# Patient Record
Sex: MALE | Race: WHITE | NOT HISPANIC OR LATINO | Employment: FULL TIME | URBAN - METROPOLITAN AREA
[De-identification: names, ages, dates, MRNs, and addresses within clinical notes are randomized per-mention and may not be internally consistent; named-entity substitution may affect disease eponyms.]

---

## 2017-01-03 ENCOUNTER — TELEPHONE (OUTPATIENT)
Dept: MEDICAL GROUP | Facility: MEDICAL CENTER | Age: 62
End: 2017-01-03

## 2017-01-03 NOTE — TELEPHONE ENCOUNTER
----- Message from Marvin Barreto M.D. sent at 1/3/2017  7:15 AM PST -----  INR is in therapeutic range. Continue current dose of Coumadin. Recheck INR in 1 month.  Marvin Barreto MD

## 2017-01-05 ENCOUNTER — PATIENT MESSAGE (OUTPATIENT)
Dept: MEDICAL GROUP | Facility: MEDICAL CENTER | Age: 62
End: 2017-01-05

## 2017-01-05 DIAGNOSIS — I10 ESSENTIAL HYPERTENSION: ICD-10-CM

## 2017-03-06 RX ORDER — SPIRONOLACTONE 50 MG/1
TABLET, FILM COATED ORAL
Qty: 180 TAB | Refills: 3 | Status: SHIPPED | OUTPATIENT
Start: 2017-03-06 | End: 2018-06-15 | Stop reason: SDUPTHER

## 2017-03-06 RX ORDER — PRAVASTATIN SODIUM 80 MG/1
TABLET ORAL
Qty: 90 TAB | Refills: 1 | Status: SHIPPED | OUTPATIENT
Start: 2017-03-06 | End: 2017-08-27 | Stop reason: SDUPTHER

## 2017-03-15 ENCOUNTER — TELEPHONE (OUTPATIENT)
Dept: MEDICAL GROUP | Facility: MEDICAL CENTER | Age: 62
End: 2017-03-15

## 2017-03-15 NOTE — TELEPHONE ENCOUNTER
----- Message from Marvin Barreto M.D. sent at 3/15/2017  1:46 PM PDT -----  Please notify patient that his INR is slightly elevated at 3.3.  Please have him continue his current dose and recheck INR in 2 weeks.  Marvin Barreto M.D.

## 2017-04-19 ENCOUNTER — HOSPITAL ENCOUNTER (OUTPATIENT)
Dept: LAB | Facility: MEDICAL CENTER | Age: 62
End: 2017-04-19
Attending: FAMILY MEDICINE
Payer: COMMERCIAL

## 2017-04-19 LAB — GFR SERPL CREATININE-BSD FRML MDRD: >60 ML/MIN/1.73 M 2

## 2017-04-19 PROCEDURE — 80061 LIPID PANEL: CPT

## 2017-04-19 PROCEDURE — 36415 COLL VENOUS BLD VENIPUNCTURE: CPT

## 2017-04-19 PROCEDURE — 83036 HEMOGLOBIN GLYCOSYLATED A1C: CPT

## 2017-04-19 PROCEDURE — 80053 COMPREHEN METABOLIC PANEL: CPT

## 2017-04-20 ENCOUNTER — TELEPHONE (OUTPATIENT)
Dept: MEDICAL GROUP | Facility: MEDICAL CENTER | Age: 62
End: 2017-04-20

## 2017-04-20 LAB
ALBUMIN SERPL BCP-MCNC: 4 G/DL (ref 3.2–4.9)
ALBUMIN/GLOB SERPL: 1 G/DL
ALP SERPL-CCNC: 60 U/L (ref 30–99)
ALT SERPL-CCNC: 72 U/L (ref 2–50)
ANION GAP SERPL CALC-SCNC: 8 MMOL/L (ref 0–11.9)
AST SERPL-CCNC: 86 U/L (ref 12–45)
BILIRUB SERPL-MCNC: 0.6 MG/DL (ref 0.1–1.5)
BUN SERPL-MCNC: 15 MG/DL (ref 8–22)
CALCIUM SERPL-MCNC: 9.8 MG/DL (ref 8.5–10.5)
CHLORIDE SERPL-SCNC: 102 MMOL/L (ref 96–112)
CHOLEST SERPL-MCNC: 220 MG/DL (ref 100–199)
CO2 SERPL-SCNC: 24 MMOL/L (ref 20–33)
CREAT SERPL-MCNC: 1.16 MG/DL (ref 0.5–1.4)
EST. AVERAGE GLUCOSE BLD GHB EST-MCNC: 157 MG/DL
GLOBULIN SER CALC-MCNC: 4.2 G/DL (ref 1.9–3.5)
GLUCOSE SERPL-MCNC: 129 MG/DL (ref 65–99)
HBA1C MFR BLD: 7.1 % (ref 0–5.6)
HDLC SERPL-MCNC: 52 MG/DL
LDLC SERPL CALC-MCNC: 128 MG/DL
POTASSIUM SERPL-SCNC: 4.6 MMOL/L (ref 3.6–5.5)
PROT SERPL-MCNC: 8.2 G/DL (ref 6–8.2)
SODIUM SERPL-SCNC: 134 MMOL/L (ref 135–145)
TRIGL SERPL-MCNC: 198 MG/DL (ref 0–149)

## 2017-04-20 NOTE — TELEPHONE ENCOUNTER
----- Message from Marvin Barreto M.D. sent at 4/20/2017  8:07 AM PDT -----  Please have patient schedule appointment to discuss recent lab results. Not urgent or critical.  Marvin Barreto MD

## 2017-04-21 RX ORDER — WARFARIN SODIUM 5 MG/1
TABLET ORAL
Qty: 180 TAB | Refills: 1 | Status: SHIPPED | OUTPATIENT
Start: 2017-04-21 | End: 2017-09-26 | Stop reason: SDUPTHER

## 2017-04-28 ENCOUNTER — TELEPHONE (OUTPATIENT)
Dept: MEDICAL GROUP | Facility: MEDICAL CENTER | Age: 62
End: 2017-04-28

## 2017-04-28 NOTE — TELEPHONE ENCOUNTER
----- Message from Marvin Barreto M.D. sent at 4/28/2017  1:45 PM PDT -----  INR is in therapeutic range. Continue current dose of Coumadin. Recheck INR in 1 month.  Marvin Barreto MD

## 2017-05-04 ENCOUNTER — OFFICE VISIT (OUTPATIENT)
Dept: URGENT CARE | Facility: CLINIC | Age: 62
End: 2017-05-04
Payer: COMMERCIAL

## 2017-05-04 VITALS
DIASTOLIC BLOOD PRESSURE: 85 MMHG | RESPIRATION RATE: 20 BRPM | WEIGHT: 288 LBS | BODY MASS INDEX: 46.51 KG/M2 | HEART RATE: 80 BPM | TEMPERATURE: 97.7 F | OXYGEN SATURATION: 95 % | SYSTOLIC BLOOD PRESSURE: 130 MMHG

## 2017-05-04 DIAGNOSIS — J01.00 ACUTE MAXILLARY SINUSITIS, RECURRENCE NOT SPECIFIED: ICD-10-CM

## 2017-05-04 DIAGNOSIS — H66.002 ACUTE SUPPURATIVE OTITIS MEDIA OF LEFT EAR WITHOUT SPONTANEOUS RUPTURE OF TYMPANIC MEMBRANE, RECURRENCE NOT SPECIFIED: ICD-10-CM

## 2017-05-04 DIAGNOSIS — Z96.22 PRESENCE OF TYMPANOSTOMY TUBE IN TYMPANIC MEMBRANE: ICD-10-CM

## 2017-05-04 PROCEDURE — 99214 OFFICE O/P EST MOD 30 MIN: CPT | Performed by: NURSE PRACTITIONER

## 2017-05-04 RX ORDER — AMOXICILLIN AND CLAVULANATE POTASSIUM 875; 125 MG/1; MG/1
1 TABLET, FILM COATED ORAL 2 TIMES DAILY
Qty: 14 TAB | Refills: 0 | Status: SHIPPED | OUTPATIENT
Start: 2017-05-04 | End: 2017-05-11

## 2017-05-04 ASSESSMENT — ENCOUNTER SYMPTOMS
COUGH: 1
HEADACHES: 0
DIAPHORESIS: 0
SHORTNESS OF BREATH: 0
PALPITATIONS: 0
SORE THROAT: 0
ORTHOPNEA: 0
WEAKNESS: 0
MYALGIAS: 0
FEVER: 0
HEMOPTYSIS: 0
SPUTUM PRODUCTION: 0
WHEEZING: 0
CHILLS: 0
DIZZINESS: 0

## 2017-05-04 NOTE — MR AVS SNAPSHOT
Jethro Wilmer   2017 5:30 PM   Office Visit   MRN: [id number]    Department:  [facility]   Dept Phone:  [phone]    Description:  Male : 1955   Provider:  MELLO Maurer           Reason for Visit     Cough Couple days dry cough , earache.      Allergies as of 2017     Allergen Noted Reactions    Nkda [No Known Drug Allergy] 2013         You were diagnosed with     Acute suppurative otitis media of left ear without spontaneous rupture of tympanic membrane, recurrence not specified   [9376389]       Acute maxillary sinusitis, recurrence not specified   [1590882]       Presence of tympanostomy tube in tympanic membrane   [6560197]         Vital Signs     Blood Pressure Pulse Temperature Respirations Weight Oxygen Saturation    130/85 mmHg 80 36.5 °C (97.7 °F) 20 130.636 kg (288 lb) 95%    Smoking Status                   Never Smoker            Basic Information     Date Of Birth Sex Race Ethnicity Preferred Language    1955 Male White Non- English      Your appointments     Aug 08, 2017 11:40 AM   Follow UP with JOSE MANUEL MongePMARIANA   [facility] (--)    [address] [id number] [phone]              Problem List              ICD-10-CM Priority Class Noted - Resolved    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, 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.0   Unknown - Present    OM (otitis media) H66.90   Unknown - Present    Atrial fibrillation (CMS-HCC) I48.91 High  2012 - Present    Anticoagulation goal of INR 2 to 3 Z51.81, Z79.01 High  2012 - Present    Morbid obesity with BMI of 45.0-49.9, adult (CMS-HCC) E66.01, Z68.42 High  2012 - Present    Diabetes mellitus type 2, uncontrolled (CMS-HCC) E11.65   2013 - Present    Hyperlipidemia E78.5   2013 - Present    ANAMIKA treated with BiPAP G47.33   2013 - Present    History of hepatitis Z86.19   2013 - Present    Vitamin D insufficiency E55.9    4/30/2013 - Present    Anticoagulated on Coumadin Z51.81, Z79.01   8/22/2013 - Present    Fatty liver K76.0   1/28/2014 - Present    HTN (hypertension) I10   6/20/2014 - Present    Cyst of skin L72.9   11/18/2015 - Present    Post-nasal drip R09.82   2/9/2016 - Present    Obesity E66.9   7/27/2016 - Present      Health Maintenance        Date Due Completion Dates    IMM DTaP/Tdap/Td Vaccine (1 - Tdap) 10/4/1972 ---    COLONOSCOPY 10/4/2003 ---    IMM ZOSTER VACCINE 10/4/2013 ---    RETINAL SCREENING 7/25/2014 7/25/2013 (Prv Comp)    Override on 7/25/2013: Previously completed (No diabetic retinopathy. [facility] )    DIABETES MONOFILAMENT / LE EXAM 5/6/2016 5/6/2015, 1/28/2014    A1C SCREENING 10/19/2017 4/19/2017, 10/19/2016, 6/13/2016, 2/2/2016, 11/13/2015, 4/16/2015, 12/23/2014, 9/10/2014, 6/18/2014, 1/28/2014, 7/30/2013, 5/31/2013, 4/30/2013, 6/19/2012, 7/22/2011    URINE ACR / MICROALBUMIN 10/19/2017 10/19/2016, 6/13/2016, 2/2/2016, 11/13/2015, 12/23/2014, 1/28/2014, 7/30/2013, 4/30/2013, 7/22/2011    FASTING LIPID PROFILE 4/19/2018 4/19/2017, 10/19/2016, 6/13/2016, 2/2/2016, 11/13/2015, 12/23/2014, 9/10/2014, 1/28/2014, 7/30/2013 (Done), 4/13/2013 (Done), 6/19/2012, 7/22/2011    Override on 7/30/2013: Done (lipoprotein - didn't auto satisfy)    Override on 4/13/2013: Done (NMR LIPID PANEL)    SERUM CREATININE 4/19/2018 4/19/2017, 10/19/2016, 6/13/2016, 2/2/2016, 11/13/2015, 4/16/2015, 12/23/2014, 9/10/2014, 6/18/2014, 1/28/2014, 7/30/2013, 5/31/2013, 4/30/2013, 6/19/2012, 7/22/2011            Current Immunizations     Influenza TIV (IM) 10/19/2013    Influenza Vaccine Quad Inj (Pf) 11/18/2015    Influenza Vaccine Quad Inj (Preserved) 10/21/2016    Pneumococcal polysaccharide vaccine (PPSV-23) 9/24/2014 11:28 AM      Below and/or attached are the medications your provider expects you to take. Review all of your home medications and newly ordered medications with your provider and/or pharmacist.  Follow medication instructions as directed by your provider and/or pharmacist. Please keep your medication list with you and share with your provider. Update the information when medications are discontinued, doses are changed, or new medications (including over-the-counter products) are added; and carry medication information at all times in the event of emergency situations     Allergies:  NKDA - (reactions not documented)               Medications  Valid as of: May 04, 2017 -  6:09 PM    Generic Name Brand Name Tablet Size Instructions for use    Amoxicillin-Pot Clavulanate (Tab) AUGMENTIN 875-125 MG Take 1 Tab by mouth 2 times a day for 7 days.        Azithromycin (Tab) ZITHROMAX 250 MG 2 PO day #1 then 1 PO day #2-5        Estradiol (Tab) ESTRACE 1 MG TAKE 2 & 1/2 TABLETS BY MOUTH EVERY DAY        Estradiol (Tab) ESTRACE 2 MG TAKE 2 & 1/2 TABLETS BY MOUTH EVERY DAY        Fluticasone Propionate (Suspension) FLONASE 50 MCG/ACT Spray 2 Sprays in nose every day.        GlipiZIDE (Tab) GLUCOTROL 5 MG Take 1 Tab by mouth 2 times a day.        Insulin Glargine (Solution Pen-injector) LANTUS SOLOSTAR 100 UNIT/ML INJECT 44 UNITS SUBCUTANEOUSLY EVERY EVENING AS DIRECTED BY PHYSICIAN        Lisinopril (Tab) PRINIVIL 5 MG TAKE ONE TABLET BY MOUTH ONE TIME DAILY        MetFORMIN HCl (Tab) GLUCOPHAGE 1000 MG TAKE 1 TAB BY MOUTH 2 TIMES A DAY, WITH MEALS.        Metoprolol Tartrate (Tab) LOPRESSOR 25 MG Take 1 Tab by mouth 2 times a day.        Omega-3 Fatty Acids (Cap) OMEGA 3 FA 1000 MG Take 1 Cap by mouth 2 times a day.        Pravastatin Sodium (Tab) PRAVACHOL 80 MG TAKE ONE TABLET BY MOUTH NIGHTLY AT BEDTIME        Promethazine-Codeine (Syrup) PHENERGAN-CODEINE 6.25-10 MG/5ML Take 5 mL by mouth 4 times a day as needed for Cough.        Spironolactone (Tab) ALDACTONE 50 MG TAKE ONE TABLET BY MOUTH TWICE DAILY        Warfarin Sodium (Tab) COUMADIN 1 MG TAKE THREE TABLETS BY MOUTH DAILY        Warfarin Sodium (Tab)  COUMADIN 5 MG TAKE TWO TABLETS BY MOUTH DAILY        .                 Medicines prescribed today were sent to:     [id number] [place] [facility], [place] - [address]    [address] [place] [address]    Phone: [phone] Fax: [phone]    Open 24 Hours?: No      Medication refill instructions:       If your prescription bottle indicates you have medication refills left, it is not necessary to call your provider’s office. Please contact your pharmacy and they will refill your medication.    If your prescription bottle indicates you do not have any refills left, you may request refills at any time through one of the following ways: The online [facility] system (except Urgent Care), by calling your provider’s office, or by asking your pharmacy to contact your provider’s office with a refill request. Medication refills are processed only during regular business hours and may not be available until the next business day. Your provider may request additional information or to have a follow-up visit with you prior to refilling your medication.   *Please Note: Medication refills are assigned a new Rx number when refilled electronically. Your pharmacy may indicate that no refills were authorized even though a new prescription for the same medication is available at the pharmacy. Please request the medicine by name with the pharmacy before contacting your provider for a refill.        Other Notes About Your Plan     DME:  [facility] ph [phone] / fax [phone]             [facility] Access Code: Activation code not generated  Current [facility] Status: Active

## 2017-05-05 NOTE — PROGRESS NOTES
Subjective:      Jethro Guillen is a 61 y.o. male who presents with Cough            Cough  Associated symptoms include ear pain. Pertinent negatives include no chest pain, chills, fever, headaches, hemoptysis, myalgias, rash, sore throat, shortness of breath or wheezing.   Patient comes in today with a 1 month history of sinus congestion and pressure with a new onset of bilateral otalgia and a dry cough for the past 3 days.  Nichelle reports a history of recurrent otitis media and has t-tubes in place.  Not taking any OTC medications to treat the symptoms.  INR therapeutic when checked last week.  AM fasting sugar at 130 this morning.      Review of Systems   Constitutional: Positive for malaise/fatigue. Negative for fever, chills and diaphoresis.   HENT: Positive for congestion and ear pain. Negative for ear discharge, hearing loss and sore throat.    Respiratory: Positive for cough. Negative for hemoptysis, sputum production, shortness of breath and wheezing.    Cardiovascular: Negative for chest pain, palpitations, orthopnea and leg swelling.   Musculoskeletal: Negative for myalgias.   Skin: Negative for rash.   Neurological: Negative for dizziness, weakness and headaches.     Medications, Allergies, and current problem list reviewed today in Epic     Objective:     /85 mmHg  Pulse 80  Temp(Src) 36.5 °C (97.7 °F)  Resp 20  Wt 130.636 kg (288 lb)  SpO2 95%     Physical Exam   Constitutional: He is oriented to person, place, and time. He appears well-developed and well-nourished. No distress.   HENT:   Head: Normocephalic.   Mouth/Throat: Oropharynx is clear and moist. No oropharyngeal exudate.   Nares patent.  Maxillary sinus pain on palpation.  Right TM is erythematous.  Tympanostomy tube in place.  No purulence or drainage.  Left TM is erythematous.  Tympanostomy tube in place with small amount of purulent drainage coming from the tube into the ear canal.    No pre- or post-auricular adenopathy.  NO mastoid  swelling or tenderness.   Eyes: Conjunctivae are normal. Pupils are equal, round, and reactive to light. Right eye exhibits no discharge. Left eye exhibits no discharge. No scleral icterus.   Neck: Neck supple. No JVD present. No tracheal deviation present. No thyromegaly present.   Cardiovascular: Normal rate, regular rhythm and normal heart sounds.  Exam reveals no gallop and no friction rub.    No murmur heard.  Pulmonary/Chest: Effort normal and breath sounds normal. No stridor. No respiratory distress. He has no wheezes. He has no rales. He exhibits no tenderness.   Minimal dry cough in clinic.   Musculoskeletal: He exhibits no edema.   Lymphadenopathy:     He has no cervical adenopathy.   Neurological: He is alert and oriented to person, place, and time.   Skin: Skin is warm and dry. He is not diaphoretic. No erythema. No pallor.   Vitals reviewed.              Assessment/Plan:     1. Acute suppurative otitis media of left ear without spontaneous rupture of tympanic membrane, recurrence not specified  amoxicillin-clavulanate (AUGMENTIN) 875-125 MG Tab   2. Acute maxillary sinusitis, recurrence not specified  amoxicillin-clavulanate (AUGMENTIN) 875-125 MG Tab   3. Presence of tympanostomy tube in tympanic membrane         - amoxicillin-clavulanate (AUGMENTIN) 875-125 MG Tab; Take 1 Tab by mouth 2 times a day for 7 days.  Dispense: 14 Tab; Refill: 0    Take full course of antibiotics.  OTC cold medications prn symptom management.  Maintain adequate po hydration.  RTC in 5-7 days if symptoms persist, sooner if worse.  Patient verbalized understanding of and agreed with plan of care.

## 2017-05-18 ENCOUNTER — OFFICE VISIT (OUTPATIENT)
Dept: MEDICAL GROUP | Facility: MEDICAL CENTER | Age: 62
End: 2017-05-18
Payer: COMMERCIAL

## 2017-05-18 VITALS
HEIGHT: 66 IN | HEART RATE: 89 BPM | DIASTOLIC BLOOD PRESSURE: 88 MMHG | WEIGHT: 260 LBS | SYSTOLIC BLOOD PRESSURE: 120 MMHG | OXYGEN SATURATION: 94 % | RESPIRATION RATE: 16 BRPM | BODY MASS INDEX: 41.78 KG/M2 | TEMPERATURE: 98.1 F

## 2017-05-18 DIAGNOSIS — K76.0 FATTY LIVER: ICD-10-CM

## 2017-05-18 DIAGNOSIS — Z12.11 COLON CANCER SCREENING: ICD-10-CM

## 2017-05-18 DIAGNOSIS — E78.00 PURE HYPERCHOLESTEROLEMIA: ICD-10-CM

## 2017-05-18 DIAGNOSIS — E55.9 VITAMIN D INSUFFICIENCY: ICD-10-CM

## 2017-05-18 DIAGNOSIS — I10 ESSENTIAL HYPERTENSION: ICD-10-CM

## 2017-05-18 PROCEDURE — 92250 FUNDUS PHOTOGRAPHY W/I&R: CPT | Mod: TC | Performed by: FAMILY MEDICINE

## 2017-05-18 PROCEDURE — 99214 OFFICE O/P EST MOD 30 MIN: CPT | Performed by: FAMILY MEDICINE

## 2017-05-18 RX ORDER — FLUTICASONE PROPIONATE 50 MCG
2 SPRAY, SUSPENSION (ML) NASAL DAILY
Qty: 16 G | Refills: 3 | Status: SHIPPED | OUTPATIENT
Start: 2017-05-18 | End: 2019-06-20 | Stop reason: SDUPTHER

## 2017-05-18 NOTE — ASSESSMENT & PLAN NOTE
Patient has been eating fatty food recently because he has had to eat out on his trip to [facility]. Denies any alcohol intake. Patient has known fatty liver disease.

## 2017-05-18 NOTE — MR AVS SNAPSHOT
"        Jethro Guillen   2017 11:00 AM   Office Visit   MRN: [id number]    Department:  [facility]   Dept Phone:  [phone]    Description:  Male : 1955   Provider:  Marvin Barreto M.D.           Reason for Visit     Follow-Up     Results           Allergies as of 2017     Allergen Noted Reactions    Nkda [No Known Drug Allergy] 2013         You were diagnosed with     Uncontrolled type 2 diabetes mellitus with complication, with long-term current use of insulin (CMS-HCC)   [7599306]       Pure hypercholesterolemia   [272.0.ICD-9-CM]       Essential hypertension   [4927152]       Vitamin D insufficiency   [630113]       Colon cancer screening   [889497]       Fatty liver   [740265]         Vital Signs     Blood Pressure Pulse Temperature Respirations Height Weight    120/88 mmHg 89 36.7 °C (98.1 °F) 16 1.676 m (5' 5.98\") 117.935 kg (260 lb)    Body Mass Index Oxygen Saturation Smoking Status             41.99 kg/m2 94% Never Smoker          Basic Information     Date Of Birth Sex Race Ethnicity Preferred Language    1955 Male White Non- English      Your appointments     Aug 08, 2017 11:40 AM   Follow UP with CITLALI Monge   [facility] (--)    [address] [place] [address]  [place] [id number] [phone]              Problem List              ICD-10-CM Priority Class Noted - Resolved    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, 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.0   Unknown - Present    OM (otitis media) H66.90   Unknown - Present    Atrial fibrillation (CMS-HCC) I48.91 High  2012 - Present    Anticoagulation goal of INR 2 to 3 Z51.81, Z79.01 High  2012 - Present    Morbid obesity with BMI of 45.0-49.9, adult (CMS-HCC) E66.01, Z68.42 High  2012 - Present    Diabetes mellitus type 2, uncontrolled (CMS-HCC) E11.65   2013 - Present    Hyperlipidemia E78.5   2013 - Present    ANAMIKA treated with BiPAP G47.33   2013 - Present   " History of hepatitis Z86.19   4/30/2013 - Present    Vitamin D insufficiency E55.9   4/30/2013 - Present    Anticoagulated on Coumadin Z51.81, Z79.01   8/22/2013 - Present    Fatty liver K76.0   1/28/2014 - Present    HTN (hypertension) I10   6/20/2014 - Present    Cyst of skin L72.9   11/18/2015 - Present    Post-nasal drip R09.82   2/9/2016 - Present    Obesity E66.9   7/27/2016 - Present      Health Maintenance        Date Due Completion Dates    IMM DTaP/Tdap/Td Vaccine (1 - Tdap) 10/4/1972 ---    COLONOSCOPY 10/4/2003 ---    IMM ZOSTER VACCINE 10/4/2013 ---    RETINAL SCREENING 7/25/2014 7/25/2013 (Prv Comp)    Override on 7/25/2013: Previously completed (No diabetic retinopathy. [facility] )    DIABETES MONOFILAMENT / LE EXAM 5/6/2016 5/6/2015, 1/28/2014    A1C SCREENING 10/19/2017 4/19/2017, 10/19/2016, 6/13/2016, 2/2/2016, 11/13/2015, 4/16/2015, 12/23/2014, 9/10/2014, 6/18/2014, 1/28/2014, 7/30/2013, 5/31/2013, 4/30/2013, 6/19/2012, 7/22/2011    URINE ACR / MICROALBUMIN 10/19/2017 10/19/2016, 6/13/2016, 2/2/2016, 11/13/2015, 12/23/2014, 1/28/2014, 7/30/2013, 4/30/2013, 7/22/2011    FASTING LIPID PROFILE 4/19/2018 4/19/2017, 10/19/2016, 6/13/2016, 2/2/2016, 11/13/2015, 12/23/2014, 9/10/2014, 1/28/2014, 7/30/2013 (Done), 4/13/2013 (Done), 6/19/2012, 7/22/2011    Override on 7/30/2013: Done (lipoprotein - didn't auto satisfy)    Override on 4/13/2013: Done (NMR LIPID PANEL)    SERUM CREATININE 4/19/2018 4/19/2017, 10/19/2016, 6/13/2016, 2/2/2016, 11/13/2015, 4/16/2015, 12/23/2014, 9/10/2014, 6/18/2014, 1/28/2014, 7/30/2013, 5/31/2013, 4/30/2013, 6/19/2012, 7/22/2011            Current Immunizations     Influenza TIV (IM) 10/19/2013    Influenza Vaccine Quad Inj (Pf) 11/18/2015    Influenza Vaccine Quad Inj (Preserved) 10/21/2016    Pneumococcal polysaccharide vaccine (PPSV-23) 9/24/2014 11:28 AM      Below and/or attached are the medications your provider expects you to take. Review all of your home  medications and newly ordered medications with your provider and/or pharmacist. Follow medication instructions as directed by your provider and/or pharmacist. Please keep your medication list with you and share with your provider. Update the information when medications are discontinued, doses are changed, or new medications (including over-the-counter products) are added; and carry medication information at all times in the event of emergency situations     Allergies:  NKDA - (reactions not documented)               Medications  Valid as of: May 18, 2017 - 11:30 AM    Generic Name Brand Name Tablet Size Instructions for use    Estradiol (Tab) ESTRACE 1 MG TAKE 2 & 1/2 TABLETS BY MOUTH EVERY DAY        Estradiol (Tab) ESTRACE 2 MG TAKE 2 & 1/2 TABLETS BY MOUTH EVERY DAY        Fluticasone Propionate (Suspension) FLONASE 50 MCG/ACT Spray 2 Sprays in nose every day.        GlipiZIDE (Tab) GLUCOTROL 5 MG Take 1 Tab by mouth 2 times a day.        Insulin Glargine (Solution Pen-injector) LANTUS SOLOSTAR 100 UNIT/ML INJECT 44 UNITS SUBCUTANEOUSLY EVERY EVENING AS DIRECTED BY PHYSICIAN        Lisinopril (Tab) PRINIVIL 5 MG TAKE ONE TABLET BY MOUTH ONE TIME DAILY        MetFORMIN HCl (Tab) GLUCOPHAGE 1000 MG TAKE 1 TAB BY MOUTH 2 TIMES A DAY, WITH MEALS.        Metoprolol Tartrate (Tab) LOPRESSOR 25 MG Take 1 Tab by mouth 2 times a day.        Omega-3 Fatty Acids (Cap) OMEGA 3 FA 1000 MG Take 1 Cap by mouth 2 times a day.        Pravastatin Sodium (Tab) PRAVACHOL 80 MG TAKE ONE TABLET BY MOUTH NIGHTLY AT BEDTIME        Spironolactone (Tab) ALDACTONE 50 MG TAKE ONE TABLET BY MOUTH TWICE DAILY        Warfarin Sodium (Tab) COUMADIN 1 MG TAKE THREE TABLETS BY MOUTH DAILY        Warfarin Sodium (Tab) COUMADIN 5 MG TAKE TWO TABLETS BY MOUTH DAILY        .                 Medicines prescribed today were sent to:     [id number] [place] [facility] [place] - [address]    [address] [place] [address]    Phone:  [phone] Fax: [phone]    Open 24 Hours?: No      Medication refill instructions:       If your prescription bottle indicates you have medication refills left, it is not necessary to call your provider’s office. Please contact your pharmacy and they will refill your medication.    If your prescription bottle indicates you do not have any refills left, you may request refills at any time through one of the following ways: The online [facility] system (except Urgent Care), by calling your provider’s office, or by asking your pharmacy to contact your provider’s office with a refill request. Medication refills are processed only during regular business hours and may not be available until the next business day. Your provider may request additional information or to have a follow-up visit with you prior to refilling your medication.   *Please Note: Medication refills are assigned a new Rx number when refilled electronically. Your pharmacy may indicate that no refills were authorized even though a new prescription for the same medication is available at the pharmacy. Please request the medicine by name with the pharmacy before contacting your provider for a refill.        Your To Do List     Future Labs/Procedures Complete By Expires    COMP METABOLIC PANEL  As directed 5/19/2018    HEMOGLOBIN A1C  As directed 5/19/2018    LIPID PROFILE  As directed 5/19/2018    MICROALBUMIN CREAT RATIO URINE  As directed 5/19/2018    TSH WITH REFLEX TO FT4  As directed 5/18/2018    VITAMIN D,25 HYDROXY  As directed 5/19/2018      Referral     A referral request has been sent to our patient care coordination department. Please allow 3-5 business days for us to process this request and contact you either by phone or mail. If you do not hear from us by the 5th business day, please call us at [phone].        Other Notes About Your Plan     DME:  Russ  / darshana [phone] / fax [phone]             [facility] Access Code:  Activation code not generated  Current MyChart Status: Active

## 2017-05-18 NOTE — ASSESSMENT & PLAN NOTE
Patient is being more compliant with diabetic medication. He is taking Lantus 44 units daily, glipizide 5 mg twice a day, metformin 1000 g twice a day. As a result his A1c has improved from 9.5 down to 7.1.

## 2017-05-18 NOTE — PROGRESS NOTES
Subjective:   Caren Guillen is a 61 y.o. male here today for diabetes, hyperlipidemia  Transgender male to female    Diabetes mellitus type 2, uncontrolled  Patient is being more compliant with diabetic medication. He is taking Lantus 44 units daily, glipizide 5 mg twice a day, metformin 1000 g twice a day. As a result his A1c has improved from 9.5 down to 7.1.    Hyperlipidemia  Patient is on pravastatin 80 mg daily.  She does admit to eating out frequently because of a trip to [place] recently.    Results for CAREN GUILLEN (MRN [id number]) as of 5/18/2017 11:20   Ref. Range 10/19/2016 12:00 4/19/2017 13:51   Cholesterol,Tot Latest Ref Range: 100-199 mg/dL 174 220 (H)   Triglycerides Latest Ref Range: 0-149 mg/dL 348 (H) 198 (H)   HDL Latest Ref Range: >=40 mg/dL 43 52   LDL Latest Ref Range: <100 mg/dL 61 128 (H)       Fatty liver  Patient has been eating fatty food recently because he has had to eat out on his trip to [facility]. Denies any alcohol intake. Patient has known fatty liver disease.    HTN (hypertension)  Patient is tolerating lisinopril, metoprolol, Aldactone.           Current medicines (including changes today)  Current Outpatient Prescriptions   Medication Sig Dispense Refill   • fluticasone (FLONASE) 50 MCG/ACT nasal spray Spray 2 Sprays in nose every day. 16 g 3   • warfarin (COUMADIN) 5 MG Tab TAKE TWO TABLETS BY MOUTH DAILY 180 Tab 1   • spironolactone (ALDACTONE) 50 MG Tab TAKE ONE TABLET BY MOUTH TWICE DAILY 180 Tab 3   • metformin (GLUCOPHAGE) 1000 MG tablet TAKE 1 TAB BY MOUTH 2 TIMES A DAY, WITH MEALS. 180 Tab 1   • pravastatin (PRAVACHOL) 80 MG tablet TAKE ONE TABLET BY MOUTH NIGHTLY AT BEDTIME 90 Tab 1   • metoprolol (LOPRESSOR) 25 MG Tab Take 1 Tab by mouth 2 times a day. 180 Tab 3   • warfarin (COUMADIN) 1 MG Tab TAKE THREE TABLETS BY MOUTH DAILY 270 Tab 3   • glipiZIDE (GLUCOTROL) 5 MG Tab Take 1 Tab by mouth 2 times a day. 180 Tab 3   • lisinopril (PRINIVIL) 5 MG Tab TAKE ONE TABLET BY  "MOUTH ONE TIME DAILY 90 Tab 3   • LANTUS SOLOSTAR 100 UNIT/ML Solution Pen-injector injection INJECT 44 UNITS SUBCUTANEOUSLY EVERY EVENING AS DIRECTED BY PHYSICIAN 15 PEN 2   • estradiol (ESTRACE) 2 MG Tab TAKE 2 & 1/2 TABLETS BY MOUTH EVERY  Tab 3   • estradiol (ESTRACE) 1 MG Tab TAKE 2 & 1/2 TABLETS BY MOUTH EVERY DAY 75 Tab 11   • docosahexanoic acid (FISH OIL) 1000 MG CAPS Take 1 Cap by mouth 2 times a day. 60 Cap 6     No current facility-administered medications for this visit.     He  has a past medical history of 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; OM (otitis media); Reflux; Atrial fibrillation (CMS-HCC) (1/23/2012); Chronic anticoagulation (1/23/2012); Obesity (1/23/2012); Obstructive sleep apnea (1/23/2012); and Diabetes (CMS-HCC). He also has no past medical history of COPD (chronic obstructive pulmonary disease) (CMS-HCC) or Asthma.    ROS   No chest pain, no shortness of breath       Objective:     Blood pressure 120/88, pulse 89, temperature 36.7 °C (98.1 °F), resp. rate 16, height 1.676 m (5' 5.98\"), weight 117.935 kg (260 lb), SpO2 94 %. Body mass index is 41.99 kg/(m^2).   Physical Exam:  Constitutional: Alert, no distress.  Skin: Warm, dry, good turgor, no rashes in visible areas.  Eye: Equal, round and reactive, conjunctiva clear, lids normal.  Psych: Alert and oriented x3, normal affect and mood.        Assessment and Plan:   The following treatment plan was discussed    1. Uncontrolled type 2 diabetes mellitus with complication, with long-term current use of insulin (CMS-HCC)  Controlled. Continue current medication. Encouraged patient to continue being compliant with medication. Follow up with labs in 5 months.  - POCT Retinal Eye Exam  - COMP METABOLIC PANEL; Future  - HEMOGLOBIN A1C; Future  - MICROALBUMIN CREAT RATIO URINE; Future  - LIPID PROFILE; Future    2. Pure hypercholesterolemia  Improved triglycerides but increased LDL. Encouraged patient to improve diet. Continue " current medication. Follow-up with labs in 5 months.  - TSH WITH REFLEX TO FT4; Future    3. Essential hypertension  Controlled. Continue current medication.    4. Vitamin D insufficiency  Check vitamin D in 5 months.  - VITAMIN D,25 HYDROXY; Future    5. Colon cancer screening  Patient has not done FIT's. Referral to GI.  - REFERRAL TO GASTROENTEROLOGY    6. Fatty liver  Advised improved diet and weight loss.      Followup: Return in about 5 months (around 10/18/2017) for Diabetes, Short.

## 2017-05-18 NOTE — ASSESSMENT & PLAN NOTE
Patient is on pravastatin 80 mg daily.  She does admit to eating out frequently because of a trip to [place] recently.    Results for CAREN SERVIN (MRN [id number]) as of 5/18/2017 11:20   Ref. Range 10/19/2016 12:00 4/19/2017 13:51   Cholesterol,Tot Latest Ref Range: 100-199 mg/dL 174 220 (H)   Triglycerides Latest Ref Range: 0-149 mg/dL 348 (H) 198 (H)   HDL Latest Ref Range: >=40 mg/dL 43 52   LDL Latest Ref Range: <100 mg/dL 61 128 (H)

## 2017-05-19 ENCOUNTER — TELEPHONE (OUTPATIENT)
Dept: MEDICAL GROUP | Facility: MEDICAL CENTER | Age: 62
End: 2017-05-19

## 2017-05-19 NOTE — TELEPHONE ENCOUNTER
----- Message from Marvin Barreto M.D. sent at 5/19/2017 11:12 AM PDT -----  Please notify patient that retinal eye exam was normal. We can repeat eye exam next year.  Marvin Barreto M.D.

## 2017-06-23 ENCOUNTER — TELEPHONE (OUTPATIENT)
Dept: MEDICAL GROUP | Facility: MEDICAL CENTER | Age: 62
End: 2017-06-23

## 2017-06-23 NOTE — TELEPHONE ENCOUNTER
----- Message from Marvin Barreto M.D. sent at 6/23/2017  2:25 PM PDT -----  Please notify patient that she is in the low normal range for INR at 1.9. Continue current dose and please have her recheck INR next week.  Marvin Barreto M.D.

## 2017-07-05 ENCOUNTER — TELEPHONE (OUTPATIENT)
Dept: MEDICAL GROUP | Facility: MEDICAL CENTER | Age: 62
End: 2017-07-05

## 2017-07-05 NOTE — TELEPHONE ENCOUNTER
----- Message from Marvin Barreto M.D. sent at 7/5/2017  7:22 AM PDT -----  INR is in therapeutic range. Continue current dose of Coumadin. Recheck INR in 1 month.  Marvin Barreto MD

## 2017-07-25 RX ORDER — ESTRADIOL 1 MG/1
TABLET ORAL
Qty: 75 TAB | Refills: 11 | Status: SHIPPED | OUTPATIENT
Start: 2017-07-25 | End: 2018-01-17 | Stop reason: SDUPTHER

## 2017-07-25 NOTE — TELEPHONE ENCOUNTER
Was the patient seen in the last year in this department? Yes     Does patient have an active prescription for medications requested? No     Received Request Via: Pharmacy     Last seen: 05/18/2017 Slots

## 2017-08-15 RX ORDER — INSULIN GLARGINE 100 [IU]/ML
INJECTION, SOLUTION SUBCUTANEOUS
Qty: 45 PEN | Refills: 2 | Status: SHIPPED | OUTPATIENT
Start: 2017-08-15 | End: 2017-12-27

## 2017-08-24 ENCOUNTER — SLEEP CENTER VISIT (OUTPATIENT)
Dept: SLEEP MEDICINE | Facility: MEDICAL CENTER | Age: 62
End: 2017-08-24
Payer: COMMERCIAL

## 2017-08-24 VITALS
TEMPERATURE: 97.9 F | RESPIRATION RATE: 18 BRPM | WEIGHT: 287 LBS | SYSTOLIC BLOOD PRESSURE: 122 MMHG | DIASTOLIC BLOOD PRESSURE: 90 MMHG | BODY MASS INDEX: 46.12 KG/M2 | HEIGHT: 66 IN | OXYGEN SATURATION: 94 % | HEART RATE: 81 BPM

## 2017-08-24 DIAGNOSIS — G47.33 OSA (OBSTRUCTIVE SLEEP APNEA): ICD-10-CM

## 2017-08-24 DIAGNOSIS — E66.01 MORBID OBESITY WITH BMI OF 45.0-49.9, ADULT (HCC): ICD-10-CM

## 2017-08-24 PROCEDURE — 99213 OFFICE O/P EST LOW 20 MIN: CPT | Performed by: NURSE PRACTITIONER

## 2017-08-24 RX ORDER — NEOMYCIN SULFATE, POLYMYXIN B SULFATE AND HYDROCORTISONE 10; 3.5; 1 MG/ML; MG/ML; [USP'U]/ML
8 SUSPENSION/ DROPS AURICULAR (OTIC)
COMMUNITY
Start: 2011-02-14

## 2017-08-24 RX ORDER — OMEPRAZOLE 20 MG/1
CAPSULE, DELAYED RELEASE ORAL
COMMUNITY
End: 2021-04-06

## 2017-08-24 RX ORDER — ASPIRIN 325 MG
TABLET ORAL
COMMUNITY
End: 2021-04-27

## 2017-08-24 RX ORDER — OFLOXACIN 3 MG/ML
5 SOLUTION AURICULAR (OTIC)
COMMUNITY
Start: 2010-12-28

## 2017-08-24 RX ORDER — FENOFIBRATE 54 MG/1
TABLET ORAL
COMMUNITY
End: 2020-11-20 | Stop reason: SDUPTHER

## 2017-08-24 NOTE — PROGRESS NOTES
CC:  Here for f/u sleep issues as listed below    HPI:   Jethro, who likes to be called Nichelle presents today for follow up obstructive sleep apnea. She is a transgender male, now female.  PSG from 2008 at an outside source. indicated an AHI of 99.7 and low oxygen of 77%.  Currently he is being treated with BIPAP @ 20/38taC70.  Compliance download is not available today. He admits he uses it nightly.  He does tolerate pressure and mask well.  He wakes up refreshed and is less tired throughout the day. They deny morning H/A. He sleeps better overall. He will continue to clean supplies weekly and change them as insurance allows.        Patient Active Problem List    Diagnosis Date Noted   • Atrial fibrillation (CMS-HCC) 01/23/2012     Priority: High   • Anticoagulation goal of INR 2 to 3 01/23/2012     Priority: High   • Morbid obesity with BMI of 45.0-49.9, adult (CMS-HCC) 01/23/2012     Priority: High   • ANAMIKA (obstructive sleep apnea) 08/24/2017   • Obesity 07/27/2016   • Post-nasal drip 02/09/2016   • Cyst of skin 11/18/2015   • HTN (hypertension) 06/20/2014   • Fatty liver 01/28/2014   • Anticoagulated on Coumadin 08/22/2013   • Diabetes mellitus type 2, uncontrolled (CMS-HCC) 04/30/2013   • Hyperlipidemia 04/30/2013   • ANAMIKA treated with BiPAP 04/30/2013   • History of hepatitis 04/30/2013   • Vitamin D insufficiency 04/30/2013   • 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    • OM (otitis media)        Past Medical History   Diagnosis Date   • 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    • OM (otitis media)      recurrent otitis as well as mild hearing loss   • Reflux      s/p dilitaion   • Atrial fibrillation (CMS-HCC) 1/23/2012   • Chronic anticoagulation 1/23/2012   • Obesity 1/23/2012   • Obstructive sleep apnea 1/23/2012   • Diabetes (CMS-HCC)        Past Surgical History   Procedure Laterality Date   • Ear middle exploration     • Pr esophagoscopy,dilation,<30mm         Family History   Problem Relation Age of  Onset   • Hyperlipidemia Mother    • Lung Disease Brother    • Heart Disease Brother        Social History     Social History   • Marital Status:      Spouse Name: N/A   • Number of Children: N/A   • Years of Education: N/A     Occupational History   • Not on file.     Social History Main Topics   • Smoking status: Never Smoker    • Smokeless tobacco: Never Used   • Alcohol Use: No   • Drug Use: No   • Sexual Activity:     Partners: Male     Other Topics Concern   • Not on file     Social History Narrative       Current Outpatient Prescriptions   Medication Sig Dispense Refill   • neomycin-polymyxin-HC (PEDIOTIC HC) 3.5-85298-4 Suspension Place 8 Drops in right ear.     • ofloxacin otic sol (FLOXIN OTIC) 0.3 % Solution Place 5 Drops in right ear.     • LANTUS SOLOSTAR 100 UNIT/ML Solution Pen-injector injection INJECT 44 UNITS SUBCUTANEOUSLY EVERY EVENING AS DIRECTED BY PHYSICIAN 45 PEN 2   • estradiol (ESTRACE) 1 MG Tab TAKE 2 & 1/2 TABLETS BY MOUTH EVERY DAY 75 Tab 11   • fluticasone (FLONASE) 50 MCG/ACT nasal spray Spray 2 Sprays in nose every day. 16 g 3   • warfarin (COUMADIN) 5 MG Tab TAKE TWO TABLETS BY MOUTH DAILY 180 Tab 1   • spironolactone (ALDACTONE) 50 MG Tab TAKE ONE TABLET BY MOUTH TWICE DAILY 180 Tab 3   • metformin (GLUCOPHAGE) 1000 MG tablet TAKE 1 TAB BY MOUTH 2 TIMES A DAY, WITH MEALS. 180 Tab 1   • pravastatin (PRAVACHOL) 80 MG tablet TAKE ONE TABLET BY MOUTH NIGHTLY AT BEDTIME 90 Tab 1   • metoprolol (LOPRESSOR) 25 MG Tab Take 1 Tab by mouth 2 times a day. 180 Tab 3   • warfarin (COUMADIN) 1 MG Tab TAKE THREE TABLETS BY MOUTH DAILY 270 Tab 3   • glipiZIDE (GLUCOTROL) 5 MG Tab Take 1 Tab by mouth 2 times a day. 180 Tab 3   • lisinopril (PRINIVIL) 5 MG Tab TAKE ONE TABLET BY MOUTH ONE TIME DAILY 90 Tab 3   • estradiol (ESTRACE) 2 MG Tab TAKE 2 & 1/2 TABLETS BY MOUTH EVERY  Tab 3   • docosahexanoic acid (FISH OIL) 1000 MG CAPS Take 1 Cap by mouth 2 times a day. 60 Cap 6   • aspirin  "(ASA) 325 MG Tab Take  by mouth.     • fenofibrate (TRICOR) 54 MG tablet Take  by mouth.     • omeprazole (PRILOSEC) 20 MG delayed-release capsule Take  by mouth.       No current facility-administered medications for this visit.          Allergies: Nkda      ROS   Gen: Denies fever, chills, unintentional weight loss, fatigue  Resp:Denies Dyspnea  CV: Denies chest pain, chest tightness  Sleep:Denies morning headache, insomnia, daytime somnolence, snoring, gasping for air, apnea  Neuro: Denies frequent headaches, weakness, dizziness  See HPI.  All other systems reviewed and negative        Vital signs for this encounter:  Filed Vitals:    08/24/17 1311   Height: 1.676 m (5' 5.98\")   Weight: 130.182 kg (287 lb)   Weight % change since last entry.: 0 %   BP: 122/90   Pulse: 81   BMI (Calculated): 46.35   Resp: 18   Temp: 36.6 °C (97.9 °F)   O2 sat % room air: 94 %                   Physical Exam:   Gen:         Alert and oriented, No apparent distress.   Neck:        No Lymphadenopathy.  Lungs:     Clear to auscultation bilaterally.    CV:          Regular rate and rhythm. No murmurs, rubs or gallops.   Abd:         Soft non tender, non distended.            Ext:          No clubbing, cyanosis, edema.    Assessment   1. ANAMIKA (obstructive sleep apnea)  DME MASK AND SUPPLIES   2. Morbid obesity with BMI of 45.0-49.9, adult (CMS-[facility])         PLAN:   Patient Instructions   1) Continue BIPAP at 20/71dnE07  2) Clean mask and supplies weekly and change them as insurance allows - add heated tubing  3) Vaccines: Up to date with Pneumovax 23  4) Return in about 1 year (around 8/24/2018) for Compliance, review of symptoms, if not sooner, follow up with RAMÓN Noyola.          "

## 2017-08-24 NOTE — MR AVS SNAPSHOT
"        Jethro Guillen   2017 1:20 PM   Sleep Center Visit   MRN: [id number]    Department:  [facility]   Dept Phone:  [phone]    Description:  Male : 1955   Provider:  MELLO Segovia           Reason for Visit     Apnea Last seen 17       Allergies as of 2017     Allergen Noted Reactions    Nkda [No Known Drug Allergy] 2013         You were diagnosed with     ANAMIKA (obstructive sleep apnea)   [660552]       Morbid obesity with BMI of 45.0-49.9, adult (CMS-HCC)   [079919]         Vital Signs     Blood Pressure Pulse Temperature Respirations Height Weight    122/90 mmHg 81 36.6 °C (97.9 °F) 18 1.676 m (5' 5.98\") 130.182 kg (287 lb)    Body Mass Index Oxygen Saturation Smoking Status             46.35 kg/m2 94% Never Smoker          Basic Information     Date Of Birth Sex Race Ethnicity Preferred Language    1955 Male White Non- English      Problem List              ICD-10-CM Priority Class Noted - Resolved    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, F64.0   Unknown - Present    OM (otitis media) H66.90   Unknown - Present    Atrial fibrillation (CMS-HCC) I48.91 High  2012 - Present    Anticoagulation goal of INR 2 to 3 Z51.81, Z79.01 High  2012 - Present    Morbid obesity with BMI of 45.0-49.9, adult (CMS-HCC) E66.01, Z68.42 High  2012 - Present    Diabetes mellitus type 2, uncontrolled (CMS-HCC) E11.65   2013 - Present    Hyperlipidemia E78.5   2013 - Present    ANAMIKA treated with BiPAP G47.33   2013 - Present    History of hepatitis Z86.19   2013 - Present    Vitamin D insufficiency E55.9   2013 - Present    Anticoagulated on Coumadin Z51.81, Z79.01   2013 - Present    Fatty liver K76.0   2014 - Present    HTN (hypertension) I10   2014 - Present    Cyst of skin L72.9   2015 - Present    Post-nasal drip R09.82   2016 - Present    Obesity E66.9   2016 - Present    ANAMIKA (obstructive " sleep apnea) G47.33   8/24/2017 - Present      Health Maintenance        Date Due Completion Dates    IMM DTaP/Tdap/Td Vaccine (1 - Tdap) 10/4/1972 ---    COLONOSCOPY 10/4/2003 ---    IMM ZOSTER VACCINE 10/4/2013 ---    DIABETES MONOFILAMENT / LE EXAM 5/6/2016 5/6/2015, 1/28/2014    IMM INFLUENZA (1) 9/1/2017 10/21/2016, 11/18/2015, 10/19/2013    A1C SCREENING 10/19/2017 4/19/2017, 10/19/2016, 6/13/2016, 2/2/2016, 11/13/2015, 4/16/2015, 12/23/2014, 9/10/2014, 6/18/2014, 1/28/2014, 7/30/2013, 5/31/2013, 4/30/2013, 6/19/2012, 7/22/2011    URINE ACR / MICROALBUMIN 10/19/2017 10/19/2016, 6/13/2016, 2/2/2016, 11/13/2015, 12/23/2014, 1/28/2014, 7/30/2013, 4/30/2013, 7/22/2011    FASTING LIPID PROFILE 4/19/2018 4/19/2017, 10/19/2016, 6/13/2016, 2/2/2016, 11/13/2015, 12/23/2014, 9/10/2014, 1/28/2014, 7/30/2013 (Done), 7/30/2013, 4/30/2013, 4/13/2013 (Done), 6/19/2012, 7/22/2011    Override on 7/30/2013: Done (lipoprotein - didn't auto satisfy)    Override on 4/13/2013: Done (NMR LIPID PANEL)    SERUM CREATININE 4/19/2018 4/19/2017, 10/19/2016, 6/13/2016, 2/2/2016, 11/13/2015, 4/16/2015, 12/23/2014, 9/10/2014, 6/18/2014, 1/28/2014, 7/30/2013, 5/31/2013, 4/30/2013, 6/19/2012, 7/22/2011    RETINAL SCREENING 5/18/2018 5/18/2017, 7/25/2013 (Prv Comp)    Override on 7/25/2013: Previously completed (No diabetic retinopathy. [facility] )            Current Immunizations     Influenza TIV (IM) 10/19/2013    Influenza Vaccine Quad Inj (Pf) 11/18/2015    Influenza Vaccine Quad Inj (Preserved) 10/21/2016    Pneumococcal polysaccharide vaccine (PPSV-23) 9/24/2014 11:28 AM      Below and/or attached are the medications your provider expects you to take. Review all of your home medications and newly ordered medications with your provider and/or pharmacist. Follow medication instructions as directed by your provider and/or pharmacist. Please keep your medication list with you and share with your provider. Update the information  when medications are discontinued, doses are changed, or new medications (including over-the-counter products) are added; and carry medication information at all times in the event of emergency situations     Allergies:  NKDA - (reactions not documented)               Medications  Valid as of: August 24, 2017 -  1:40 PM    Generic Name Brand Name Tablet Size Instructions for use    Aspirin (Tab)  MG Take  by mouth.        Estradiol (Tab) ESTRACE 2 MG TAKE 2 & 1/2 TABLETS BY MOUTH EVERY DAY        Estradiol (Tab) ESTRACE 1 MG TAKE 2 & 1/2 TABLETS BY MOUTH EVERY DAY        Fenofibrate (Tab) TRICOR 54 MG Take  by mouth.        Fluticasone Propionate (Suspension) FLONASE 50 MCG/ACT Spray 2 Sprays in nose every day.        GlipiZIDE (Tab) GLUCOTROL 5 MG Take 1 Tab by mouth 2 times a day.        Insulin Glargine (Solution Pen-injector) LANTUS SOLOSTAR 100 UNIT/ML INJECT 44 UNITS SUBCUTANEOUSLY EVERY EVENING AS DIRECTED BY PHYSICIAN        Lisinopril (Tab) PRINIVIL 5 MG TAKE ONE TABLET BY MOUTH ONE TIME DAILY        MetFORMIN HCl (Tab) GLUCOPHAGE 1000 MG TAKE 1 TAB BY MOUTH 2 TIMES A DAY, WITH MEALS.        Metoprolol Tartrate (Tab) LOPRESSOR 25 MG Take 1 Tab by mouth 2 times a day.        Neomycin-Polymyxin-HC (Suspension) PEDIOTIC HC 3.5-68629-3 Place 8 Drops in right ear.        Ofloxacin (Solution) FLOXIN OTIC 0.3 % Place 5 Drops in right ear.        Omega-3 Fatty Acids (Cap) OMEGA 3 FA 1000 MG Take 1 Cap by mouth 2 times a day.        Omeprazole (CAPSULE DELAYED RELEASE) PRILOSEC 20 MG Take  by mouth.        Pravastatin Sodium (Tab) PRAVACHOL 80 MG TAKE ONE TABLET BY MOUTH NIGHTLY AT BEDTIME        Spironolactone (Tab) ALDACTONE 50 MG TAKE ONE TABLET BY MOUTH TWICE DAILY        Warfarin Sodium (Tab) COUMADIN 1 MG TAKE THREE TABLETS BY MOUTH DAILY        Warfarin Sodium (Tab) COUMADIN 5 MG TAKE TWO TABLETS BY MOUTH DAILY        .                 Medicines prescribed today were sent to:     [facility] [id number] [facility] [employer] -  [place], [place] - [address]    [address] [place] [address]    Phone: [phone] Fax: [phone]    Open 24 Hours?: No    [facility]    [address] [place] [address]    Phone: [phone] Fax: [phone]      Medication refill instructions:       If your prescription bottle indicates you have medication refills left, it is not necessary to call your provider’s office. Please contact your pharmacy and they will refill your medication.    If your prescription bottle indicates you do not have any refills left, you may request refills at any time through one of the following ways: The online "[employer] system (except Urgent Care), by calling your provider’s office, or by asking your pharmacy to contact your provider’s office with a refill request. Medication refills are processed only during regular business hours and may not be available until the next business day. Your provider may request additional information or to have a follow-up visit with you prior to refilling your medication.   *Please Note: Medication refills are assigned a new Rx number when refilled electronically. Your pharmacy may indicate that no refills were authorized even though a new prescription for the same medication is available at the pharmacy. Please request the medicine by name with the pharmacy before contacting your provider for a refill.        Instructions    1) Continue BIPAP at 20/07joG78  2) Clean mask and supplies weekly and change them as insurance allows - add heated tubing  3) Vaccines: Up to date with Pneumovax 23  4) Return in about 1 year (around 8/24/2018) for Compliance, review of symptoms, if not sooner, follow up with RAMÓN Noyola.           Other Notes About Your Plan     DME:  [facility]  / darshana [phone] / fax [phone]             "[employer] Access Code: Activation code not generated  Current "[employer] Status: Active

## 2017-08-24 NOTE — PATIENT INSTRUCTIONS
1) Continue BIPAP at 20/87olL52  2) Clean mask and supplies weekly and change them as insurance allows - add heated tubing  3) Vaccines: Up to date with Pneumovax 23  4) Return in about 1 year (around 8/24/2018) for Compliance, review of symptoms, if not sooner, follow up with RAMÓN Noyola.

## 2017-09-01 RX ORDER — INSULIN GLARGINE 100 [IU]/ML
INJECTION, SOLUTION SUBCUTANEOUS
Qty: 45 PEN | Refills: 2 | Status: SHIPPED | OUTPATIENT
Start: 2017-09-01 | End: 2018-10-04 | Stop reason: SDUPTHER

## 2017-09-14 ENCOUNTER — TELEPHONE (OUTPATIENT)
Dept: MEDICAL GROUP | Facility: MEDICAL CENTER | Age: 62
End: 2017-09-14

## 2017-09-14 NOTE — TELEPHONE ENCOUNTER
----- Message from Marvin Barreto M.D. sent at 9/14/2017  7:21 AM PDT -----  Please notify patient that his INR is slightly elevated at 3.4. Please ask how much coumadin he is taking and let me know, because we will need to go down just a little on his dosing.  Marvin Barreto M.D.

## 2017-09-25 ENCOUNTER — PATIENT MESSAGE (OUTPATIENT)
Dept: FAMILY PLANNING/WOMEN'S HEALTH CLINIC | Facility: OTHER | Age: 62
End: 2017-09-25

## 2017-09-26 RX ORDER — WARFARIN SODIUM 5 MG/1
TABLET ORAL
Qty: 180 TAB | Refills: 1 | Status: SHIPPED | DISCHARGE
Start: 2017-09-26 | End: 2017-11-22 | Stop reason: SDUPTHER

## 2017-09-29 RX ORDER — LISINOPRIL 5 MG/1
TABLET ORAL
Qty: 90 TAB | Refills: 3 | Status: SHIPPED | OUTPATIENT
Start: 2017-09-29 | End: 2018-11-28 | Stop reason: SDUPTHER

## 2017-11-07 ENCOUNTER — TELEPHONE (OUTPATIENT)
Dept: MEDICAL GROUP | Facility: MEDICAL CENTER | Age: 62
End: 2017-11-07

## 2017-11-08 NOTE — TELEPHONE ENCOUNTER
----- Message from Marvin Barreto M.D. sent at 11/7/2017  3:33 PM PST -----  Please notify patient and INR is slightly low at 1.6. Please confirm that she has been taking his Coumadin and please ask her what dose she is taking.  Marvin Barreto M.D.

## 2017-11-15 ENCOUNTER — HOSPITAL ENCOUNTER (OUTPATIENT)
Dept: LAB | Facility: MEDICAL CENTER | Age: 62
End: 2017-11-15
Attending: FAMILY MEDICINE
Payer: COMMERCIAL

## 2017-11-15 DIAGNOSIS — E78.00 PURE HYPERCHOLESTEROLEMIA: ICD-10-CM

## 2017-11-15 DIAGNOSIS — E55.9 VITAMIN D INSUFFICIENCY: ICD-10-CM

## 2017-11-15 LAB
25(OH)D3 SERPL-MCNC: 16 NG/ML (ref 30–100)
ALBUMIN SERPL BCP-MCNC: 3.8 G/DL (ref 3.2–4.9)
ALBUMIN/GLOB SERPL: 0.9 G/DL
ALP SERPL-CCNC: 60 U/L (ref 30–99)
ALT SERPL-CCNC: 52 U/L (ref 2–50)
ANION GAP SERPL CALC-SCNC: 7 MMOL/L (ref 0–11.9)
AST SERPL-CCNC: 50 U/L (ref 12–45)
BILIRUB SERPL-MCNC: 0.6 MG/DL (ref 0.1–1.5)
BUN SERPL-MCNC: 15 MG/DL (ref 8–22)
CALCIUM SERPL-MCNC: 9.3 MG/DL (ref 8.5–10.5)
CHLORIDE SERPL-SCNC: 104 MMOL/L (ref 96–112)
CHOLEST SERPL-MCNC: 180 MG/DL (ref 100–199)
CO2 SERPL-SCNC: 23 MMOL/L (ref 20–33)
CREAT SERPL-MCNC: 1.07 MG/DL (ref 0.5–1.4)
EST. AVERAGE GLUCOSE BLD GHB EST-MCNC: 223 MG/DL
GFR SERPL CREATININE-BSD FRML MDRD: >60 ML/MIN/1.73 M 2
GLOBULIN SER CALC-MCNC: 4.1 G/DL (ref 1.9–3.5)
GLUCOSE SERPL-MCNC: 170 MG/DL (ref 65–99)
HBA1C MFR BLD: 9.4 % (ref 0–5.6)
HDLC SERPL-MCNC: 50 MG/DL
LDLC SERPL CALC-MCNC: 89 MG/DL
POTASSIUM SERPL-SCNC: 4.5 MMOL/L (ref 3.6–5.5)
PROT SERPL-MCNC: 7.9 G/DL (ref 6–8.2)
SODIUM SERPL-SCNC: 134 MMOL/L (ref 135–145)
TRIGL SERPL-MCNC: 203 MG/DL (ref 0–149)
TSH SERPL DL<=0.005 MIU/L-ACNC: 2.38 UIU/ML (ref 0.3–3.7)

## 2017-11-15 PROCEDURE — 83036 HEMOGLOBIN GLYCOSYLATED A1C: CPT

## 2017-11-15 PROCEDURE — 80061 LIPID PANEL: CPT

## 2017-11-15 PROCEDURE — 82570 ASSAY OF URINE CREATININE: CPT

## 2017-11-15 PROCEDURE — 80053 COMPREHEN METABOLIC PANEL: CPT

## 2017-11-15 PROCEDURE — 36415 COLL VENOUS BLD VENIPUNCTURE: CPT

## 2017-11-15 PROCEDURE — 82306 VITAMIN D 25 HYDROXY: CPT

## 2017-11-15 PROCEDURE — 84443 ASSAY THYROID STIM HORMONE: CPT

## 2017-11-15 PROCEDURE — 82043 UR ALBUMIN QUANTITATIVE: CPT

## 2017-11-16 ENCOUNTER — TELEPHONE (OUTPATIENT)
Dept: MEDICAL GROUP | Facility: MEDICAL CENTER | Age: 62
End: 2017-11-16

## 2017-11-16 NOTE — TELEPHONE ENCOUNTER
----- Message from Marvin Barreto M.D. sent at 11/16/2017  7:23 AM PST -----  Please have patient schedule appointment to discuss recent lab results. Not urgent or critical. Diabetes is elevated again.  Marvin Barreto MD

## 2017-11-17 LAB
CREAT UR-MCNC: 195.4 MG/DL
MICROALBUMIN UR-MCNC: 1.6 MG/DL
MICROALBUMIN/CREAT UR: 8 MG/G (ref 0–30)

## 2017-11-20 ENCOUNTER — TELEPHONE (OUTPATIENT)
Dept: MEDICAL GROUP | Facility: MEDICAL CENTER | Age: 62
End: 2017-11-20

## 2017-11-20 NOTE — LETTER
November 27, 2017        Jethro Guillen  [address] [place] [address]        Dear Jethro:    Please have patient schedule appointment to discuss recent lab results. Not urgent or critical.      If you have any questions or concerns, please don't hesitate to call.        Sincerely,        Marvin Barreto M.D.    Electronically Signed

## 2017-11-20 NOTE — TELEPHONE ENCOUNTER
----- Message from Marvin Barreto M.D. sent at 11/20/2017  7:35 AM PST -----  Please have patient schedule appointment to discuss recent lab results. Not urgent or critical.  Marvin Barreto MD

## 2017-11-22 ENCOUNTER — PATIENT MESSAGE (OUTPATIENT)
Dept: MEDICAL GROUP | Facility: MEDICAL CENTER | Age: 62
End: 2017-11-22

## 2017-11-22 RX ORDER — WARFARIN SODIUM 1 MG/1
2 TABLET ORAL DAILY
Qty: 180 TAB | Refills: 3 | Status: SHIPPED | OUTPATIENT
Start: 2017-11-22 | End: 2018-08-30

## 2017-11-22 RX ORDER — WARFARIN SODIUM 5 MG/1
10 TABLET ORAL DAILY
Qty: 180 TAB | Refills: 1 | Status: SHIPPED | OUTPATIENT
Start: 2017-11-22 | End: 2018-08-16 | Stop reason: SDUPTHER

## 2017-11-23 NOTE — TELEPHONE ENCOUNTER
From: Jethro Guillen  To: Marvin Barreto M.D.  Sent: 11/22/2017 3:55 PM PST  Subject: Test Result Question    My last INR came in at 1.8 I am taking 11mm warfarin a day. Should I have a different dosage? Also I took a blood and urine panel last week should I schedule an appointment to come in?

## 2017-11-28 NOTE — TELEPHONE ENCOUNTER
Tried to contact pt, Left message to call back [phone]  [employer] message and letter sent to pt

## 2017-12-07 RX ORDER — GLIPIZIDE 5 MG/1
5 TABLET ORAL 2 TIMES DAILY
Qty: 180 TAB | Refills: 3 | Status: SHIPPED | OUTPATIENT
Start: 2017-12-07 | End: 2018-04-11 | Stop reason: SDUPTHER

## 2017-12-27 ENCOUNTER — OFFICE VISIT (OUTPATIENT)
Dept: MEDICAL GROUP | Facility: MEDICAL CENTER | Age: 62
End: 2017-12-27
Payer: COMMERCIAL

## 2017-12-27 VITALS
TEMPERATURE: 95.9 F | BODY MASS INDEX: 46.38 KG/M2 | DIASTOLIC BLOOD PRESSURE: 76 MMHG | HEART RATE: 104 BPM | OXYGEN SATURATION: 94 % | SYSTOLIC BLOOD PRESSURE: 118 MMHG | HEIGHT: 66 IN | WEIGHT: 288.6 LBS

## 2017-12-27 DIAGNOSIS — E78.00 PURE HYPERCHOLESTEROLEMIA: ICD-10-CM

## 2017-12-27 DIAGNOSIS — E66.01 MORBID OBESITY WITH BMI OF 45.0-49.9, ADULT (HCC): ICD-10-CM

## 2017-12-27 DIAGNOSIS — K76.0 FATTY LIVER: ICD-10-CM

## 2017-12-27 DIAGNOSIS — E55.9 VITAMIN D DEFICIENCY: ICD-10-CM

## 2017-12-27 DIAGNOSIS — I10 ESSENTIAL HYPERTENSION: ICD-10-CM

## 2017-12-27 PROCEDURE — 99214 OFFICE O/P EST MOD 30 MIN: CPT | Performed by: FAMILY MEDICINE

## 2017-12-27 ASSESSMENT — PATIENT HEALTH QUESTIONNAIRE - PHQ9: CLINICAL INTERPRETATION OF PHQ2 SCORE: 0

## 2017-12-27 NOTE — ASSESSMENT & PLAN NOTE
Patient is tolerating lisinopril 5 mg daily, Toprol 25 mg twice daily and spironolactone 50 mg daily. Potassium is within the normal range.

## 2017-12-27 NOTE — PROGRESS NOTES
Subjective:   Jethro Guillen is a 62 y.o. male here today for Diabetes    HTN (hypertension)  Patient is tolerating lisinopril 5 mg daily, Toprol 25 mg twice daily and spironolactone 50 mg daily. Potassium is within the normal range.    Fatty liver  Patient has known fatty liver disease on ultrasound in 2011. She had elevated liver enzymes again, although slightly better than last time.    Morbid obesity with BMI of 45.0-49.9, adult  Patient admits to eating poorly because she works late at the [employer].    Hyperlipidemia  Patient is tolerating fenofibrate 54 mg daily.    Vitamin D deficiency  Patient is not taking vitamin D supplementation.    Diabetes mellitus type 2, uncontrolled  A1c has increased from 7.1 up to 9.4.  She admits that over the last 6 months she has been doing a lot of traveling and has missed her diabetes medication, particularly the morning medication frequently. She is not planning to travel and will get back on taking her medication as prescribed. She is currently prescribed glipizide 5 mg twice a day, metformin 1000 g twice a day and Lantus 44 units daily.         Current medicines (including changes today)  Current Outpatient Prescriptions   Medication Sig Dispense Refill   • glipiZIDE (GLUCOTROL) 5 MG Tab TAKE 1 TAB BY MOUTH 2 TIMES A DAY. 180 Tab 3   • warfarin (COUMADIN) 1 MG Tab Take 2 Tabs by mouth every day. 180 Tab 3   • warfarin (COUMADIN) 5 MG Tab Take 2 Tabs by mouth every day. 180 Tab 1   • lisinopril (PRINIVIL) 5 MG Tab TAKE ONE TABLET BY MOUTH ONE TIME DAILY 90 Tab 3   • LANTUS SOLOSTAR 100 UNIT/ML Solution Pen-injector injection INJECT 44 UNITS SUBCUTANEOUSLY EVERY EVENING AS DIRECTED BY PHYSICIAN 45 PEN 2   • metformin (GLUCOPHAGE) 1000 MG tablet TAKE 1 TABLET BY MOUTH TWICE A DAY WITH MEALS 180 Tab 0   • pravastatin (PRAVACHOL) 80 MG tablet TAKE ONE TABLET BY MOUTH NIGHTLY AT BEDTIME 90 Tab 3   • aspirin (ASA) 325 MG Tab Take  by mouth.     • fenofibrate (TRICOR) 54 MG  "tablet Take  by mouth.     • neomycin-polymyxin-HC (PEDIOTIC HC) 3.5-12739-3 Suspension Place 8 Drops in right ear.     • ofloxacin otic sol (FLOXIN OTIC) 0.3 % Solution Place 5 Drops in right ear.     • omeprazole (PRILOSEC) 20 MG delayed-release capsule Take  by mouth.     • estradiol (ESTRACE) 1 MG Tab TAKE 2 & 1/2 TABLETS BY MOUTH EVERY DAY 75 Tab 11   • fluticasone (FLONASE) 50 MCG/ACT nasal spray Spray 2 Sprays in nose every day. 16 g 3   • spironolactone (ALDACTONE) 50 MG Tab TAKE ONE TABLET BY MOUTH TWICE DAILY 180 Tab 3   • metoprolol (LOPRESSOR) 25 MG Tab Take 1 Tab by mouth 2 times a day. 180 Tab 3   • estradiol (ESTRACE) 2 MG Tab TAKE 2 & 1/2 TABLETS BY MOUTH EVERY  Tab 3   • docosahexanoic acid (FISH OIL) 1000 MG CAPS Take 1 Cap by mouth 2 times a day. 60 Cap 6     No current facility-administered medications for this visit.      He  has a past medical history of Atrial fibrillation (CMS-HCC) (1/23/2012); Chronic anticoagulation (1/23/2012); Diabetes (CMS-HCC); 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; Obesity (1/23/2012); Obstructive sleep apnea (1/23/2012); OM (otitis media); and Reflux. He also has no past medical history of Asthma or COPD (chronic obstructive pulmonary disease) (CMS-HCC).    ROS   No chest pain, no shortness of breath       Objective:     Blood pressure 118/76, pulse (!) 104, temperature (!) 35.5 °C (95.9 °F), height 1.676 m (5' 5.98\"), weight (!) 130.9 kg (288 lb 9.6 oz), SpO2 94 %. Body mass index is 46.61 kg/m².   Physical Exam:  Constitutional: Alert, no distress.  Skin: Warm, dry, good turgor, no rashes in visible areas.  Eye: Equal, round and reactive, conjunctiva clear, lids normal.  Psych: Alert and oriented x3, normal affect and mood.        Assessment and Plan:   The following treatment plan was discussed    1. Uncontrolled type 2 diabetes mellitus with complication, with long-term current use of insulin (CMS-HCC)  Encourage patient be compliant with medication and " diet. Follow-up with labs in 3 months.  - HEMOGLOBIN A1C; Future  - COMP METABOLIC PANEL; Future  - MICROALBUMIN CREAT RATIO URINE; Future  - LIPID PROFILE; Future    2. Pure hypercholesterolemia  Continue fenofibrate. Follow-up in labs with 3 months.    3. Essential hypertension  Controlled. Continue current medications.    4. Vitamin D deficiency  Uncontrolled. Advised patient to start vitamin D 5000 international units daily with food.    5. Morbid obesity with BMI of 45.0-49.9, adult (CMS-[facility])  - Patient identified as having weight management issue.  Appropriate orders and counseling given.    6. Fatty liver  Advised diet and exercise and weight loss.      Followup: Return in about 3 months (around 3/27/2018) for Diabetes.

## 2017-12-27 NOTE — ASSESSMENT & PLAN NOTE
A1c has increased from 7.1 up to 9.4.  She admits that over the last 6 months she has been doing a lot of traveling and has missed her diabetes medication, particularly the morning medication frequently. She is not planning to travel and will get back on taking her medication as prescribed. She is currently prescribed glipizide 5 mg twice a day, metformin 1000 g twice a day and Lantus 44 units daily.

## 2017-12-27 NOTE — ASSESSMENT & PLAN NOTE
Patient has known fatty liver disease on ultrasound in 2011. She had elevated liver enzymes again, although slightly better than last time.

## 2018-01-16 ENCOUNTER — TELEPHONE (OUTPATIENT)
Dept: SLEEP MEDICINE | Facility: MEDICAL CENTER | Age: 63
End: 2018-01-16

## 2018-01-17 ENCOUNTER — PATIENT MESSAGE (OUTPATIENT)
Dept: MEDICAL GROUP | Facility: MEDICAL CENTER | Age: 63
End: 2018-01-17

## 2018-01-17 RX ORDER — ESTRADIOL 1 MG/1
TABLET ORAL
Qty: 75 TAB | Refills: 11 | Status: SHIPPED | OUTPATIENT
Start: 2018-01-17 | End: 2018-01-17

## 2018-01-17 RX ORDER — ESTRADIOL 2 MG/1
TABLET ORAL
Qty: 225 TAB | Refills: 3 | Status: SHIPPED | OUTPATIENT
Start: 2018-01-17 | End: 2018-11-12 | Stop reason: SDUPTHER

## 2018-01-17 NOTE — TELEPHONE ENCOUNTER
From: Jethro Guillen  To: Marvin Barreto M.D.  Sent: 2018 11:56 AM PST  Subject: Prescription Question    Thanks for your quick response. My current estradiol dosage for the last few years has been for 2mg tablets, take 2.5 tablets once per day. The 1mg version was the initial dosage that my previous physician, Dr. Patti Carrillo started me on several years ago. She later increased the dosage to the 5mg per day level described above which I have been on ever since. When I called [facility] to get a refill authorization they requested authorization for the 1mg version instead of the 2mg version I have been on. if possible please cancel the 1mg estradiol authorization and approve the 2mg refill.     Thanks,    Carla Guillen  ----- Message -----  From: Marvin Barreto M.D.  Sent: 2018 7:35 AM PST  To: CARLA Guillen  Subject: RE: Prescription Question  Carla,    I have sent in a refill for estradiol 1 mg tablets, take 2.5 tablets per day, to [facility] pharmacy in [facility].    Dr. Barreto    ----- Message -----   From: CARLA Guillen   Sent: 2018 12:29 AM PST   To: Marvin Barreto M.D.  Subject: Prescription Question    my rx for Estrodial 5mg has . would it be possible for a refill order to be sent to [facility]?    ThanksTR

## 2018-01-17 NOTE — TELEPHONE ENCOUNTER
From: Jethro Guillen  To: Marvin Barreto M.D.  Sent: 2018 12:29 AM PST  Subject: Prescription Question    my rx for Estrodial 5mg has . would it be possible for a refill order to be sent to [facility]?    Thanks,    K

## 2018-01-18 NOTE — TELEPHONE ENCOUNTER
Called [facility] they did conformed order was received and in pending.     Left message informed order was sent and conformed by rendon it was received

## 2018-02-06 DIAGNOSIS — I10 ESSENTIAL HYPERTENSION: ICD-10-CM

## 2018-02-15 ENCOUNTER — TELEPHONE (OUTPATIENT)
Dept: MEDICAL GROUP | Facility: MEDICAL CENTER | Age: 63
End: 2018-02-15

## 2018-02-15 NOTE — TELEPHONE ENCOUNTER
----- Message from Marvin Barreto M.D. sent at 2/15/2018 12:54 PM PST -----  INR is in therapeutic range. Continue current dose of Coumadin. Recheck INR in 1 month.  Marvin Barreto MD

## 2018-03-27 ENCOUNTER — TELEPHONE (OUTPATIENT)
Dept: MEDICAL GROUP | Facility: MEDICAL CENTER | Age: 63
End: 2018-03-27

## 2018-03-27 NOTE — TELEPHONE ENCOUNTER
Please notify patient that her INR is 1.4. Please ask if she is taking Coumadin or is she missing doses. If she is taking Coumadin, please let me know so we can increase her dose. If she has not been taking or missing doses, please have her take the medication as prescribed and recheck her INR in 2 weeks.  Marvin Barreto M.D.

## 2018-03-27 NOTE — LETTER
March 29, 2018        Jethro Guillen  [address] [place] [address]        Dear Jethro:    Marvin Barreto office has tried contacting you, at your earliest convenience please contact the office [phone]      If you have any questions or concerns, please don't hesitate to call.        Sincerely,        Marvin Barreto M.D.    Electronically Signed

## 2018-04-02 ENCOUNTER — PATIENT MESSAGE (OUTPATIENT)
Dept: MEDICAL GROUP | Facility: MEDICAL CENTER | Age: 63
End: 2018-04-02

## 2018-04-02 DIAGNOSIS — H65.90 FLUID LEVEL BEHIND TYMPANIC MEMBRANE, UNSPECIFIED LATERALITY: ICD-10-CM

## 2018-04-02 DIAGNOSIS — Z96.22 MYRINGOTOMY TUBE STATUS: ICD-10-CM

## 2018-04-02 NOTE — TELEPHONE ENCOUNTER
From: Jethro Guillen  To: Marvin Barreto M.D.  Sent: 4/2/2018 12:49 AM PDT  Subject: Non-Urgent Medical Question    Dr Barreto, I need a referal to see my ENT doc, Girish Carrasquillo for a follow-up on my tubes. Under the new terms of my insurance I have to get a referral once a year. I have been seeing Dr Carrasquillo for over seven years for my chronic hearing problems and loss.    Thank you for your time,    Nichelle

## 2018-04-06 ENCOUNTER — PATIENT OUTREACH (OUTPATIENT)
Dept: HEALTH INFORMATION MANAGEMENT | Facility: OTHER | Age: 63
End: 2018-04-06

## 2018-04-06 ENCOUNTER — HOSPITAL ENCOUNTER (OUTPATIENT)
Dept: LAB | Facility: MEDICAL CENTER | Age: 63
End: 2018-04-06
Attending: FAMILY MEDICINE
Payer: COMMERCIAL

## 2018-04-06 LAB
ALBUMIN SERPL BCP-MCNC: 3.6 G/DL (ref 3.2–4.9)
ALBUMIN/GLOB SERPL: 0.9 G/DL
ALP SERPL-CCNC: 57 U/L (ref 30–99)
ALT SERPL-CCNC: 42 U/L (ref 2–50)
ANION GAP SERPL CALC-SCNC: 6 MMOL/L (ref 0–11.9)
AST SERPL-CCNC: 45 U/L (ref 12–45)
BILIRUB SERPL-MCNC: 0.5 MG/DL (ref 0.1–1.5)
BUN SERPL-MCNC: 11 MG/DL (ref 8–22)
CALCIUM SERPL-MCNC: 9.1 MG/DL (ref 8.5–10.5)
CHLORIDE SERPL-SCNC: 104 MMOL/L (ref 96–112)
CHOLEST SERPL-MCNC: 155 MG/DL (ref 100–199)
CO2 SERPL-SCNC: 24 MMOL/L (ref 20–33)
CREAT SERPL-MCNC: 0.93 MG/DL (ref 0.5–1.4)
CREAT UR-MCNC: 136.3 MG/DL
EST. AVERAGE GLUCOSE BLD GHB EST-MCNC: 258 MG/DL
GLOBULIN SER CALC-MCNC: 4.1 G/DL (ref 1.9–3.5)
GLUCOSE SERPL-MCNC: 207 MG/DL (ref 65–99)
HBA1C MFR BLD: 10.6 % (ref 0–5.6)
HDLC SERPL-MCNC: 44 MG/DL
LDLC SERPL CALC-MCNC: 65 MG/DL
MICROALBUMIN UR-MCNC: 2.3 MG/DL
MICROALBUMIN/CREAT UR: 17 MG/G (ref 0–30)
POTASSIUM SERPL-SCNC: 4.3 MMOL/L (ref 3.6–5.5)
PROT SERPL-MCNC: 7.7 G/DL (ref 6–8.2)
SODIUM SERPL-SCNC: 134 MMOL/L (ref 135–145)
TRIGL SERPL-MCNC: 230 MG/DL (ref 0–149)

## 2018-04-06 PROCEDURE — 82570 ASSAY OF URINE CREATININE: CPT

## 2018-04-06 PROCEDURE — 83036 HEMOGLOBIN GLYCOSYLATED A1C: CPT

## 2018-04-06 PROCEDURE — 80061 LIPID PANEL: CPT

## 2018-04-06 PROCEDURE — 36415 COLL VENOUS BLD VENIPUNCTURE: CPT

## 2018-04-06 PROCEDURE — 82043 UR ALBUMIN QUANTITATIVE: CPT

## 2018-04-06 PROCEDURE — 80053 COMPREHEN METABOLIC PANEL: CPT

## 2018-04-06 NOTE — TELEPHONE ENCOUNTER
Pt in office    Pt states that he takes his medication as prescribed, he may miss a dose once a week but that is it.      Per Dr Barreto  Have the pt recheck INR next week, if it is still low, then we will discuss changing dose.    Pt informed and will wait to hear from us next week.

## 2018-04-10 ENCOUNTER — TELEPHONE (OUTPATIENT)
Dept: MEDICAL GROUP | Facility: MEDICAL CENTER | Age: 63
End: 2018-04-10

## 2018-04-10 NOTE — PROGRESS NOTES
Outcome: Left Message to schedule immunizations    Please transfer to Patient Outreach Team at [phone] when patient returns call.    WebIZ Checked & Epic Updated:  yes    HealthConnect Verified: no    Attempt # 1

## 2018-04-10 NOTE — TELEPHONE ENCOUNTER
----- Message from Marvin Barreto M.D. sent at 4/10/2018  2:58 PM PDT -----  Please notify patient that her INR is 3.4, this is above the 2-3 range that we would like.  Please ask how much Coumadin patient is taking.  Marvin Barreto M.D.

## 2018-04-11 ENCOUNTER — OFFICE VISIT (OUTPATIENT)
Dept: MEDICAL GROUP | Facility: MEDICAL CENTER | Age: 63
End: 2018-04-11
Payer: COMMERCIAL

## 2018-04-11 VITALS
HEIGHT: 66 IN | SYSTOLIC BLOOD PRESSURE: 114 MMHG | TEMPERATURE: 96.4 F | OXYGEN SATURATION: 90 % | DIASTOLIC BLOOD PRESSURE: 88 MMHG | BODY MASS INDEX: 46.28 KG/M2 | RESPIRATION RATE: 16 BRPM | WEIGHT: 288 LBS | HEART RATE: 83 BPM

## 2018-04-11 DIAGNOSIS — Z23 NEED FOR VACCINATION: ICD-10-CM

## 2018-04-11 DIAGNOSIS — E78.00 PURE HYPERCHOLESTEROLEMIA: ICD-10-CM

## 2018-04-11 DIAGNOSIS — I48.20 CHRONIC ATRIAL FIBRILLATION (HCC): ICD-10-CM

## 2018-04-11 DIAGNOSIS — Z12.11 SCREENING FOR COLORECTAL CANCER: ICD-10-CM

## 2018-04-11 DIAGNOSIS — Z12.12 SCREENING FOR COLORECTAL CANCER: ICD-10-CM

## 2018-04-11 PROCEDURE — 90471 IMMUNIZATION ADMIN: CPT | Performed by: FAMILY MEDICINE

## 2018-04-11 PROCEDURE — 90715 TDAP VACCINE 7 YRS/> IM: CPT | Performed by: FAMILY MEDICINE

## 2018-04-11 PROCEDURE — 99214 OFFICE O/P EST MOD 30 MIN: CPT | Mod: 25 | Performed by: FAMILY MEDICINE

## 2018-04-11 RX ORDER — GLIPIZIDE 5 MG/1
5 TABLET ORAL 2 TIMES DAILY
Qty: 180 TAB | Refills: 3 | Status: SHIPPED | OUTPATIENT
Start: 2018-04-11 | End: 2019-05-22 | Stop reason: SDUPTHER

## 2018-04-11 RX ORDER — PIOGLITAZONEHYDROCHLORIDE 30 MG/1
30 TABLET ORAL DAILY
Qty: 90 TAB | Refills: 3 | Status: SHIPPED | OUTPATIENT
Start: 2018-04-11 | End: 2019-04-27 | Stop reason: SDUPTHER

## 2018-04-11 NOTE — ASSESSMENT & PLAN NOTE
Taking Lantus 44 units once daily, glipizide 5 mg twice daily, metformin 1000 mg daily.  A1c of 10.6.  Diet has gotten off track, eating too many carbohydrates. Has changed diet the last few days.  Not checking blood sugar regularly. The other day his blood sugar was 195.

## 2018-04-11 NOTE — PROGRESS NOTES
Subjective:   Caren Guillen is a 62 y.o. male here today for diabetes, A. fib    Atrial fibrillation  Coumadin 13 mg M, W, F and 11 mg T, Th, Sa, Su.  INR was 3.5.    Diabetes mellitus type 2, uncontrolled  Taking Lantus 44 units once daily, glipizide 5 mg twice daily, metformin 1000 mg daily.  A1c of 10.6.  Diet has gotten off track, eating too many carbohydrates. Has changed diet the last few days.  Not checking blood sugar regularly. The other day his blood sugar was 195.    Hyperlipidemia  Taking fenofibrate 54 mg daily and pravastatin 80 mg daily.       Results for CAREN GUILLEN (MRN [id number]) as of 4/11/2018 11:58   Ref. Range 4/6/2018 12:50 4/6/2018 12:51   Sodium Latest Ref Range: 135 - 145 mmol/L 134 (L)    Potassium Latest Ref Range: 3.6 - 5.5 mmol/L 4.3    Chloride Latest Ref Range: 96 - 112 mmol/L 104    Co2 Latest Ref Range: 20 - 33 mmol/L 24    Anion Gap Latest Ref Range: 0.0 - 11.9  6.0    Glucose Latest Ref Range: 65 - 99 mg/dL 207 (H)    Bun Latest Ref Range: 8 - 22 mg/dL 11    Creatinine Latest Ref Range: 0.50 - 1.40 mg/dL 0.93    GFR If  Latest Ref Range: >60 mL/min/1.73 m 2 >60    GFR If Non  Latest Ref Range: >60 mL/min/1.73 m 2 >60    Calcium Latest Ref Range: 8.5 - 10.5 mg/dL 9.1    AST(SGOT) Latest Ref Range: 12 - 45 U/L 45    ALT(SGPT) Latest Ref Range: 2 - 50 U/L 42    Alkaline Phosphatase Latest Ref Range: 30 - 99 U/L 57    Total Bilirubin Latest Ref Range: 0.1 - 1.5 mg/dL 0.5    Albumin Latest Ref Range: 3.2 - 4.9 g/dL 3.6    Total Protein Latest Ref Range: 6.0 - 8.2 g/dL 7.7    Globulin Latest Ref Range: 1.9 - 3.5 g/dL 4.1 (H)    A-G Ratio Latest Units: g/dL 0.9    Glycohemoglobin Latest Ref Range: 0.0 - 5.6 % 10.6 (H)    Estim. Avg Glu Latest Units: mg/dL 258    Cholesterol,Tot Latest Ref Range: 100 - 199 mg/dL 155    Triglycerides Latest Ref Range: 0 - 149 mg/dL 230 (H)    HDL Latest Ref Range: >=40 mg/dL 44    LDL Latest Ref Range: <100 mg/dL 65    Micro Alb  Creat Ratio Latest Ref Range: 0 - 30 mg/g  17   Creatinine, Urine Latest Units: mg/dL  136.30   Microalbumin, Urine Random Latest Units: mg/dL  2.3       Current medicines (including changes today)  Current Outpatient Prescriptions   Medication Sig Dispense Refill   • glipiZIDE (GLUCOTROL) 5 MG Tab Take 1 Tab by mouth 2 times a day. 180 Tab 3   • pioglitazone (ACTOS) 30 MG Tab Take 1 Tab by mouth every day. 90 Tab 3   • metoprolol (LOPRESSOR) 25 MG Tab TAKE 1 TABLET BY MOUTH 2 TIMES A DAY. 180 Tab 3   • estradiol (ESTRACE) 2 MG Tab TAKE 2 & 1/2 TABLETS BY MOUTH EVERY  Tab 3   • warfarin (COUMADIN) 1 MG Tab Take 2 Tabs by mouth every day. 180 Tab 3   • warfarin (COUMADIN) 5 MG Tab Take 2 Tabs by mouth every day. 180 Tab 1   • lisinopril (PRINIVIL) 5 MG Tab TAKE ONE TABLET BY MOUTH ONE TIME DAILY 90 Tab 3   • LANTUS SOLOSTAR 100 UNIT/ML Solution Pen-injector injection INJECT 44 UNITS SUBCUTANEOUSLY EVERY EVENING AS DIRECTED BY PHYSICIAN 45 PEN 2   • metformin (GLUCOPHAGE) 1000 MG tablet TAKE 1 TABLET BY MOUTH TWICE A DAY WITH MEALS 180 Tab 0   • pravastatin (PRAVACHOL) 80 MG tablet TAKE ONE TABLET BY MOUTH NIGHTLY AT BEDTIME 90 Tab 3   • aspirin (ASA) 325 MG Tab Take  by mouth.     • fenofibrate (TRICOR) 54 MG tablet Take  by mouth.     • neomycin-polymyxin-HC (PEDIOTIC HC) 3.5-90349-2 Suspension Place 8 Drops in right ear.     • ofloxacin otic sol (FLOXIN OTIC) 0.3 % Solution Place 5 Drops in right ear.     • omeprazole (PRILOSEC) 20 MG delayed-release capsule Take  by mouth.     • fluticasone (FLONASE) 50 MCG/ACT nasal spray Spray 2 Sprays in nose every day. 16 g 3   • spironolactone (ALDACTONE) 50 MG Tab TAKE ONE TABLET BY MOUTH TWICE DAILY 180 Tab 3   • docosahexanoic acid (FISH OIL) 1000 MG CAPS Take 1 Cap by mouth 2 times a day. 60 Cap 6     No current facility-administered medications for this visit.      He  has a past medical history of Atrial fibrillation (CMS-HCC) (1/23/2012); Chronic anticoagulation  "(1/23/2012); Diabetes (CMS-HCC); 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; Obesity (1/23/2012); Obstructive sleep apnea (1/23/2012); OM (otitis media); and Reflux. He also has no past medical history of Asthma or COPD (chronic obstructive pulmonary disease) (CMS-HCC).    ROS   No chest pain, no shortness of breath, no abdominal pain       Objective:     Blood pressure 114/88, pulse 83, temperature (!) 35.8 °C (96.4 °F), resp. rate 16, height 1.676 m (5' 5.98\"), weight (!) 130.6 kg (288 lb), SpO2 90 %. Body mass index is 46.51 kg/m².   Physical Exam:  Constitutional: Alert, no distress.  Skin: Warm, dry, good turgor, no rashes in visible areas.  Eye: Equal, round and reactive, conjunctiva clear, lids normal.  Respiratory: Unlabored respiratory effort, lungs clear to auscultation, no wheezes, no ronchi.  Psych: Alert and oriented x3, normal affect and mood.        Assessment and Plan:   The following treatment plan was discussed    1. Chronic atrial fibrillation (CMS-HCC)  Uncontrolled with elevated INR. Advised patient to decrease Coumadin from 13 mg Monday, Wednesday, Friday and 11 mg the rest of the week to 12 mg Monday, Wednesday, Friday 11 mg the rest of the week.  Recheck INR in 1-2 weeks.    2. Uncontrolled type 2 diabetes mellitus with complication, with long-term current use of insulin (CMS-HCC)  Uncontrolled. Start Actos 30 mg daily. Follow-up with labs in 3 months. Encourage patient to improve diet.  - glipiZIDE (GLUCOTROL) 5 MG Tab; Take 1 Tab by mouth 2 times a day.  Dispense: 180 Tab; Refill: 3  - pioglitazone (ACTOS) 30 MG Tab; Take 1 Tab by mouth every day.  Dispense: 90 Tab; Refill: 3  - COMP METABOLIC PANEL; Future  - HEMOGLOBIN A1C; Future  - MICROALBUMIN CREAT RATIO URINE; Future  - LIPID PROFILE; Future    3. Pure hypercholesterolemia  Controlled LDL, uncontrolled triglycerides. Advised patient to improve diet.    4. Screening for colorectal cancer  Patient declines colonoscopy. Fit ordered  - " OCCULT BLOOD FECES IMMUNOASSAY (FIT); Future    5. Need for vaccination  - TDAP VACCINE =>8YO IM      Followup: Return in about 3 months (around 7/11/2018) for Diabetes.

## 2018-04-30 ENCOUNTER — TELEPHONE (OUTPATIENT)
Dept: MEDICAL GROUP | Facility: MEDICAL CENTER | Age: 63
End: 2018-04-30

## 2018-04-30 NOTE — TELEPHONE ENCOUNTER
----- Message from Marvin Barreto M.D. sent at 4/30/2018  8:33 AM PDT -----  Please notify the patient that his INR is slightly low at 1.8. I see that he had surgery recently, please ask how long he has been back on his Coumadin and what does he is taking.  Marvin Barreto M.D.

## 2018-05-01 NOTE — TELEPHONE ENCOUNTER
Have her take Coumadin 12 mg Monday through Friday and 11 mg Saturday and Sunday.  Recheck INR in 2 weeks.  Marvin Barreto M.D.

## 2018-05-18 ENCOUNTER — TELEPHONE (OUTPATIENT)
Dept: MEDICAL GROUP | Facility: MEDICAL CENTER | Age: 63
End: 2018-05-18

## 2018-05-18 NOTE — TELEPHONE ENCOUNTER
----- Message from Marvin Barreto M.D. sent at 5/18/2018  1:48 PM PDT -----  INR is in therapeutic range at 2.5. Continue current dose of Coumadin. Recheck INR in 1 month.  Marvin Barreto MD

## 2018-06-15 RX ORDER — SPIRONOLACTONE 50 MG/1
TABLET, FILM COATED ORAL
Qty: 180 TAB | Refills: 3 | Status: SHIPPED | OUTPATIENT
Start: 2018-06-15 | End: 2019-07-07 | Stop reason: SDUPTHER

## 2018-07-12 ENCOUNTER — TELEPHONE (OUTPATIENT)
Dept: MEDICAL GROUP | Facility: MEDICAL CENTER | Age: 63
End: 2018-07-12

## 2018-07-12 DIAGNOSIS — G47.33 OSA TREATED WITH BIPAP: ICD-10-CM

## 2018-07-12 NOTE — TELEPHONE ENCOUNTER
Left message for patient to call back office at [phone] to schedule follow up appointment regarding diabetes.

## 2018-07-12 NOTE — TELEPHONE ENCOUNTER
----- Message from Brandon Faustin Ass't sent at 7/12/2018  3:49 PM PDT -----  Regarding: Referral needed   Contact: [phone]  Good afternoon,     The attached pt has a follow up appt with [facility] on 7/26/18. Due to pt's insurance being [employer]  a referral needs to be placed by PCP. Can you please place a referral?     Thank you  Dominga PULIDO  [facility] [phone]

## 2018-07-27 ENCOUNTER — HOSPITAL ENCOUNTER (OUTPATIENT)
Dept: LAB | Facility: MEDICAL CENTER | Age: 63
End: 2018-07-27
Attending: FAMILY MEDICINE
Payer: COMMERCIAL

## 2018-07-27 LAB
ALBUMIN SERPL BCP-MCNC: 3.9 G/DL (ref 3.2–4.9)
ALBUMIN/GLOB SERPL: 1.1 G/DL
ALP SERPL-CCNC: 53 U/L (ref 30–99)
ALT SERPL-CCNC: 44 U/L (ref 2–50)
ANION GAP SERPL CALC-SCNC: 8 MMOL/L (ref 0–11.9)
AST SERPL-CCNC: 45 U/L (ref 12–45)
BILIRUB SERPL-MCNC: 0.6 MG/DL (ref 0.1–1.5)
BUN SERPL-MCNC: 18 MG/DL (ref 8–22)
CALCIUM SERPL-MCNC: 9.1 MG/DL (ref 8.5–10.5)
CHLORIDE SERPL-SCNC: 107 MMOL/L (ref 96–112)
CHOLEST SERPL-MCNC: 185 MG/DL (ref 100–199)
CO2 SERPL-SCNC: 21 MMOL/L (ref 20–33)
CREAT SERPL-MCNC: 1.05 MG/DL (ref 0.5–1.4)
CREAT UR-MCNC: 169.5 MG/DL
GLOBULIN SER CALC-MCNC: 3.5 G/DL (ref 1.9–3.5)
GLUCOSE SERPL-MCNC: 109 MG/DL (ref 65–99)
HDLC SERPL-MCNC: 54 MG/DL
LDLC SERPL CALC-MCNC: 95 MG/DL
MICROALBUMIN UR-MCNC: 1.4 MG/DL
MICROALBUMIN/CREAT UR: 8 MG/G (ref 0–30)
POTASSIUM SERPL-SCNC: 4.3 MMOL/L (ref 3.6–5.5)
PROT SERPL-MCNC: 7.4 G/DL (ref 6–8.2)
SODIUM SERPL-SCNC: 136 MMOL/L (ref 135–145)
TRIGL SERPL-MCNC: 178 MG/DL (ref 0–149)

## 2018-07-27 PROCEDURE — 82570 ASSAY OF URINE CREATININE: CPT

## 2018-07-27 PROCEDURE — 36415 COLL VENOUS BLD VENIPUNCTURE: CPT

## 2018-07-27 PROCEDURE — 83036 HEMOGLOBIN GLYCOSYLATED A1C: CPT

## 2018-07-27 PROCEDURE — 80053 COMPREHEN METABOLIC PANEL: CPT

## 2018-07-27 PROCEDURE — 82043 UR ALBUMIN QUANTITATIVE: CPT

## 2018-07-27 PROCEDURE — 80061 LIPID PANEL: CPT

## 2018-07-31 LAB
EST. AVERAGE GLUCOSE BLD GHB EST-MCNC: 151 MG/DL
HBA1C MFR BLD: 6.9 % (ref 0–5.6)

## 2018-08-15 ENCOUNTER — TELEPHONE (OUTPATIENT)
Dept: MEDICAL GROUP | Facility: MEDICAL CENTER | Age: 63
End: 2018-08-15

## 2018-08-15 NOTE — TELEPHONE ENCOUNTER
----- Message from Marvin Barreto M.D. sent at 8/15/2018  7:21 AM PDT -----  INR is low at 1.7. Please make sure patient is taking her coumadin and please ask what does she is taking.  Marvin Barreto M.D.

## 2018-08-15 NOTE — TELEPHONE ENCOUNTER
Pt notified   Pt states she is taking 12 mg  Monday, Wednesday and  Friday   11 mg Tuesday, Thursday, Saturday and Sunday

## 2018-08-16 RX ORDER — WARFARIN SODIUM 5 MG/1
10 TABLET ORAL DAILY
Qty: 180 TAB | Refills: 3 | Status: SHIPPED | OUTPATIENT
Start: 2018-08-16 | End: 2019-01-29

## 2018-08-20 ENCOUNTER — PATIENT OUTREACH (OUTPATIENT)
Dept: HEALTH INFORMATION MANAGEMENT | Facility: OTHER | Age: 63
End: 2018-08-20

## 2018-08-20 NOTE — PROGRESS NOTES
Outcome: Left Message    Please transfer to Patient Outreach Team at [phone] when patient returns call.    WebIZ Checked & Epic Updated:  yes    HealthConnect Verified: yes    Attempt # 1

## 2018-08-21 ENCOUNTER — OFFICE VISIT (OUTPATIENT)
Dept: MEDICAL GROUP | Facility: MEDICAL CENTER | Age: 63
End: 2018-08-21
Payer: COMMERCIAL

## 2018-08-21 VITALS
DIASTOLIC BLOOD PRESSURE: 74 MMHG | OXYGEN SATURATION: 93 % | WEIGHT: 297 LBS | BODY MASS INDEX: 47.73 KG/M2 | SYSTOLIC BLOOD PRESSURE: 108 MMHG | HEIGHT: 66 IN | HEART RATE: 82 BPM | TEMPERATURE: 96.1 F

## 2018-08-21 DIAGNOSIS — E11.9 CONTROLLED TYPE 2 DIABETES MELLITUS WITHOUT COMPLICATION, WITH LONG-TERM CURRENT USE OF INSULIN (HCC): ICD-10-CM

## 2018-08-21 DIAGNOSIS — E78.2 MIXED HYPERLIPIDEMIA: ICD-10-CM

## 2018-08-21 DIAGNOSIS — E66.01 MORBID OBESITY WITH BMI OF 45.0-49.9, ADULT (HCC): ICD-10-CM

## 2018-08-21 DIAGNOSIS — Z79.4 CONTROLLED TYPE 2 DIABETES MELLITUS WITHOUT COMPLICATION, WITH LONG-TERM CURRENT USE OF INSULIN (HCC): ICD-10-CM

## 2018-08-21 PROCEDURE — 99214 OFFICE O/P EST MOD 30 MIN: CPT | Performed by: FAMILY MEDICINE

## 2018-08-21 PROCEDURE — 92250 FUNDUS PHOTOGRAPHY W/I&R: CPT | Mod: TC | Performed by: FAMILY MEDICINE

## 2018-08-21 NOTE — ASSESSMENT & PLAN NOTE
Patient's weight has gone up 9 pounds in the last 1 month because of decreased activity from left ankle fracture which is still causing him pain.  He is being followed by a podiatrist in [facility].

## 2018-08-21 NOTE — PROGRESS NOTES
Subjective:   Jethro Guillen is a 62 y.o. male here today for diabetes    Controlled type 2 diabetes mellitus without complication, with long-term current use of insulin (HCC)  At her last visit we started Actos 30 mg daily in addition to his glipizide 5 mg twice daily, Lantus 44 units daily, metformin 1000 mg twice daily.  A1c improved from 10.6 down to 6.9.  He denies any changes in his diet.  His lifestyle has includes less activity because of a left foot fracture 4 months ago.    Hyperlipidemia  Patient is tolerating pravastatin 80 mg daily.    Morbid obesity with BMI of 45.0-49.9, adult  Patient's weight has gone up 9 pounds in the last 1 month because of decreased activity from left ankle fracture which is still causing him pain.  He is being followed by a podiatrist in [facility] [place].         Current medicines (including changes today)  Current Outpatient Prescriptions   Medication Sig Dispense Refill   • warfarin (COUMADIN) 5 MG Tab TAKE 2 TABS BY MOUTH EVERY DAY. 180 Tab 3   • metformin (GLUCOPHAGE) 1000 MG tablet TAKE 1 TABLET BY MOUTH TWICE A DAY WITH MEALS 180 Tab 3   • spironolactone (ALDACTONE) 50 MG Tab TAKE 1 TABLET BY MOUTH TWICE A  Tab 3   • glipiZIDE (GLUCOTROL) 5 MG Tab Take 1 Tab by mouth 2 times a day. 180 Tab 3   • pioglitazone (ACTOS) 30 MG Tab Take 1 Tab by mouth every day. 90 Tab 3   • metoprolol (LOPRESSOR) 25 MG Tab TAKE 1 TABLET BY MOUTH 2 TIMES A DAY. 180 Tab 3   • estradiol (ESTRACE) 2 MG Tab TAKE 2 & 1/2 TABLETS BY MOUTH EVERY  Tab 3   • warfarin (COUMADIN) 1 MG Tab Take 2 Tabs by mouth every day. 180 Tab 3   • lisinopril (PRINIVIL) 5 MG Tab TAKE ONE TABLET BY MOUTH ONE TIME DAILY 90 Tab 3   • LANTUS SOLOSTAR 100 UNIT/ML Solution Pen-injector injection INJECT 44 UNITS SUBCUTANEOUSLY EVERY EVENING AS DIRECTED BY PHYSICIAN 45 PEN 2   • pravastatin (PRAVACHOL) 80 MG tablet TAKE ONE TABLET BY MOUTH NIGHTLY AT BEDTIME 90 Tab 3   • aspirin (ASA) 325 MG Tab Take  by mouth.    "  • fenofibrate (TRICOR) 54 MG tablet Take  by mouth.     • neomycin-polymyxin-HC (PEDIOTIC HC) 3.5-10425-0 Suspension Place 8 Drops in right ear.     • ofloxacin otic sol (FLOXIN OTIC) 0.3 % Solution Place 5 Drops in right ear.     • omeprazole (PRILOSEC) 20 MG delayed-release capsule Take  by mouth.     • fluticasone (FLONASE) 50 MCG/ACT nasal spray Spray 2 Sprays in nose every day. 16 g 3   • docosahexanoic acid (FISH OIL) 1000 MG CAPS Take 1 Cap by mouth 2 times a day. 60 Cap 6     No current facility-administered medications for this visit.      He  has a past medical history of Atrial fibrillation ([facility]) (1/23/2012); Chronic anticoagulation (1/23/2012); Diabetes ([facility]); 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; Obesity (1/23/2012); Obstructive sleep apnea (1/23/2012); OM (otitis media); and Reflux. He also has no past medical history of Asthma or COPD (chronic obstructive pulmonary disease) ([facility]).    ROS   No chest pain, no shortness of breath       Objective:     Blood pressure 108/74, pulse 82, temperature (!) 35.6 °C (96.1 °F), height 1.676 m (5' 5.98\"), weight (!) 134.7 kg (297 lb), SpO2 93 %. Body mass index is 47.97 kg/m².   Physical Exam:  Constitutional: Alert, no distress.  Skin: Warm, dry, good turgor, no rashes in visible areas.  Eye: Equal, round and reactive, conjunctiva clear, lids normal.  Psych: Alert and oriented x3, normal affect and mood.        Assessment and Plan:   The following treatment plan was discussed    1. Controlled type 2 diabetes mellitus without complication, with long-term current use of insulin ([facility])  Controlled, continue current medications.  Follow-up with labs in 6 months.  Retinal eye exam done today in clinic.  - COMP METABOLIC PANEL; Future  - HEMOGLOBIN A1C; Future  - MICROALBUMIN CREAT RATIO URINE; Future  - LIPID PROFILE; Future  - POCT Retinal Eye Exam    2. Mixed hyperlipidemia  Stable.  Continue pravastatin.  Advised diet and exercise.    3. Morbid obesity with BMI " of 45.0-49.9, adult (HCC)  - Patient identified as having weight management issue.  Appropriate orders and counseling given.      Followup: Return in about 6 months (around 2/21/2019) for Diabetes.

## 2018-08-21 NOTE — ASSESSMENT & PLAN NOTE
At her last visit we started Actos 30 mg daily in addition to his glipizide 5 mg twice daily, Lantus 44 units daily, metformin 1000 mg twice daily.  A1c improved from 10.6 down to 6.9.  He denies any changes in his diet.  His lifestyle has includes less activity because of a left foot fracture 4 months ago.

## 2018-08-23 LAB — RETINAL SCREEN: NEGATIVE

## 2018-08-24 ENCOUNTER — TELEPHONE (OUTPATIENT)
Dept: MEDICAL GROUP | Facility: MEDICAL CENTER | Age: 63
End: 2018-08-24

## 2018-08-24 NOTE — TELEPHONE ENCOUNTER
----- Message from Marvin Barreto M.D. sent at 8/23/2018  6:40 PM PDT -----  Please notify patient that retinal eye exam shows no diabetic changes.  Patient should have an eye exam done every year to check for diabetic changes.  Marvin Barreto M.D.

## 2018-08-27 RX ORDER — PRAVASTATIN SODIUM 80 MG/1
TABLET ORAL
Qty: 90 TAB | Refills: 3 | Status: SHIPPED | OUTPATIENT
Start: 2018-08-27 | End: 2019-10-27 | Stop reason: SDUPTHER

## 2018-08-30 ENCOUNTER — OFFICE VISIT (OUTPATIENT)
Dept: URGENT CARE | Facility: PHYSICIAN GROUP | Age: 63
End: 2018-08-30
Payer: COMMERCIAL

## 2018-08-30 VITALS
WEIGHT: 295.2 LBS | HEART RATE: 78 BPM | RESPIRATION RATE: 16 BRPM | DIASTOLIC BLOOD PRESSURE: 72 MMHG | OXYGEN SATURATION: 92 % | BODY MASS INDEX: 47.44 KG/M2 | SYSTOLIC BLOOD PRESSURE: 102 MMHG | HEIGHT: 66 IN | TEMPERATURE: 96.7 F

## 2018-08-30 DIAGNOSIS — J06.9 UPPER RESPIRATORY INFECTION, ACUTE: Primary | ICD-10-CM

## 2018-08-30 DIAGNOSIS — J02.9 SORE THROAT: ICD-10-CM

## 2018-08-30 LAB
INT CON NEG: NORMAL
INT CON POS: NORMAL
S PYO AG THROAT QL: NEGATIVE

## 2018-08-30 PROCEDURE — 99214 OFFICE O/P EST MOD 30 MIN: CPT | Performed by: PHYSICIAN ASSISTANT

## 2018-08-30 PROCEDURE — 87880 STREP A ASSAY W/OPTIC: CPT | Performed by: PHYSICIAN ASSISTANT

## 2018-08-30 RX ORDER — AZITHROMYCIN 250 MG/1
TABLET, FILM COATED ORAL
Qty: 1 QUANTITY SUFFICIENT | Refills: 0 | Status: SHIPPED | OUTPATIENT
Start: 2018-08-30 | End: 2018-12-20

## 2018-08-30 RX ORDER — CODEINE PHOSPHATE/GUAIFENESIN 10-100MG/5
10 LIQUID (ML) ORAL 2 TIMES DAILY
Qty: 100 ML | Refills: 0 | Status: SHIPPED | OUTPATIENT
Start: 2018-08-30 | End: 2018-08-30 | Stop reason: SDUPTHER

## 2018-08-30 RX ORDER — CODEINE PHOSPHATE/GUAIFENESIN 10-100MG/5
10 LIQUID (ML) ORAL 2 TIMES DAILY
Qty: 100 ML | Refills: 0 | Status: SHIPPED | OUTPATIENT
Start: 2018-08-30 | End: 2018-09-04

## 2018-08-30 ASSESSMENT — ENCOUNTER SYMPTOMS
COUGH: 1
BLOOD IN STOOL: 0
CHILLS: 0
NAUSEA: 0
CONSTIPATION: 0
SORE THROAT: 1
SPUTUM PRODUCTION: 1
FEVER: 0
SHORTNESS OF BREATH: 0
MYALGIAS: 0
RHINORRHEA: 1
DIARRHEA: 0
VOMITING: 0
ABDOMINAL PAIN: 0
SINUS PAIN: 1
WHEEZING: 0

## 2018-08-30 ASSESSMENT — COPD QUESTIONNAIRES: COPD: 0

## 2018-08-30 NOTE — PROGRESS NOTES
Subjective:   Jethro Guillen is a 62 y.o. male who presents for Cough (Sore throat, runny nose, sinus pressure, headache, x3 days)    Cough   This is a new problem. Episode onset: 3 day. Associated symptoms include ear congestion, nasal congestion, rhinorrhea and a sore throat. Pertinent negatives include no chills, ear pain, fever, myalgias, shortness of breath or wheezing. Treatments tried: Musinex, Nyquil  The treatment provided mild relief. There is no history of asthma, bronchitis, COPD, emphysema, environmental allergies or pneumonia.     Denies strep, mono or flu exposure.     Review of Systems   Constitutional: Negative for chills, fever and malaise/fatigue.   HENT: Positive for congestion, rhinorrhea, sinus pain and sore throat. Negative for ear pain.    Respiratory: Positive for cough and sputum production. Negative for shortness of breath and wheezing.    Gastrointestinal: Negative for abdominal pain, blood in stool, constipation, diarrhea, nausea and vomiting.   Musculoskeletal: Negative for myalgias.   Endo/Heme/Allergies: Negative for environmental allergies.   All other systems reviewed and are negative.      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 He also has no past medical history of Asthma or COPD (chronic obstructive pulmonary disease) ([facility]).  MEDS:   Current Outpatient Prescriptions:   •  azithromycin (ZITHROMAX) 250 MG Tab, Take two tabs po day one followed by one tab po day two through five with food, Disp: 1 Quantity Sufficient, Rfl: 0  •  guaifenesin-codeine (TUSSI-ORGANIDIN NR) 100-10 MG/5ML syrup, Take 10 mL by mouth 2 times a day for 5 days., Disp: 100 mL, Rfl: 0  •  pravastatin (PRAVACHOL) 80 MG tablet, TAKE ONE TABLET BY MOUTH NIGHTLY AT BEDTIME, Disp: 90 Tab, Rfl: 3  •   warfarin (COUMADIN) 5 MG Tab, TAKE 2 TABS BY MOUTH EVERY DAY., Disp: 180 Tab, Rfl: 3  •  metformin (GLUCOPHAGE) 1000 MG tablet, TAKE 1 TABLET BY MOUTH TWICE A DAY WITH MEALS, Disp: 180 Tab, Rfl: 3  •  spironolactone (ALDACTONE) 50 MG Tab, TAKE 1 TABLET BY MOUTH TWICE A DAY, Disp: 180 Tab, Rfl: 3  •  glipiZIDE (GLUCOTROL) 5 MG Tab, Take 1 Tab by mouth 2 times a day., Disp: 180 Tab, Rfl: 3  •  pioglitazone (ACTOS) 30 MG Tab, Take 1 Tab by mouth every day., Disp: 90 Tab, Rfl: 3  •  metoprolol (LOPRESSOR) 25 MG Tab, TAKE 1 TABLET BY MOUTH 2 TIMES A DAY., Disp: 180 Tab, Rfl: 3  •  estradiol (ESTRACE) 2 MG Tab, TAKE 2 & 1/2 TABLETS BY MOUTH EVERY DAY, Disp: 225 Tab, Rfl: 3  •  lisinopril (PRINIVIL) 5 MG Tab, TAKE ONE TABLET BY MOUTH ONE TIME DAILY, Disp: 90 Tab, Rfl: 3  •  LANTUS SOLOSTAR 100 UNIT/ML Solution Pen-injector injection, INJECT 44 UNITS SUBCUTANEOUSLY EVERY EVENING AS DIRECTED BY PHYSICIAN, Disp: 45 PEN, Rfl: 2  •  aspirin (ASA) 325 MG Tab, Take  by mouth., Disp: , Rfl:   •  neomycin-polymyxin-HC (PEDIOTIC HC) 3.5-56966-8 Suspension, Place 8 Drops in right ear., Disp: , Rfl:   •  ofloxacin otic sol (FLOXIN OTIC) 0.3 % Solution, Place 5 Drops in right ear., Disp: , Rfl:   •  omeprazole (PRILOSEC) 20 MG delayed-release capsule, Take  by mouth., Disp: , Rfl:   •  fluticasone (FLONASE) 50 MCG/ACT nasal spray, Spray 2 Sprays in nose every day., Disp: 16 g, Rfl: 3  •  docosahexanoic acid (FISH OIL) 1000 MG CAPS, Take 1 Cap by mouth 2 times a day., Disp: 60 Cap, Rfl: 6  •  fenofibrate (TRICOR) 54 MG tablet, Take  by mouth., Disp: , Rfl:   ALLERGIES:   Allergies   Allergen Reactions   • Nkda [No Known Drug Allergy]      SURGHX:   Past Surgical History:   Procedure Laterality Date   • EAR MIDDLE EXPLORATION     • PB ESOPHAGOSCOPY,DILATION,<30MM       SOCHX:  reports that he has never smoked. He has never used smokeless tobacco. He reports that he does not drink alcohol or use drugs.  Family History   Problem Relation  "Age of Onset   • Hyperlipidemia Mother    • Lung Disease Brother    • Heart Disease Brother         Objective:   /72   Pulse 78   Temp 35.9 °C (96.7 °F)   Resp 16   Ht 1.676 m (5' 5.98\")   Wt (!) 133.9 kg (295 lb 3.2 oz)   SpO2 92%   BMI 47.68 kg/m²     Physical Exam   Constitutional: He is oriented to person, place, and time. He appears well-developed and well-nourished. No distress.   HENT:   Head: Normocephalic and atraumatic.   Nose: Mucosal edema and rhinorrhea present. No sinus tenderness. Right sinus exhibits no maxillary sinus tenderness and no frontal sinus tenderness. Left sinus exhibits no maxillary sinus tenderness and no frontal sinus tenderness.   Mouth/Throat: Posterior oropharyngeal edema and posterior oropharyngeal erythema present. No oropharyngeal exudate.   bilateral tympanostomy tubes notes, scarring on TMs no obvious effusion. Canal normal.    Eyes: Pupils are equal, round, and reactive to light. Conjunctivae are normal.   Neck: Normal range of motion. Neck supple.   Cardiovascular: Normal rate and regular rhythm.    Pulmonary/Chest: Effort normal and breath sounds normal. No respiratory distress. He has no wheezes. He has no rales.   Lymphadenopathy:     He has cervical adenopathy.   Neurological: He is alert and oriented to person, place, and time.   Skin: Skin is warm and dry.   Psychiatric: He has a normal mood and affect. His behavior is normal.   Vitals reviewed.    POCT rapid strep: Neg       Assessment/Plan:     1. Upper respiratory infection, acute  azithromycin (ZITHROMAX) 250 MG Tab    guaifenesin-codeine (TUSSI-ORGANIDIN NR) 100-10 MG/5ML syrup   2. Sore throat  POCT Rapid Strep A       Patient was given a contingent antibiotic prescription to fill and use as directed if symptoms progressed as discussed and agreed upon. We discussed possible interaction between warfarin and z-pack. Pt confirms understand requesting z pack. He has taken previously without AE. "     Supportive measures include: Alegra-D, humidifier, Flonase and salt water gargles. Follow-up with primary care provider within 7-10 days, emergency room precautions discussed.  Patient appears understanding of information.

## 2018-08-30 NOTE — PATIENT INSTRUCTIONS
"Allegra D or Zyrtec- D every morning   Robitussin at night   Continue supportive measure including: salt water gargles and humidifier    Fill Z-pack if sx are not improving or persist.         Upper Respiratory Infection, Adult  Most upper respiratory infections (URIs) are caused by a virus. A URI affects the nose, throat, and upper air passages. The most common type of URI is often called \"the common cold.\"  Follow these instructions at home:  · Take medicines only as told by your doctor.  · Gargle warm saltwater or take cough drops to comfort your throat as told by your doctor.  · Use a warm mist humidifier or inhale steam from a shower to increase air moisture. This may make it easier to breathe.  · Drink enough fluid to keep your pee (urine) clear or pale yellow.  · Eat soups and other clear broths.  · Have a healthy diet.  · Rest as needed.  · Go back to work when your fever is gone or your doctor says it is okay.  ¨ You may need to stay home longer to avoid giving your URI to others.  ¨ You can also wear a face mask and wash your hands often to prevent spread of the virus.  · Use your inhaler more if you have asthma.  · Do not use any tobacco products, including cigarettes, chewing tobacco, or electronic cigarettes. If you need help quitting, ask your doctor.  Contact a doctor if:  · You are getting worse, not better.  · Your symptoms are not helped by medicine.  · You have chills.  · You are getting more short of breath.  · You have brown or red mucus.  · You have yellow or brown discharge from your nose.  · You have pain in your face, especially when you bend forward.  · You have a fever.  · You have puffy (swollen) neck glands.  · You have pain while swallowing.  · You have white areas in the back of your throat.  Get help right away if:  · You have very bad or constant:  ¨ Headache.  ¨ Ear pain.  ¨ Pain in your forehead, behind your eyes, and over your cheekbones (sinus pain).  ¨ Chest pain.  · You have " long-lasting (chronic) lung disease and any of the following:  ¨ Wheezing.  ¨ Long-lasting cough.  ¨ Coughing up blood.  ¨ A change in your usual mucus.  · You have a stiff neck.  · You have changes in your:  ¨ Vision.  ¨ Hearing.  ¨ Thinking.  ¨ Mood.  This information is not intended to replace advice given to you by your health care provider. Make sure you discuss any questions you have with your health care provider.  Document Released: 06/05/2009 Document Revised: 08/20/2017 Document Reviewed: 03/25/2015  [employer] Interactive Patient Education © 2017 [employer]

## 2018-09-04 NOTE — PROGRESS NOTES
1. Attempt #:Final    2. WebIZ Checked & Epic Updated: Yes  3. HealthConnect Verified: yes  4. Verify PCP: yes    5. Communication Preference Obtained: yes    6. Diabetes Visit Scheduling  Scheduling Status:Scheduled/already scheduled      7. Care Gap Scheduling (Attempt to Schedule EACH Overdue Care Gap!)    Health Maintenance Due   Topic Date Due   • IMM HEP B VACCINE (1 of 3 - Risk 3-dose series) 10/04/1972   • COLONOSCOPY  10/04/2003   • IMM ZOSTER VACCINES (1 of 2) 10/04/2003   • DIABETES MONOFILAMENT / LE EXAM  05/06/2016   • IMM INFLUENZA (1) 09/01/2018        8. Patient was directed to Health and Wellness Website: yes     - Patient plans to schedule appointment for Colonoscopy/FIT.    9. Screened for [employer] Prescription? yes  10. [employer] Activation: already active  11. [employer] Susanna: no  12. Virtual Visits: no  13. Opt In to Text Messages: yes

## 2018-09-20 ENCOUNTER — PATIENT OUTREACH (OUTPATIENT)
Dept: HEALTH INFORMATION MANAGEMENT | Facility: OTHER | Age: 63
End: 2018-09-20

## 2018-09-24 ENCOUNTER — TELEPHONE (OUTPATIENT)
Dept: MEDICAL GROUP | Facility: MEDICAL CENTER | Age: 63
End: 2018-09-24

## 2018-09-24 NOTE — TELEPHONE ENCOUNTER
----- Message from Marvin Barreto M.D. sent at 9/24/2018  8:54 AM PDT -----  INR is in therapeutic range. Continue current dose of Coumadin. Recheck INR in 1 month.  Marvin Barreto MD

## 2018-10-04 RX ORDER — INSULIN GLARGINE 100 [IU]/ML
INJECTION, SOLUTION SUBCUTANEOUS
Qty: 45 PEN | Refills: 3 | Status: SHIPPED | OUTPATIENT
Start: 2018-10-04 | End: 2019-11-25 | Stop reason: SDUPTHER

## 2018-11-12 RX ORDER — ESTRADIOL 2 MG/1
TABLET ORAL
Qty: 225 TAB | Refills: 3 | Status: SHIPPED | OUTPATIENT
Start: 2018-11-12 | End: 2019-01-29

## 2018-11-28 RX ORDER — LISINOPRIL 5 MG/1
TABLET ORAL
Qty: 90 TAB | Refills: 3 | Status: SHIPPED | OUTPATIENT
Start: 2018-11-28 | End: 2020-01-29 | Stop reason: SDUPTHER

## 2018-12-14 ENCOUNTER — HOSPITAL ENCOUNTER (OUTPATIENT)
Dept: LAB | Facility: MEDICAL CENTER | Age: 63
End: 2018-12-14
Attending: FAMILY MEDICINE
Payer: COMMERCIAL

## 2018-12-14 DIAGNOSIS — Z79.4 CONTROLLED TYPE 2 DIABETES MELLITUS WITHOUT COMPLICATION, WITH LONG-TERM CURRENT USE OF INSULIN (HCC): ICD-10-CM

## 2018-12-14 DIAGNOSIS — E11.9 CONTROLLED TYPE 2 DIABETES MELLITUS WITHOUT COMPLICATION, WITH LONG-TERM CURRENT USE OF INSULIN (HCC): ICD-10-CM

## 2018-12-14 LAB
CREAT UR-MCNC: 179 MG/DL
MICROALBUMIN UR-MCNC: 1.3 MG/DL
MICROALBUMIN/CREAT UR: 7 MG/G (ref 0–30)

## 2018-12-14 PROCEDURE — 82570 ASSAY OF URINE CREATININE: CPT

## 2018-12-14 PROCEDURE — 80061 LIPID PANEL: CPT

## 2018-12-14 PROCEDURE — 36415 COLL VENOUS BLD VENIPUNCTURE: CPT

## 2018-12-14 PROCEDURE — 82043 UR ALBUMIN QUANTITATIVE: CPT

## 2018-12-14 PROCEDURE — 83036 HEMOGLOBIN GLYCOSYLATED A1C: CPT

## 2018-12-14 PROCEDURE — 80053 COMPREHEN METABOLIC PANEL: CPT

## 2018-12-15 LAB
ALBUMIN SERPL BCP-MCNC: 4 G/DL (ref 3.2–4.9)
ALBUMIN/GLOB SERPL: 1.1 G/DL
ALP SERPL-CCNC: 56 U/L (ref 30–99)
ALT SERPL-CCNC: 41 U/L (ref 2–50)
ANION GAP SERPL CALC-SCNC: 7 MMOL/L (ref 0–11.9)
AST SERPL-CCNC: 43 U/L (ref 12–45)
BILIRUB SERPL-MCNC: 0.7 MG/DL (ref 0.1–1.5)
BUN SERPL-MCNC: 17 MG/DL (ref 8–22)
CALCIUM SERPL-MCNC: 9.3 MG/DL (ref 8.5–10.5)
CHLORIDE SERPL-SCNC: 107 MMOL/L (ref 96–112)
CHOLEST SERPL-MCNC: 197 MG/DL (ref 100–199)
CO2 SERPL-SCNC: 21 MMOL/L (ref 20–33)
CREAT SERPL-MCNC: 1.05 MG/DL (ref 0.5–1.4)
EST. AVERAGE GLUCOSE BLD GHB EST-MCNC: 146 MG/DL
FASTING STATUS PATIENT QL REPORTED: NORMAL
GLOBULIN SER CALC-MCNC: 3.6 G/DL (ref 1.9–3.5)
GLUCOSE SERPL-MCNC: 120 MG/DL (ref 65–99)
HBA1C MFR BLD: 6.7 % (ref 0–5.6)
HDLC SERPL-MCNC: 54 MG/DL
LDLC SERPL CALC-MCNC: 108 MG/DL
POTASSIUM SERPL-SCNC: 4.3 MMOL/L (ref 3.6–5.5)
PROT SERPL-MCNC: 7.6 G/DL (ref 6–8.2)
SODIUM SERPL-SCNC: 135 MMOL/L (ref 135–145)
TRIGL SERPL-MCNC: 176 MG/DL (ref 0–149)

## 2018-12-20 ENCOUNTER — OFFICE VISIT (OUTPATIENT)
Dept: MEDICAL GROUP | Facility: MEDICAL CENTER | Age: 63
End: 2018-12-20
Payer: COMMERCIAL

## 2018-12-20 VITALS
HEART RATE: 98 BPM | HEIGHT: 66 IN | SYSTOLIC BLOOD PRESSURE: 120 MMHG | BODY MASS INDEX: 48.37 KG/M2 | OXYGEN SATURATION: 94 % | WEIGHT: 301 LBS | DIASTOLIC BLOOD PRESSURE: 76 MMHG | TEMPERATURE: 96.5 F

## 2018-12-20 DIAGNOSIS — Z12.5 PROSTATE CANCER SCREENING: ICD-10-CM

## 2018-12-20 DIAGNOSIS — E11.9 CONTROLLED TYPE 2 DIABETES MELLITUS WITHOUT COMPLICATION, WITH LONG-TERM CURRENT USE OF INSULIN (HCC): ICD-10-CM

## 2018-12-20 DIAGNOSIS — Z23 NEED FOR VACCINATION: ICD-10-CM

## 2018-12-20 DIAGNOSIS — E78.2 MIXED HYPERLIPIDEMIA: ICD-10-CM

## 2018-12-20 DIAGNOSIS — Z79.4 CONTROLLED TYPE 2 DIABETES MELLITUS WITHOUT COMPLICATION, WITH LONG-TERM CURRENT USE OF INSULIN (HCC): ICD-10-CM

## 2018-12-20 DIAGNOSIS — E66.01 MORBID OBESITY WITH BMI OF 45.0-49.9, ADULT (HCC): ICD-10-CM

## 2018-12-20 PROCEDURE — 90670 PCV13 VACCINE IM: CPT | Performed by: FAMILY MEDICINE

## 2018-12-20 PROCEDURE — 99214 OFFICE O/P EST MOD 30 MIN: CPT | Mod: 25 | Performed by: FAMILY MEDICINE

## 2018-12-20 PROCEDURE — 90471 IMMUNIZATION ADMIN: CPT | Performed by: FAMILY MEDICINE

## 2018-12-20 ASSESSMENT — PATIENT HEALTH QUESTIONNAIRE - PHQ9: CLINICAL INTERPRETATION OF PHQ2 SCORE: 0

## 2018-12-20 NOTE — PROGRESS NOTES
Subjective:   Jethro Guillen is a 63 y.o. male here today for diabetes    Controlled type 2 diabetes mellitus without complication, with long-term current use of insulin (HCC)  Tolerating metformin 1000 mg twice daily, glipizide 5 mg twice daily, lantus 44 units once daily, actos 30 mg daily.  A1c of 6.7.  No hypoglycemia, no neuropathy.    Hyperlipidemia  Tolerating pravastatin 80 mg and fenofibrates 54 mg.  Eating out more and decreased activity.    Morbid obesity with BMI of 45.0-49.9, adult  Weight gain of 6 pounds in the last 4 months, decreased activity and more eating out.         Current medicines (including changes today)  Current Outpatient Prescriptions   Medication Sig Dispense Refill   • lisinopril (PRINIVIL) 5 MG Tab TAKE 1 TABLET BY MOUTH EVERY DAY 90 Tab 3   • estradiol (ESTRACE) 2 MG Tab TAKE 2 & 1/2 TABLETS BY MOUTH EVERY  Tab 3   • LANTUS SOLOSTAR 100 UNIT/ML Solution Pen-injector injection INJECT 44 UNITS SUBCUTANEOUSLY EVERY EVENING AS DIRECTED BY PHYSICIAN 45 PEN 3   • pravastatin (PRAVACHOL) 80 MG tablet TAKE ONE TABLET BY MOUTH NIGHTLY AT BEDTIME 90 Tab 3   • warfarin (COUMADIN) 5 MG Tab TAKE 2 TABS BY MOUTH EVERY DAY. 180 Tab 3   • metformin (GLUCOPHAGE) 1000 MG tablet TAKE 1 TABLET BY MOUTH TWICE A DAY WITH MEALS 180 Tab 3   • spironolactone (ALDACTONE) 50 MG Tab TAKE 1 TABLET BY MOUTH TWICE A  Tab 3   • glipiZIDE (GLUCOTROL) 5 MG Tab Take 1 Tab by mouth 2 times a day. 180 Tab 3   • pioglitazone (ACTOS) 30 MG Tab Take 1 Tab by mouth every day. 90 Tab 3   • metoprolol (LOPRESSOR) 25 MG Tab TAKE 1 TABLET BY MOUTH 2 TIMES A DAY. 180 Tab 3   • aspirin (ASA) 325 MG Tab Take  by mouth.     • fenofibrate (TRICOR) 54 MG tablet Take  by mouth.     • neomycin-polymyxin-HC (PEDIOTIC HC) 3.5-71731-2 Suspension Place 8 Drops in right ear.     • ofloxacin otic sol (FLOXIN OTIC) 0.3 % Solution Place 5 Drops in right ear.     • omeprazole (PRILOSEC) 20 MG delayed-release capsule Take  by mouth.    "  • fluticasone (FLONASE) 50 MCG/ACT nasal spray Spray 2 Sprays in nose every day. 16 g 3   • docosahexanoic acid (FISH OIL) 1000 MG CAPS Take 1 Cap by mouth 2 times a day. 60 Cap 6     No current facility-administered medications for this visit.      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 He also has no past medical history of Asthma or COPD (chronic obstructive pulmonary disease) ([facility]).    ROS   No chest pain, no shortness of breath       Objective:     Blood pressure 120/76, pulse 98, temperature 35.8 °C (96.5 °F), height 1.676 m (5' 6\"), weight (!) 136.5 kg (301 lb), SpO2 94 %. Body mass index is 48.58 kg/m².   Physical Exam:  Constitutional: Alert, no distress.  Skin: Warm, dry, good turgor, no rashes in visible areas.  Eye: Equal, round and reactive, conjunctiva clear, lids normal.  ENMT: Lips without lesions, good dentition, oropharynx clear.  Psych: Alert and oriented x3, normal affect and mood.    Monofilament testing with a 10 gram force: sensation intact: intact bilaterally  Visual Inspection: Feet without maceration, ulcers, fissures.  Pedal pulses: intact bilaterally        Assessment and Plan:   The following treatment plan was discussed    1. Controlled type 2 diabetes mellitus without complication, with long-term current use of insulin ([facility])  Controlled.  Continue current medication regimen.  Follow-up with labs in 6 months.  Advised diet and exercise.  - COMP METABOLIC PANEL; Future  - HEMOGLOBIN A1C; Future  - MICROALBUMIN CREAT RATIO URINE; Future  - Lipid Profile; Future  - CBC WITH DIFFERENTIAL; Future  - Diabetic Monofilament Lower Extremity Exam    2. Mixed hyperlipidemia  Elevated LDL.  Continue pravastatin.  Advised diet and exercise.    3. Morbid obesity with BMI of " 45.0-49.9, adult (HCC)  Advised diet and exercise.  Follow-up in 6 months.    4. Prostate cancer screening  - PROSTATE SPECIFIC AG SCREENING; Future    5. Need for vaccination  - Pneumococcal Conjugate Vaccine 13-Valent      Followup: Return in about 6 months (around 6/20/2019) for Diabetes.

## 2018-12-20 NOTE — ASSESSMENT & PLAN NOTE
Tolerating metformin 1000 mg twice daily, glipizide 5 mg twice daily, lantus 44 units once daily, actos 30 mg daily.  A1c of 6.7.  No hypoglycemia, no neuropathy.

## 2018-12-27 ENCOUNTER — TELEPHONE (OUTPATIENT)
Dept: MEDICAL GROUP | Facility: MEDICAL CENTER | Age: 63
End: 2018-12-27

## 2018-12-27 DIAGNOSIS — I48.20 CHRONIC ATRIAL FIBRILLATION (HCC): ICD-10-CM

## 2018-12-27 NOTE — TELEPHONE ENCOUNTER
Please notify patient that her INR is 1.9, slightly low as the goal is between 2 and 3.  Please ask what dose patient is taking daily.  Marvin Barreto M.D.

## 2019-01-29 DIAGNOSIS — I48.20 CHRONIC ATRIAL FIBRILLATION (HCC): ICD-10-CM

## 2019-01-29 RX ORDER — WARFARIN SODIUM 1 MG/1
2 TABLET ORAL DAILY
Qty: 180 TAB | Refills: 3 | Status: SHIPPED | OUTPATIENT
Start: 2019-01-29 | End: 2019-04-08 | Stop reason: SDUPTHER

## 2019-01-29 RX ORDER — ESTRADIOL 2 MG/1
TABLET ORAL
Qty: 225 TAB | Refills: 3 | Status: SHIPPED | OUTPATIENT
Start: 2019-01-29 | End: 2019-04-16 | Stop reason: SDUPTHER

## 2019-02-01 ENCOUNTER — TELEPHONE (OUTPATIENT)
Dept: MEDICAL GROUP | Facility: MEDICAL CENTER | Age: 64
End: 2019-02-01

## 2019-02-01 NOTE — TELEPHONE ENCOUNTER
----- Message from Marvin Barreto M.D. sent at 2/1/2019  7:18 AM PST -----  INR is in therapeutic range. Continue current dose of Coumadin. Recheck INR in 1 month.  Marvin Barreto MD

## 2019-04-01 ENCOUNTER — TELEPHONE (OUTPATIENT)
Dept: MEDICAL GROUP | Facility: MEDICAL CENTER | Age: 64
End: 2019-04-01

## 2019-04-01 DIAGNOSIS — I48.20 CHRONIC ATRIAL FIBRILLATION (HCC): ICD-10-CM

## 2019-04-01 NOTE — TELEPHONE ENCOUNTER
Please notify patient that her INR is slightly low at 1.8.  Please confirm that she is taking her Coumadin regularly.  Please ask what dosing she is using because we may need to increase her dose.  Marvin Barreto M.D.

## 2019-04-08 RX ORDER — WARFARIN SODIUM 1 MG/1
3 TABLET ORAL DAILY
Qty: 270 TAB | Refills: 3 | Status: SHIPPED | OUTPATIENT
Start: 2019-04-08 | End: 2019-07-03 | Stop reason: SDUPTHER

## 2019-04-08 NOTE — TELEPHONE ENCOUNTER
Please have patient increase her Coumadin to 13 mg daily and recheck INR in 1 week.  Marvin Barreto M.D.

## 2019-04-16 RX ORDER — ESTRADIOL 2 MG/1
TABLET ORAL
Qty: 225 TAB | Refills: 3 | Status: SHIPPED | OUTPATIENT
Start: 2019-04-16 | End: 2020-01-29 | Stop reason: SDUPTHER

## 2019-04-27 DIAGNOSIS — I10 ESSENTIAL HYPERTENSION: ICD-10-CM

## 2019-04-29 RX ORDER — PIOGLITAZONEHYDROCHLORIDE 30 MG/1
30 TABLET ORAL DAILY
Qty: 90 TAB | Refills: 3 | Status: SHIPPED | OUTPATIENT
Start: 2019-04-29 | End: 2020-05-27 | Stop reason: SDUPTHER

## 2019-05-09 ENCOUNTER — TELEPHONE (OUTPATIENT)
Dept: MEDICAL GROUP | Facility: MEDICAL CENTER | Age: 64
End: 2019-05-09

## 2019-05-09 NOTE — TELEPHONE ENCOUNTER
----- Message from Marvin Barreto M.D. sent at 5/9/2019  7:39 AM PDT -----  INR is in therapeutic range. Continue current dose of Coumadin. Recheck INR in 1 month.  Marvin Barreto MD

## 2019-05-09 NOTE — LETTER
May 17, 2019        Jethro Guillen  [address] [place] [address]        Dear Jethro,      After several attempts, Marvin Barreto M.D.'s office has been unable to reach you by phone regarding your recent test results.-INR results are in therapeutic range. Repeat test in 1 month,     We ask that you contact us at [phone] at your earliest convenience. Our office hours are from 8:00a - 5:00p Monday thru Friday.    Kind regards,   Brandon Prater Ass't          Electronically Signed

## 2019-05-22 RX ORDER — GLIPIZIDE 5 MG/1
5 TABLET ORAL 2 TIMES DAILY
Qty: 180 TAB | Refills: 3 | Status: SHIPPED | OUTPATIENT
Start: 2019-05-22 | End: 2020-02-13

## 2019-06-18 ENCOUNTER — HOSPITAL ENCOUNTER (OUTPATIENT)
Dept: LAB | Facility: MEDICAL CENTER | Age: 64
End: 2019-06-18
Attending: FAMILY MEDICINE
Payer: COMMERCIAL

## 2019-06-18 DIAGNOSIS — Z12.5 PROSTATE CANCER SCREENING: ICD-10-CM

## 2019-06-18 DIAGNOSIS — E11.9 CONTROLLED TYPE 2 DIABETES MELLITUS WITHOUT COMPLICATION, WITH LONG-TERM CURRENT USE OF INSULIN (HCC): ICD-10-CM

## 2019-06-18 DIAGNOSIS — Z79.4 CONTROLLED TYPE 2 DIABETES MELLITUS WITHOUT COMPLICATION, WITH LONG-TERM CURRENT USE OF INSULIN (HCC): ICD-10-CM

## 2019-06-18 PROCEDURE — 84153 ASSAY OF PSA TOTAL: CPT

## 2019-06-18 PROCEDURE — 82043 UR ALBUMIN QUANTITATIVE: CPT

## 2019-06-18 PROCEDURE — 83036 HEMOGLOBIN GLYCOSYLATED A1C: CPT

## 2019-06-18 PROCEDURE — 36415 COLL VENOUS BLD VENIPUNCTURE: CPT

## 2019-06-18 PROCEDURE — 85025 COMPLETE CBC W/AUTO DIFF WBC: CPT

## 2019-06-18 PROCEDURE — 80053 COMPREHEN METABOLIC PANEL: CPT

## 2019-06-18 PROCEDURE — 82570 ASSAY OF URINE CREATININE: CPT

## 2019-06-18 PROCEDURE — 80061 LIPID PANEL: CPT

## 2019-06-19 LAB
ALBUMIN SERPL BCP-MCNC: 3.8 G/DL (ref 3.2–4.9)
ALBUMIN/GLOB SERPL: 1.1 G/DL
ALP SERPL-CCNC: 56 U/L (ref 30–99)
ALT SERPL-CCNC: 30 U/L (ref 2–50)
ANION GAP SERPL CALC-SCNC: 9 MMOL/L (ref 0–11.9)
AST SERPL-CCNC: 33 U/L (ref 12–45)
BASOPHILS # BLD AUTO: 0.6 % (ref 0–1.8)
BASOPHILS # BLD: 0.04 K/UL (ref 0–0.12)
BILIRUB SERPL-MCNC: 0.6 MG/DL (ref 0.1–1.5)
BUN SERPL-MCNC: 15 MG/DL (ref 8–22)
CALCIUM SERPL-MCNC: 9.2 MG/DL (ref 8.5–10.5)
CHLORIDE SERPL-SCNC: 109 MMOL/L (ref 96–112)
CHOLEST SERPL-MCNC: 182 MG/DL (ref 100–199)
CO2 SERPL-SCNC: 21 MMOL/L (ref 20–33)
CREAT SERPL-MCNC: 1.08 MG/DL (ref 0.5–1.4)
CREAT UR-MCNC: 145 MG/DL
EOSINOPHIL # BLD AUTO: 0.19 K/UL (ref 0–0.51)
EOSINOPHIL NFR BLD: 3 % (ref 0–6.9)
ERYTHROCYTE [DISTWIDTH] IN BLOOD BY AUTOMATED COUNT: 52.2 FL (ref 35.9–50)
EST. AVERAGE GLUCOSE BLD GHB EST-MCNC: 154 MG/DL
FASTING STATUS PATIENT QL REPORTED: NORMAL
GLOBULIN SER CALC-MCNC: 3.6 G/DL (ref 1.9–3.5)
GLUCOSE SERPL-MCNC: 115 MG/DL (ref 65–99)
HBA1C MFR BLD: 7 % (ref 0–5.6)
HCT VFR BLD AUTO: 45.6 % (ref 42–52)
HDLC SERPL-MCNC: 50 MG/DL
HGB BLD-MCNC: 14.8 G/DL (ref 14–18)
IMM GRANULOCYTES # BLD AUTO: 0.01 K/UL (ref 0–0.11)
IMM GRANULOCYTES NFR BLD AUTO: 0.2 % (ref 0–0.9)
LDLC SERPL CALC-MCNC: 99 MG/DL
LYMPHOCYTES # BLD AUTO: 2.55 K/UL (ref 1–4.8)
LYMPHOCYTES NFR BLD: 40.7 % (ref 22–41)
MCH RBC QN AUTO: 31 PG (ref 27–33)
MCHC RBC AUTO-ENTMCNC: 32.5 G/DL (ref 33.7–35.3)
MCV RBC AUTO: 95.6 FL (ref 81.4–97.8)
MICROALBUMIN UR-MCNC: <0.7 MG/DL
MICROALBUMIN/CREAT UR: NORMAL MG/G (ref 0–30)
MONOCYTES # BLD AUTO: 0.6 K/UL (ref 0–0.85)
MONOCYTES NFR BLD AUTO: 9.6 % (ref 0–13.4)
NEUTROPHILS # BLD AUTO: 2.88 K/UL (ref 1.82–7.42)
NEUTROPHILS NFR BLD: 45.9 % (ref 44–72)
NRBC # BLD AUTO: 0 K/UL
NRBC BLD-RTO: 0 /100 WBC
PLATELET # BLD AUTO: 160 K/UL (ref 164–446)
PMV BLD AUTO: 10.5 FL (ref 9–12.9)
POTASSIUM SERPL-SCNC: 4.3 MMOL/L (ref 3.6–5.5)
PROT SERPL-MCNC: 7.4 G/DL (ref 6–8.2)
PSA SERPL-MCNC: 0.17 NG/ML (ref 0–4)
RBC # BLD AUTO: 4.77 M/UL (ref 4.7–6.1)
SODIUM SERPL-SCNC: 139 MMOL/L (ref 135–145)
TRIGL SERPL-MCNC: 164 MG/DL (ref 0–149)
WBC # BLD AUTO: 6.3 K/UL (ref 4.8–10.8)

## 2019-06-20 ENCOUNTER — OFFICE VISIT (OUTPATIENT)
Dept: MEDICAL GROUP | Facility: MEDICAL CENTER | Age: 64
End: 2019-06-20
Payer: COMMERCIAL

## 2019-06-20 VITALS
BODY MASS INDEX: 49.34 KG/M2 | SYSTOLIC BLOOD PRESSURE: 128 MMHG | OXYGEN SATURATION: 94 % | TEMPERATURE: 97.7 F | WEIGHT: 307 LBS | DIASTOLIC BLOOD PRESSURE: 76 MMHG | HEART RATE: 78 BPM | HEIGHT: 66 IN

## 2019-06-20 DIAGNOSIS — H66.001 ACUTE SUPPURATIVE OTITIS MEDIA OF RIGHT EAR WITHOUT SPONTANEOUS RUPTURE OF TYMPANIC MEMBRANE, RECURRENCE NOT SPECIFIED: ICD-10-CM

## 2019-06-20 DIAGNOSIS — I10 ESSENTIAL HYPERTENSION: ICD-10-CM

## 2019-06-20 DIAGNOSIS — E78.2 MIXED HYPERLIPIDEMIA: ICD-10-CM

## 2019-06-20 DIAGNOSIS — Z79.4 CONTROLLED TYPE 2 DIABETES MELLITUS WITHOUT COMPLICATION, WITH LONG-TERM CURRENT USE OF INSULIN (HCC): ICD-10-CM

## 2019-06-20 DIAGNOSIS — E11.9 CONTROLLED TYPE 2 DIABETES MELLITUS WITHOUT COMPLICATION, WITH LONG-TERM CURRENT USE OF INSULIN (HCC): ICD-10-CM

## 2019-06-20 DIAGNOSIS — J01.00 ACUTE NON-RECURRENT MAXILLARY SINUSITIS: ICD-10-CM

## 2019-06-20 PROCEDURE — 99214 OFFICE O/P EST MOD 30 MIN: CPT | Performed by: FAMILY MEDICINE

## 2019-06-20 RX ORDER — AMOXICILLIN 875 MG/1
875 TABLET, COATED ORAL 2 TIMES DAILY
Qty: 20 TAB | Refills: 0 | Status: SHIPPED | OUTPATIENT
Start: 2019-06-20 | End: 2019-06-30

## 2019-06-20 RX ORDER — FLUTICASONE PROPIONATE 50 MCG
1 SPRAY, SUSPENSION (ML) NASAL 2 TIMES DAILY
Qty: 1 BOTTLE | Refills: 2 | Status: SHIPPED | OUTPATIENT
Start: 2019-06-20

## 2019-06-20 ASSESSMENT — PATIENT HEALTH QUESTIONNAIRE - PHQ9: CLINICAL INTERPRETATION OF PHQ2 SCORE: 0

## 2019-06-20 NOTE — PROGRESS NOTES
Subjective:   Jethro Guillen is a 63 y.o. male here today for diabetes and ear/sinus infection    Controlled type 2 diabetes mellitus without complication, with long-term current use of insulin (HCC)  Patient is currently taking glipizide 5 mg twice daily, metformin 1000 mg twice daily, Actos 30 mg daily and Lantus 44 units daily.  Her A1c is stable at 7.0.    HTN (hypertension)  Patient is tolerating lisinopril 5 mg daily, spironolactone 50 mg daily and metoprolol 25 mg twice daily    Hyperlipidemia  Patient is tolerating pravastatin 80 mg daily without any noticeable side effects.    OM (otitis media)  Patient has bilateral myringotomy and noticed that right ear was draining starting yesterday.  He has right ear pain as well as the drainage.       Patient is having increased nasal drainage that is causing a cough, plus sinus pressure and tenderness.    Current medicines (including changes today)  Current Outpatient Prescriptions   Medication Sig Dispense Refill   • amoxicillin (AMOXIL) 875 MG tablet Take 1 Tab by mouth 2 times a day for 10 days. 20 Tab 0   • fluticasone (FLONASE) 50 MCG/ACT nasal spray Spray 1 Spray in nose 2 times a day. 1 Bottle 2   • glipiZIDE (GLUCOTROL) 5 MG Tab TAKE 1 TAB BY MOUTH 2 TIMES A DAY. 180 Tab 3   • metoprolol (LOPRESSOR) 25 MG Tab TAKE 1 TABLET BY MOUTH 2 TIMES A DAY. 180 Tab 3   • pioglitazone (ACTOS) 30 MG Tab TAKE 1 TAB BY MOUTH EVERY DAY. 90 Tab 3   • estradiol (ESTRACE) 2 MG Tab TAKE 2 & 1/2 TABLETS BY MOUTH EVERY  Tab 3   • warfarin (COUMADIN) 1 MG Tab Take 3 Tabs by mouth every day. 270 Tab 3   • lisinopril (PRINIVIL) 5 MG Tab TAKE 1 TABLET BY MOUTH EVERY DAY 90 Tab 3   • LANTUS SOLOSTAR 100 UNIT/ML Solution Pen-injector injection INJECT 44 UNITS SUBCUTANEOUSLY EVERY EVENING AS DIRECTED BY PHYSICIAN 45 PEN 3   • pravastatin (PRAVACHOL) 80 MG tablet TAKE ONE TABLET BY MOUTH NIGHTLY AT BEDTIME 90 Tab 3   • metformin (GLUCOPHAGE) 1000 MG tablet TAKE 1 TABLET BY MOUTH TWICE  "A DAY WITH MEALS 180 Tab 3   • spironolactone (ALDACTONE) 50 MG Tab TAKE 1 TABLET BY MOUTH TWICE A  Tab 3   • aspirin (ASA) 325 MG Tab Take  by mouth.     • fenofibrate (TRICOR) 54 MG tablet Take  by mouth.     • neomycin-polymyxin-HC (PEDIOTIC HC) 3.5-70014-0 Suspension Place 8 Drops in right ear.     • ofloxacin otic sol (FLOXIN OTIC) 0.3 % Solution Place 5 Drops in right ear.     • omeprazole (PRILOSEC) 20 MG delayed-release capsule Take  by mouth.     • docosahexanoic acid (FISH OIL) 1000 MG CAPS Take 1 Cap by mouth 2 times a day. 60 Cap 6     No current facility-administered medications for this visit.      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 He also has no past medical history of Asthma or COPD (chronic obstructive pulmonary disease) ([facility]).    ROS   Positive weight gain, no shortness of breath       Objective:     /76 (BP Location: Right arm, Patient Position: Sitting)   Pulse 78   Temp 36.5 °C (97.7 °F)   Ht 1.676 m (5' 6\")   Wt (!) 139.3 kg (307 lb)   SpO2 94%  Body mass index is 49.55 kg/m².   Physical Exam:  Constitutional: Alert, no distress.  Skin: Warm, dry, good turgor, no rashes in visible areas.  Eye: Equal, round and reactive, conjunctiva clear, lids normal.  ENMT: Lips without lesions, good dentition, oropharynx clear.  Bilateral myringotomy tubes in place with right side purulent drainage.  Tenderness palpation bilateral maxillary sinuses.  Positive thin copious drainage bilateral nasal passages.  Respiratory: Unlabored respiratory effort, lungs clear to auscultation, no wheezes, no ronchi.  Psych: Alert and oriented x3, normal affect and mood.        Assessment and Plan:   The following treatment plan was discussed    1. Controlled type 2 diabetes mellitus " without complication, with long-term current use of insulin (HCC)  Controlled.  Continue current medications.  Follow-up in 6 months with labs.  Encourage patient to increase activity for weight loss.  - Comp Metabolic Panel; Future  - Lipid Profile; Future  - HEMOGLOBIN A1C; Future  - MICROALBUMIN CREAT RATIO URINE; Future    2. Mixed hyperlipidemia  Stable.  Continue current medication.  Follow-up in 6 months with labs.    3. Essential hypertension  Controlled.  Continue current medications.    4. Acute suppurative otitis media of right ear without spontaneous rupture of tympanic membrane, recurrence not specified  New problem.  Prescription for amoxicillin.  - amoxicillin (AMOXIL) 875 MG tablet; Take 1 Tab by mouth 2 times a day for 10 days.  Dispense: 20 Tab; Refill: 0    5. Acute non-recurrent maxillary sinusitis  New problem.  Prescription for amoxicillin and Flonase.  - amoxicillin (AMOXIL) 875 MG tablet; Take 1 Tab by mouth 2 times a day for 10 days.  Dispense: 20 Tab; Refill: 0  - fluticasone (FLONASE) 50 MCG/ACT nasal spray; Spray 1 Spray in nose 2 times a day.  Dispense: 1 Bottle; Refill: 2      Followup: Return in about 6 months (around 12/20/2019) for Diabetes.

## 2019-06-20 NOTE — ASSESSMENT & PLAN NOTE
Patient is tolerating lisinopril 5 mg daily, spironolactone 50 mg daily and metoprolol 25 mg twice daily

## 2019-06-20 NOTE — ASSESSMENT & PLAN NOTE
Patient has bilateral myringotomy and noticed that right ear was draining starting yesterday.  He has right ear pain as well as the drainage.

## 2019-06-20 NOTE — ASSESSMENT & PLAN NOTE
Patient is currently taking glipizide 5 mg twice daily, metformin 1000 mg twice daily, Actos 30 mg daily and Lantus 44 units daily.  Her A1c is stable at 7.0.

## 2019-07-03 ENCOUNTER — TELEPHONE (OUTPATIENT)
Dept: MEDICAL GROUP | Facility: MEDICAL CENTER | Age: 64
End: 2019-07-03

## 2019-07-03 DIAGNOSIS — I48.20 CHRONIC ATRIAL FIBRILLATION (HCC): ICD-10-CM

## 2019-07-03 RX ORDER — WARFARIN SODIUM 1 MG/1
4 TABLET ORAL DAILY
COMMUNITY
Start: 2019-07-03 | End: 2019-10-22 | Stop reason: SDUPTHER

## 2019-07-03 NOTE — TELEPHONE ENCOUNTER
----- Message from Marvin Barreto M.D. sent at 7/3/2019 12:32 PM PDT -----  Please notify patient that his INR is slightly low at 1.8, goal INR is between 2 and 3.  Please ask what his Coumadin dosing is currently, because we may need to increase it slightly.  Marvin Barreto M.D.

## 2019-07-08 RX ORDER — SPIRONOLACTONE 50 MG/1
TABLET, FILM COATED ORAL
Qty: 180 TAB | Refills: 3 | Status: SHIPPED | OUTPATIENT
Start: 2019-07-08 | End: 2019-09-13 | Stop reason: SDUPTHER

## 2019-09-13 RX ORDER — SPIRONOLACTONE 50 MG/1
TABLET, FILM COATED ORAL
Qty: 180 TAB | Refills: 3 | Status: SHIPPED | OUTPATIENT
Start: 2019-09-13 | End: 2020-11-20 | Stop reason: SDUPTHER

## 2019-09-30 RX ORDER — WARFARIN SODIUM 5 MG/1
TABLET ORAL
Qty: 180 TAB | Refills: 3 | Status: SHIPPED | OUTPATIENT
Start: 2019-09-30 | End: 2019-10-31 | Stop reason: SDUPTHER

## 2019-10-21 ENCOUNTER — PATIENT MESSAGE (OUTPATIENT)
Dept: MEDICAL GROUP | Facility: MEDICAL CENTER | Age: 64
End: 2019-10-21

## 2019-10-21 DIAGNOSIS — I48.20 CHRONIC ATRIAL FIBRILLATION (HCC): ICD-10-CM

## 2019-10-22 RX ORDER — WARFARIN SODIUM 1 MG/1
4 TABLET ORAL DAILY
Qty: 360 TAB | Refills: 3 | Status: SHIPPED | OUTPATIENT
Start: 2019-10-22 | End: 2020-06-22 | Stop reason: SDUPTHER

## 2019-10-22 NOTE — TELEPHONE ENCOUNTER
From: Jethro Guillen  To: Marvin Barreto M.D.  Sent: 10/21/2019 9:48 PM PDT  Subject: Prescription Question    Dr. Barreto my Warfarin 1mg Rx needs to be refilled. I am travelling out of town and expect to return to [place] on the 29th. My current supply of the 1mg pills will most likely run out on the 27th. If possible please send the refill authorization to the [facility] in the [facility] in [place].    Thanks, Nichelle Guillen

## 2019-10-28 RX ORDER — PRAVASTATIN SODIUM 80 MG/1
TABLET ORAL
Qty: 90 TAB | Refills: 3 | Status: SHIPPED | OUTPATIENT
Start: 2019-10-28 | End: 2020-01-29 | Stop reason: SDUPTHER

## 2019-10-31 RX ORDER — WARFARIN SODIUM 5 MG/1
TABLET ORAL
Qty: 180 TAB | Refills: 3 | Status: SHIPPED | OUTPATIENT
Start: 2019-10-31 | End: 2020-11-20 | Stop reason: SDUPTHER

## 2019-11-19 ENCOUNTER — TELEPHONE (OUTPATIENT)
Dept: SLEEP MEDICINE | Facility: MEDICAL CENTER | Age: 64
End: 2019-11-19

## 2019-11-19 NOTE — TELEPHONE ENCOUNTER
[facility] She is asking for an updated order for mask and supplies. She no longer wants to use [facility]. She was wondering if [facility] can be used? Please advise.

## 2020-01-30 RX ORDER — ESTRADIOL 2 MG/1
TABLET ORAL
Qty: 225 TAB | Refills: 3 | Status: SHIPPED | OUTPATIENT
Start: 2020-01-30 | End: 2020-11-20 | Stop reason: SDUPTHER

## 2020-01-30 RX ORDER — PRAVASTATIN SODIUM 80 MG/1
80 TABLET ORAL
Qty: 90 TAB | Refills: 3 | Status: SHIPPED | OUTPATIENT
Start: 2020-01-30 | End: 2020-11-20 | Stop reason: SDUPTHER

## 2020-01-30 RX ORDER — LISINOPRIL 5 MG/1
5 TABLET ORAL
Qty: 90 TAB | Refills: 3 | Status: SHIPPED | OUTPATIENT
Start: 2020-01-30 | End: 2020-11-20 | Stop reason: SDUPTHER

## 2020-02-06 ENCOUNTER — HOSPITAL ENCOUNTER (OUTPATIENT)
Dept: LAB | Facility: MEDICAL CENTER | Age: 65
End: 2020-02-06
Attending: FAMILY MEDICINE
Payer: COMMERCIAL

## 2020-02-06 DIAGNOSIS — E11.9 CONTROLLED TYPE 2 DIABETES MELLITUS WITHOUT COMPLICATION, WITH LONG-TERM CURRENT USE OF INSULIN (HCC): ICD-10-CM

## 2020-02-06 DIAGNOSIS — Z79.4 CONTROLLED TYPE 2 DIABETES MELLITUS WITHOUT COMPLICATION, WITH LONG-TERM CURRENT USE OF INSULIN (HCC): ICD-10-CM

## 2020-02-06 LAB
ALBUMIN SERPL BCP-MCNC: 3.7 G/DL (ref 3.2–4.9)
ALBUMIN/GLOB SERPL: 1 G/DL
ALP SERPL-CCNC: 54 U/L (ref 30–99)
ALT SERPL-CCNC: 37 U/L (ref 2–50)
ANION GAP SERPL CALC-SCNC: 9 MMOL/L (ref 0–11.9)
AST SERPL-CCNC: 42 U/L (ref 12–45)
BILIRUB SERPL-MCNC: 0.7 MG/DL (ref 0.1–1.5)
BUN SERPL-MCNC: 15 MG/DL (ref 8–22)
CALCIUM SERPL-MCNC: 9.1 MG/DL (ref 8.5–10.5)
CHLORIDE SERPL-SCNC: 107 MMOL/L (ref 96–112)
CHOLEST SERPL-MCNC: 164 MG/DL (ref 100–199)
CO2 SERPL-SCNC: 20 MMOL/L (ref 20–33)
CREAT SERPL-MCNC: 0.98 MG/DL (ref 0.5–1.4)
CREAT UR-MCNC: 118.9 MG/DL
EST. AVERAGE GLUCOSE BLD GHB EST-MCNC: 146 MG/DL
FASTING STATUS PATIENT QL REPORTED: NORMAL
GLOBULIN SER CALC-MCNC: 3.7 G/DL (ref 1.9–3.5)
GLUCOSE SERPL-MCNC: 114 MG/DL (ref 65–99)
HBA1C MFR BLD: 6.7 % (ref 0–5.6)
HDLC SERPL-MCNC: 52 MG/DL
LDLC SERPL CALC-MCNC: 81 MG/DL
MICROALBUMIN UR-MCNC: <0.7 MG/DL
MICROALBUMIN/CREAT UR: NORMAL MG/G (ref 0–30)
POTASSIUM SERPL-SCNC: 4.4 MMOL/L (ref 3.6–5.5)
PROT SERPL-MCNC: 7.4 G/DL (ref 6–8.2)
SODIUM SERPL-SCNC: 136 MMOL/L (ref 135–145)
TRIGL SERPL-MCNC: 154 MG/DL (ref 0–149)

## 2020-02-06 PROCEDURE — 82043 UR ALBUMIN QUANTITATIVE: CPT

## 2020-02-06 PROCEDURE — 36415 COLL VENOUS BLD VENIPUNCTURE: CPT

## 2020-02-06 PROCEDURE — 80061 LIPID PANEL: CPT

## 2020-02-06 PROCEDURE — 80053 COMPREHEN METABOLIC PANEL: CPT

## 2020-02-06 PROCEDURE — 83036 HEMOGLOBIN GLYCOSYLATED A1C: CPT

## 2020-02-06 PROCEDURE — 82570 ASSAY OF URINE CREATININE: CPT

## 2020-02-13 ENCOUNTER — OFFICE VISIT (OUTPATIENT)
Dept: MEDICAL GROUP | Facility: MEDICAL CENTER | Age: 65
End: 2020-02-13
Payer: COMMERCIAL

## 2020-02-13 VITALS
SYSTOLIC BLOOD PRESSURE: 120 MMHG | HEIGHT: 66 IN | BODY MASS INDEX: 49.34 KG/M2 | HEART RATE: 89 BPM | WEIGHT: 307 LBS | DIASTOLIC BLOOD PRESSURE: 78 MMHG | TEMPERATURE: 96.1 F | OXYGEN SATURATION: 95 %

## 2020-02-13 DIAGNOSIS — Z79.4 CONTROLLED TYPE 2 DIABETES MELLITUS WITHOUT COMPLICATION, WITH LONG-TERM CURRENT USE OF INSULIN (HCC): ICD-10-CM

## 2020-02-13 DIAGNOSIS — E11.9 CONTROLLED TYPE 2 DIABETES MELLITUS WITHOUT COMPLICATION, WITH LONG-TERM CURRENT USE OF INSULIN (HCC): ICD-10-CM

## 2020-02-13 DIAGNOSIS — E78.2 MIXED HYPERLIPIDEMIA: ICD-10-CM

## 2020-02-13 DIAGNOSIS — Z23 NEED FOR VACCINATION: ICD-10-CM

## 2020-02-13 DIAGNOSIS — I10 ESSENTIAL HYPERTENSION: ICD-10-CM

## 2020-02-13 DIAGNOSIS — Z79.01 ANTICOAGULATION GOAL OF INR 2 TO 3: ICD-10-CM

## 2020-02-13 DIAGNOSIS — I48.91 ATRIAL FIBRILLATION, UNSPECIFIED TYPE (HCC): ICD-10-CM

## 2020-02-13 DIAGNOSIS — Z51.81 ANTICOAGULATION GOAL OF INR 2 TO 3: ICD-10-CM

## 2020-02-13 PROCEDURE — 90471 IMMUNIZATION ADMIN: CPT | Performed by: FAMILY MEDICINE

## 2020-02-13 PROCEDURE — 99214 OFFICE O/P EST MOD 30 MIN: CPT | Performed by: FAMILY MEDICINE

## 2020-02-13 PROCEDURE — 90662 IIV NO PRSV INCREASED AG IM: CPT | Performed by: FAMILY MEDICINE

## 2020-02-13 PROCEDURE — 90472 IMMUNIZATION ADMIN EACH ADD: CPT | Performed by: FAMILY MEDICINE

## 2020-02-13 PROCEDURE — 90732 PPSV23 VACC 2 YRS+ SUBQ/IM: CPT | Performed by: FAMILY MEDICINE

## 2020-02-13 RX ORDER — EMPAGLIFLOZIN 10 MG/1
1 TABLET, FILM COATED ORAL DAILY
Qty: 30 TAB | Refills: 11 | Status: SHIPPED | OUTPATIENT
Start: 2020-02-13 | End: 2020-11-20 | Stop reason: SDUPTHER

## 2020-02-13 RX ORDER — INSULIN GLARGINE 100 [IU]/ML
INJECTION, SOLUTION SUBCUTANEOUS
Qty: 45 PEN | Refills: 0 | COMMUNITY
Start: 2020-02-13 | End: 2020-05-08 | Stop reason: SDUPTHER

## 2020-02-13 RX ORDER — DULAGLUTIDE 0.75 MG/.5ML
0.75 INJECTION, SOLUTION SUBCUTANEOUS
Qty: 4 PEN | Refills: 11 | Status: SHIPPED | OUTPATIENT
Start: 2020-02-13 | End: 2020-11-20 | Stop reason: SDUPTHER

## 2020-02-13 ASSESSMENT — PATIENT HEALTH QUESTIONNAIRE - PHQ9: CLINICAL INTERPRETATION OF PHQ2 SCORE: 0

## 2020-02-13 NOTE — ASSESSMENT & PLAN NOTE
Patient is tolerating lisinopril 5 mg daily, spironolactone 50 mg daily and metoprolol 25 mg twice daily.

## 2020-02-13 NOTE — ASSESSMENT & PLAN NOTE
Patient is currently tolerating pravastatin 80 mg daily and fenofibrate 54 mg daily.  Reviewed lab work with patient.  Results for CARLA SERVIN (MRN [id number]) as of 2/13/2020 12:06   Ref. Range 2/6/2020 13:29   Cholesterol,Tot Latest Ref Range: 100 - 199 mg/dL 164   Triglycerides Latest Ref Range: 0 - 149 mg/dL 154 (H)   HDL Latest Ref Range: >=40 mg/dL 52   LDL Latest Ref Range: <100 mg/dL 81

## 2020-02-13 NOTE — PROGRESS NOTES
Subjective:   Jethro Guillen is a 64 y.o. adult here today for diabetes    Controlled type 2 diabetes mellitus without complication, with long-term current use of insulin (HCC)  Patient is on glipizide 5 mg twice daily, Lantus 44 units daily, metformin 1000 mg twice daily and p.o. glitazone 30 mg daily.  His A1c is 6.7.  However, he is complaining of weight gain.  Patient has not been tried on SGLT2 inhibitors or GLP-1 agonist.    HTN (hypertension)  Patient is tolerating lisinopril 5 mg daily, spironolactone 50 mg daily and metoprolol 25 mg twice daily.    Hyperlipidemia  Patient is currently tolerating pravastatin 80 mg daily and fenofibrate 54 mg daily.  Reviewed lab work with patient.  Results for CARLA GUILLEN (MRN [id number]) as of 2/13/2020 12:06   Ref. Range 2/6/2020 13:29   Cholesterol,Tot Latest Ref Range: 100 - 199 mg/dL 164   Triglycerides Latest Ref Range: 0 - 149 mg/dL 154 (H)   HDL Latest Ref Range: >=40 mg/dL 52   LDL Latest Ref Range: <100 mg/dL 81       Atrial fibrillation  Patient is taking Coumadin without any evidence of bleeding.  Patient is checking INR every 2 to 4 weeks, most recent INR has been controlled.  She spends long weekends in [place] living at her second home, so it is helpful to have a portable INR machine.         Current medicines (including changes today)  Current Outpatient Medications   Medication Sig Dispense Refill   • Empagliflozin (JARDIANCE) 10 MG Tab Take 1 Tab by mouth every day. 30 Tab 11   • Dulaglutide (TRULICITY) 0.75 MG/0.5ML Solution Pen-injector Inject 0.75 mg as instructed every 7 days. 4 PEN 11   • insulin glargine (LANTUS SOLOSTAR) 100 UNIT/ML Solution Pen-injector injection INJECT 22 UNITS SUBCUTANEOUSLY EVERY EVENING AS DIRECTED BY PHYSICIAN 45 PEN 0   • lisinopril (PRINIVIL) 5 MG Tab Take 1 Tab by mouth every day. 90 Tab 3   • estradiol (ESTRACE) 2 MG Tab TAKE 2 & 1/2 TABLETS BY MOUTH EVERY  Tab 3   • pravastatin (PRAVACHOL) 80 MG tablet Take 1 Tab by mouth  "every bedtime. 90 Tab 3   • warfarin (COUMADIN) 5 MG Tab TAKE 2 TABLETS BY MOUTH EVERY  Tab 3   • warfarin (COUMADIN) 1 MG Tab Take 4 Tabs by mouth every day. 360 Tab 3   • metformin (GLUCOPHAGE) 1000 MG tablet TAKE 1 TABLET BY MOUTH TWICE A DAY WITH MEALS 180 Tab 3   • spironolactone (ALDACTONE) 50 MG Tab TAKE 1 TABLET BY MOUTH TWICE A  Tab 3   • fluticasone (FLONASE) 50 MCG/ACT nasal spray Spray 1 Spray in nose 2 times a day. 1 Bottle 2   • metoprolol (LOPRESSOR) 25 MG Tab TAKE 1 TABLET BY MOUTH 2 TIMES A DAY. 180 Tab 3   • pioglitazone (ACTOS) 30 MG Tab TAKE 1 TAB BY MOUTH EVERY DAY. 90 Tab 3   • aspirin (ASA) 325 MG Tab Take  by mouth.     • fenofibrate (TRICOR) 54 MG tablet Take  by mouth.     • neomycin-polymyxin-HC (PEDIOTIC HC) 3.5-01504-2 Suspension Place 8 Drops in right ear.     • ofloxacin otic sol (FLOXIN OTIC) 0.3 % Solution Place 5 Drops in right ear.     • omeprazole (PRILOSEC) 20 MG delayed-release capsule Take  by mouth.     • docosahexanoic acid (FISH OIL) 1000 MG CAPS Take 1 Cap by mouth 2 times a day. 60 Cap 6     No current facility-administered medications for this visit.      She  has a past medical history of Atrial fibrillation (HCC) (1/23/2012), Chronic anticoagulation (1/23/2012), Diabetes ([facility]), 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, Obesity (1/23/2012), Obstructive sleep apnea (1/23/2012), OM (otitis media), and Reflux. She also has no past medical history of Asthma or COPD (chronic obstructive pulmonary disease) ([facility]).    ROS   No chest pain, no shortness of breath       Objective:     /78 (BP Location: Right arm, Patient Position: Sitting)   Pulse 89   Temp (!) 35.6 °C (96.1 °F)   Ht 1.676 m (5' 6\")   Wt (!) 139.3 kg (307 lb)   SpO2 95%  Body mass index is 49.55 kg/m².   Physical Exam:  Constitutional: Alert, no distress.  Skin: Warm, dry, good turgor, no rashes in visible areas.  Eye: Equal, round and reactive, conjunctiva clear, lids normal.  Psych: " Alert and oriented x3, normal affect and mood.        Assessment and Plan:   The following treatment plan was discussed    1. Controlled type 2 diabetes mellitus without complication, with long-term current use of insulin (HCC)  Controlled but having significant weight gain with current regimen.  Will discontinue glipizide 5 mg twice daily and decrease Lantus dose in half from 44 units daily to 22 units daily.  We will start Jardiance 10 mg daily and Trulicity 0.75 mg weekly.  Follow-up in 3 months for in clinic A1c.  - Empagliflozin (JARDIANCE) 10 MG Tab; Take 1 Tab by mouth every day.  Dispense: 30 Tab; Refill: 11  - Dulaglutide (TRULICITY) 0.75 MG/0.5ML Solution Pen-injector; Inject 0.75 mg as instructed every 7 days.  Dispense: 4 PEN; Refill: 11  - insulin glargine (LANTUS SOLOSTAR) 100 UNIT/ML Solution Pen-injector injection; INJECT 22 UNITS SUBCUTANEOUSLY EVERY EVENING AS DIRECTED BY PHYSICIAN  Dispense: 45 PEN; Refill: 0    2. Mixed hyperlipidemia  Stable.  Continue current medications.    3. Essential hypertension  Controlled.  Continue current medications.    4. Need for vaccination  - Pneumococal Polysaccharide Vaccine 23-Valent =>1YO SQ/IM  - Influenza Vaccine, High Dose (65+ Only)    5. Atrial fibrillation, unspecified type (HCC)  Continue Coumadin and checking INRs regularly every 2 to 4 weeks.    6. Anticoagulation goal of INR 2 to 3  INR controlled on recent check.      Followup: Return in about 3 months (around 5/13/2020) for Diabetes.

## 2020-02-13 NOTE — ASSESSMENT & PLAN NOTE
Patient is on glipizide 5 mg twice daily, Lantus 44 units daily, metformin 1000 mg twice daily and p.o. glitazone 30 mg daily.  His A1c is 6.7.  However, he is complaining of weight gain.  Patient has not been tried on SGLT2 inhibitors or GLP-1 agonist.

## 2020-02-14 ENCOUNTER — TELEPHONE (OUTPATIENT)
Dept: MEDICAL GROUP | Facility: MEDICAL CENTER | Age: 65
End: 2020-02-14

## 2020-02-14 NOTE — ASSESSMENT & PLAN NOTE
Patient is taking Coumadin without any evidence of bleeding.  Patient is checking INR every 2 to 4 weeks, most recent INR has been controlled.  She spends long weekends in [place] living at her second home, so it is helpful to have a portable INR machine.

## 2020-02-14 NOTE — TELEPHONE ENCOUNTER
[facility] called. They received patient's order renewal for INR testing. They are requesting notes that include need for testing to be faxed. Please advise.

## 2020-02-25 ENCOUNTER — PATIENT MESSAGE (OUTPATIENT)
Dept: MEDICAL GROUP | Facility: MEDICAL CENTER | Age: 65
End: 2020-02-25

## 2020-02-25 NOTE — TELEPHONE ENCOUNTER
Further information was faxed to insurance this afternoon. Please advise to rest of patient's message.

## 2020-03-23 ENCOUNTER — TELEPHONE (OUTPATIENT)
Dept: MEDICAL GROUP | Facility: MEDICAL CENTER | Age: 65
End: 2020-03-23

## 2020-03-23 NOTE — TELEPHONE ENCOUNTER
----- Message from Marvin Barreto M.D. sent at 3/23/2020  6:53 AM PDT -----  INR is in therapeutic range. Continue current dose of Coumadin. Recheck INR in 1 month.  Marvin Barreto MD

## 2020-04-23 ENCOUNTER — TELEPHONE (OUTPATIENT)
Dept: MEDICAL GROUP | Facility: MEDICAL CENTER | Age: 65
End: 2020-04-23

## 2020-04-23 NOTE — TELEPHONE ENCOUNTER
----- Message from Marvin Barreto M.D. sent at 4/23/2020  6:47 AM PDT -----  INR is in therapeutic range. Continue current dose of Coumadin. Recheck INR in 1 month.  Marvin Barreto MD

## 2020-04-23 NOTE — TELEPHONE ENCOUNTER
Left a message for patient to call back at [phone].     Alejandra Agee  [facility] Assistant

## 2020-05-08 DIAGNOSIS — E11.9 CONTROLLED TYPE 2 DIABETES MELLITUS WITHOUT COMPLICATION, WITH LONG-TERM CURRENT USE OF INSULIN (HCC): ICD-10-CM

## 2020-05-08 DIAGNOSIS — Z79.4 CONTROLLED TYPE 2 DIABETES MELLITUS WITHOUT COMPLICATION, WITH LONG-TERM CURRENT USE OF INSULIN (HCC): ICD-10-CM

## 2020-05-08 RX ORDER — INSULIN GLARGINE 100 [IU]/ML
INJECTION, SOLUTION SUBCUTANEOUS
Qty: 45 PEN | Refills: 5 | Status: SHIPPED | OUTPATIENT
Start: 2020-05-08 | End: 2020-05-13 | Stop reason: SDUPTHER

## 2020-05-13 DIAGNOSIS — E11.9 CONTROLLED TYPE 2 DIABETES MELLITUS WITHOUT COMPLICATION, WITH LONG-TERM CURRENT USE OF INSULIN (HCC): ICD-10-CM

## 2020-05-13 DIAGNOSIS — Z79.4 CONTROLLED TYPE 2 DIABETES MELLITUS WITHOUT COMPLICATION, WITH LONG-TERM CURRENT USE OF INSULIN (HCC): ICD-10-CM

## 2020-05-13 RX ORDER — INSULIN GLARGINE 100 [IU]/ML
INJECTION, SOLUTION SUBCUTANEOUS
Qty: 45 PEN | Refills: 3 | Status: SHIPPED | OUTPATIENT
Start: 2020-05-13 | End: 2020-11-20 | Stop reason: SDUPTHER

## 2020-05-28 RX ORDER — PIOGLITAZONEHYDROCHLORIDE 30 MG/1
30 TABLET ORAL DAILY
Qty: 90 TAB | Refills: 3 | Status: SHIPPED | OUTPATIENT
Start: 2020-05-28 | End: 2021-04-06 | Stop reason: SDUPTHER

## 2020-05-29 ENCOUNTER — OFFICE VISIT (OUTPATIENT)
Dept: MEDICAL GROUP | Facility: MEDICAL CENTER | Age: 65
End: 2020-05-29
Payer: COMMERCIAL

## 2020-05-29 VITALS
BODY MASS INDEX: 47.89 KG/M2 | WEIGHT: 298 LBS | TEMPERATURE: 97.2 F | OXYGEN SATURATION: 95 % | HEIGHT: 66 IN | HEART RATE: 99 BPM | SYSTOLIC BLOOD PRESSURE: 118 MMHG | DIASTOLIC BLOOD PRESSURE: 80 MMHG

## 2020-05-29 DIAGNOSIS — Z79.4 CONTROLLED TYPE 2 DIABETES MELLITUS WITHOUT COMPLICATION, WITH LONG-TERM CURRENT USE OF INSULIN (HCC): ICD-10-CM

## 2020-05-29 DIAGNOSIS — E11.9 CONTROLLED TYPE 2 DIABETES MELLITUS WITHOUT COMPLICATION, WITH LONG-TERM CURRENT USE OF INSULIN (HCC): ICD-10-CM

## 2020-05-29 DIAGNOSIS — E78.2 MIXED HYPERLIPIDEMIA: ICD-10-CM

## 2020-05-29 DIAGNOSIS — I10 ESSENTIAL HYPERTENSION: ICD-10-CM

## 2020-05-29 LAB
HBA1C MFR BLD: 6.1 % (ref 0–5.6)
INT CON NEG: NEGATIVE
INT CON POS: POSITIVE

## 2020-05-29 PROCEDURE — 99214 OFFICE O/P EST MOD 30 MIN: CPT | Performed by: FAMILY MEDICINE

## 2020-05-29 PROCEDURE — 83036 HEMOGLOBIN GLYCOSYLATED A1C: CPT | Performed by: FAMILY MEDICINE

## 2020-05-29 ASSESSMENT — FIBROSIS 4 INDEX: FIB4 SCORE: 2.85

## 2020-05-29 NOTE — ASSESSMENT & PLAN NOTE
3 months ago we stopped glipizide, decreased lantus 44 units to 22 units, we started Jardiance 10 mg daily and Trulicity 0.75 mg weekly, continued Actos 30 mg daily and metformin 1000 mg twice daily. A1c improved from 6.7 to 6.1. He has lost 9 pounds in 3 months since medication changes. No urinary complaints.

## 2020-05-29 NOTE — PROGRESS NOTES
Subjective:   Jethro Guillen is a 64 y.o. adult here today for diabetes    Controlled type 2 diabetes mellitus without complication, with long-term current use of insulin (HCC)  3 months ago we stopped glipizide, decreased lantus 44 units to 22 units, we started Jardiance 10 mg daily and Trulicity 0.75 mg weekly, continued Actos 30 mg daily and metformin 1000 mg twice daily. A1c improved from 6.7 to 6.1. He has lost 9 pounds in 3 months since medication changes. No urinary complaints.    HTN (hypertension)  Patient is on lisinopril 5 mg daily.    Hyperlipidemia  Tolerating pravastatin 80 mg daily and fenofibrate 54 mg daily.         Current medicines (including changes today)  Current Outpatient Medications   Medication Sig Dispense Refill   • pioglitazone (ACTOS) 30 MG Tab Take 1 Tab by mouth every day. 90 Tab 3   • insulin glargine (LANTUS SOLOSTAR) 100 UNIT/ML Solution Pen-injector injection INJECT 22 UNITS SUBCUTANEOUSLY EVERY EVENING AS DIRECTED BY PHYSICIAN 45 PEN 3   • Empagliflozin (JARDIANCE) 10 MG Tab Take 1 Tab by mouth every day. 30 Tab 11   • Dulaglutide (TRULICITY) 0.75 MG/0.5ML Solution Pen-injector Inject 0.75 mg as instructed every 7 days. 4 PEN 11   • lisinopril (PRINIVIL) 5 MG Tab Take 1 Tab by mouth every day. 90 Tab 3   • estradiol (ESTRACE) 2 MG Tab TAKE 2 & 1/2 TABLETS BY MOUTH EVERY  Tab 3   • pravastatin (PRAVACHOL) 80 MG tablet Take 1 Tab by mouth every bedtime. 90 Tab 3   • warfarin (COUMADIN) 5 MG Tab TAKE 2 TABLETS BY MOUTH EVERY  Tab 3   • warfarin (COUMADIN) 1 MG Tab Take 4 Tabs by mouth every day. 360 Tab 3   • metformin (GLUCOPHAGE) 1000 MG tablet TAKE 1 TABLET BY MOUTH TWICE A DAY WITH MEALS 180 Tab 3   • spironolactone (ALDACTONE) 50 MG Tab TAKE 1 TABLET BY MOUTH TWICE A  Tab 3   • fluticasone (FLONASE) 50 MCG/ACT nasal spray Spray 1 Spray in nose 2 times a day. 1 Bottle 2   • metoprolol (LOPRESSOR) 25 MG Tab TAKE 1 TABLET BY MOUTH 2 TIMES A DAY. 180 Tab 3   •  "aspirin (ASA) 325 MG Tab Take  by mouth.     • fenofibrate (TRICOR) 54 MG tablet Take  by mouth.     • neomycin-polymyxin-HC (PEDIOTIC HC) 3.5-30115-0 Suspension Place 8 Drops in right ear.     • ofloxacin otic sol (FLOXIN OTIC) 0.3 % Solution Place 5 Drops in right ear.     • omeprazole (PRILOSEC) 20 MG delayed-release capsule Take  by mouth.     • docosahexanoic acid (FISH OIL) 1000 MG CAPS Take 1 Cap by mouth 2 times a day. 60 Cap 6     No current facility-administered medications for this visit.      She  has a past medical history of Atrial fibrillation ([facility]) (1/23/2012), Chronic anticoagulation (1/23/2012), Diabetes ([facility]), 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, Obesity (1/23/2012), Obstructive sleep apnea (1/23/2012), OM (otitis media), and Reflux. She also has no past medical history of Asthma or COPD (chronic obstructive pulmonary disease) ([facility]).    ROS   No chest pain, no shortness of breath       Objective:     /80 (BP Location: Right arm, Patient Position: Sitting)   Pulse 99   Temp 36.2 °C (97.2 °F)   Ht 1.676 m (5' 6\")   Wt (!) 135.2 kg (298 lb)   SpO2 95%  Body mass index is 48.1 kg/m².   Physical Exam:  Constitutional: Alert, no distress.  Skin: Warm, dry, good turgor, no rashes in visible areas.  Eye: Equal, round and reactive, conjunctiva clear, lids normal.  Psych: Alert and oriented x3, normal affect and mood.        Assessment and Plan:   The following treatment plan was discussed    1. Controlled type 2 diabetes mellitus without complication, with long-term current use of insulin ([facility])  Doing very well currently regimen.  Patient would like to hold off on decreasing Lantus further.  Follow-up with labs in 6 months.  - POCT Hemoglobin A1C  - CBC WITH DIFFERENTIAL; Future  - Comp Metabolic Panel; Future  - Lipid Profile; Future  - HEMOGLOBIN A1C; Future  - MICROALBUMIN CREAT RATIO URINE; Future  - TSH WITH REFLEX TO FT4; Future    2. Essential hypertension  Controlled.  Continue " lisinopril.    3. Mixed hyperlipidemia  Hopefully weight loss improves her LDL further.  Continue current medications and follow-up with labs in 6 months.      Followup: Return in about 6 months (around 11/29/2020) for Diabetes.

## 2020-06-22 ENCOUNTER — TELEPHONE (OUTPATIENT)
Dept: MEDICAL GROUP | Facility: MEDICAL CENTER | Age: 65
End: 2020-06-22

## 2020-06-22 DIAGNOSIS — I48.20 CHRONIC ATRIAL FIBRILLATION (HCC): ICD-10-CM

## 2020-06-22 RX ORDER — WARFARIN SODIUM 1 MG/1
2 TABLET ORAL DAILY
Qty: 180 TAB | Refills: 3 | COMMUNITY
Start: 2020-06-22 | End: 2020-07-31 | Stop reason: SDUPTHER

## 2020-06-22 NOTE — TELEPHONE ENCOUNTER
Please have her decrease to coumadin 12 mg everyday and recheck INR in 2-4 weeks.  Marvin Barreto M.D.

## 2020-06-24 ENCOUNTER — SLEEP CENTER VISIT (OUTPATIENT)
Dept: SLEEP MEDICINE | Facility: MEDICAL CENTER | Age: 65
End: 2020-06-24
Payer: COMMERCIAL

## 2020-06-24 VITALS
SYSTOLIC BLOOD PRESSURE: 128 MMHG | BODY MASS INDEX: 47.89 KG/M2 | HEART RATE: 85 BPM | HEIGHT: 66 IN | DIASTOLIC BLOOD PRESSURE: 62 MMHG | OXYGEN SATURATION: 93 % | WEIGHT: 298 LBS

## 2020-06-24 DIAGNOSIS — I48.91 ATRIAL FIBRILLATION, UNSPECIFIED TYPE (HCC): ICD-10-CM

## 2020-06-24 DIAGNOSIS — G47.33 OSA TREATED WITH BIPAP: ICD-10-CM

## 2020-06-24 DIAGNOSIS — I10 ESSENTIAL HYPERTENSION: ICD-10-CM

## 2020-06-24 DIAGNOSIS — Z79.01 ANTICOAGULATED ON COUMADIN: ICD-10-CM

## 2020-06-24 PROCEDURE — 99213 OFFICE O/P EST LOW 20 MIN: CPT | Performed by: NURSE PRACTITIONER

## 2020-06-24 ASSESSMENT — FIBROSIS 4 INDEX: FIB4 SCORE: 2.85

## 2020-06-24 NOTE — PROGRESS NOTES
Chief Complaint   Patient presents with   • Follow-Up     ANAMIKA-Last seen 08/24/2017       HPI:      Jethro, who likes to be called Nichelle is a 63 y/o female patient who is in today for annual ANAMIKA f/u, overdue. She is a transgender male, now female. PMH includes A-fib on Coumadin, obesity, hormonal imbalance in transgender patient, DM II, h/o hepatitis, fatty liver, hyperlipidemia.     PSG from 2008 at an outside source indicated an AHI of 99.7 and low oxygen of 77%.     Compliance report from 5/25/20-6/23/20 was downloaded and reviewed with the patient which showed BiPAP at 16.6/15 cmH2O, 60% compliance, 8 hrs 15 min use (57% compliance), AHI of 1.1.  She uses a second machine that she has in [place] on the weekends, and will bring in that chip to her next office visit for review. She works in [place] during the week and on the weekends she goes to her home in [place].She is tolerating the pressure and mask well.  She is not sure how the pressure changed.  She goes to bed 2 am and wakes up at 10 am.  She works late hours for the 's office and does not get up until 11 PM.  She follows up with her PCP 2-3 times a year.  She checks her Coumadin and since the results to the clinic.  She no longer follows up with a cardiologist.  She does not have a routine exercise program due to being busy at work.  2 years ago she fractured her left ankle after falling off of a fence.  This did not require surgery.  She denies any new health problems or medications.      ROS:  Constitutional: Denies fevers, chills, sweats, fatigue, weight loss  Eyes: Denies glasses, vision loss, pain, drainage, double vision  Ears/Nose/Mouth/Throat: Denies rhinitis, nasal congestion, ear ache, difficulty hearing, sore throat, persistent hoarseness, decayed teeth/toothache  Cardiovascular: Denies chest pain, tightness, palpitations, swelling in feet/legs, fainting, difficulty breathing when laying down  Respiratory: Denies shortness of breath,  cough, sputum, wheezing, painful breathing, coughing up blood  GI: Denies heartburn, difficulty swallowing, nausea, vomiting, abdominal pain, diarrhea, constiptation  : Denies frequent urination, painful urination  Integumentary: Denies rashes, lumps or color changes  Neurological: Denies frequent headaches, dizziness, weakness  Sleep: Denies daytime sleepiness, loud snoring, stops breathing during sleep, waking up gasping for air, insomnia, morning headaches      Past Medical History:   Diagnosis Date   • Atrial fibrillation (HCC) 1/23/2012   • Chronic anticoagulation 1/23/2012   • Diabetes ([facility])    • 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    • Obesity 1/23/2012   • Obstructive sleep apnea 1/23/2012   • OM (otitis media)     recurrent otitis as well as mild hearing loss   • Reflux     s/p dilitaion       Past Surgical History:   Procedure Laterality Date   • EAR MIDDLE EXPLORATION     • PB ESOPHAGOSCOPY,DILATION,<30MM         Family History   Problem Relation Age of Onset   • Hyperlipidemia Mother    • Lung Disease Brother    • Heart Disease Brother        Social History     Socioeconomic History   • Marital status:      Spouse name: Not on file   • Number of children: Not on file   • Years of education: Not on file   • Highest education level: Not on file   Occupational History   • Not on file   Social Needs   • Financial resource strain: Not on file   • Food insecurity     Worry: Not on file     Inability: Not on file   • Transportation needs     Medical: Not on file     Non-medical: Not on file   Tobacco Use   • Smoking status: Never Smoker   • Smokeless tobacco: Never Used   Substance and Sexual Activity   • Alcohol use: No     Alcohol/week: 0.0 oz   • Drug use: No   • Sexual activity: Yes     Partners: Male   Lifestyle   • Physical activity     Days per week: Not on file     Minutes per session: Not on file   • Stress: Not on file   Relationships   • Social connections     Talks on phone: Not on  file     Gets together: Not on file     Attends [employer] service: Not on file     Active member of club or organization: Not on file     Attends meetings of clubs or organizations: Not on file     Relationship status: Not on file   • Intimate partner violence     Fear of current or ex partner: Not on file     Emotionally abused: Not on file     Physically abused: Not on file     Forced sexual activity: Not on file   Other Topics Concern   • Not on file   Social History Narrative   • Not on file       Allergies as of 06/24/2020 - Reviewed 06/24/2020   Allergen Reaction Noted   • Nkda [no known drug allergy]  04/30/2013        Vitals:  Vitals:    06/24/20 1057   BP: 128/62   Pulse: 85   SpO2: 93%       Current medications as of today   Current Outpatient Medications   Medication Sig Dispense Refill   • warfarin (COUMADIN) 1 MG Tab Take 2 Tabs by mouth every day. 180 Tab 3   • insulin glargine (LANTUS SOLOSTAR) 100 UNIT/ML Solution Pen-injector injection INJECT 22 UNITS SUBCUTANEOUSLY EVERY EVENING AS DIRECTED BY PHYSICIAN 45 PEN 3   • Empagliflozin (JARDIANCE) 10 MG Tab Take 1 Tab by mouth every day. 30 Tab 11   • Dulaglutide (TRULICITY) 0.75 MG/0.5ML Solution Pen-injector Inject 0.75 mg as instructed every 7 days. 4 PEN 11   • lisinopril (PRINIVIL) 5 MG Tab Take 1 Tab by mouth every day. 90 Tab 3   • estradiol (ESTRACE) 2 MG Tab TAKE 2 & 1/2 TABLETS BY MOUTH EVERY  Tab 3   • pravastatin (PRAVACHOL) 80 MG tablet Take 1 Tab by mouth every bedtime. 90 Tab 3   • warfarin (COUMADIN) 5 MG Tab TAKE 2 TABLETS BY MOUTH EVERY  Tab 3   • metformin (GLUCOPHAGE) 1000 MG tablet TAKE 1 TABLET BY MOUTH TWICE A DAY WITH MEALS 180 Tab 3   • spironolactone (ALDACTONE) 50 MG Tab TAKE 1 TABLET BY MOUTH TWICE A  Tab 3   • fluticasone (FLONASE) 50 MCG/ACT nasal spray Spray 1 Spray in nose 2 times a day. 1 Bottle 2   • metoprolol (LOPRESSOR) 25 MG Tab TAKE 1 TABLET BY MOUTH 2 TIMES A DAY. 180 Tab 3   • fenofibrate  (TRICOR) 54 MG tablet Take  by mouth.     • neomycin-polymyxin-HC (PEDIOTIC HC) 3.5-64814-6 Suspension Place 8 Drops in right ear.     • ofloxacin otic sol (FLOXIN OTIC) 0.3 % Solution Place 5 Drops in right ear.     • docosahexanoic acid (FISH OIL) 1000 MG CAPS Take 1 Cap by mouth 2 times a day. 60 Cap 6   • pioglitazone (ACTOS) 30 MG Tab Take 1 Tab by mouth every day. 90 Tab 3   • aspirin (ASA) 325 MG Tab Take  by mouth.     • omeprazole (PRILOSEC) 20 MG delayed-release capsule Take  by mouth.       No current facility-administered medications for this visit.          Physical Exam:   Appearance: Well developed, well nourished, no acute distress  Eyes: PERRL, EOM intact, sclera white, conjunctiva moist  Ears: no lesions or deformities  Hearing: grossly intact  Nose: no lesions or deformities  Respiratory effort: no intercostal retractions or use of accessory muscles  Extremities: no cyanosis or edema  Abdomen: soft, large.   Gait and Station: normal  Digits and nails: no clubbing, cyanosis, petechiae or nodes.  Cranial nerves: grossly intact  Skin: no visible rashes, lesions or ulcers noted  Orientation: Oriented to time, person and place  Mood and affect: mood and affect appropriate, normal interaction with examiner  Judgement: Intact    Assessment:  1. ANAMIKA treated with BiPAP  DME Mask and Supplies    DME Other   2. Atrial fibrillation, unspecified type (HCC)     3. Anticoagulated on Coumadin     4. Essential hypertension     5. BMI 45.0-49.9, adult (HCC)           Plan    1. Compliance report from 5/25/20-6/23/20 was downloaded and reviewed with the patient which showed BiPAP at 16.615 cmH2O, 60% compliance, 8 hrs 15 min use (57% compliance), AHI of 1.1.  She uses a second machine that she has in [place] on the weekends, and will bring in that chip to her next office visit for review.  Continue BiPAP. Patient is compliant and benefiting from BiPAP therapy for management of ANAMIKA.   2. DME order (Preferred) for  mask (nasal mask or MOC) and supplies was provided today. Continue to clean mask and supplies weekly.   3. DME order to change BiPAP IPAP/EPAP to 20/16 cmH2O was provided today.   4. Continue to stay active as able.   5. Follow up with the appropriate healthcare practitioners for all other medical problems and issues.  6. BP today is 128/62, HR 85.   7. F/u in 3 months.       KAYLIE Sanders.      This dictation was created using voice recognition software. The accuracy of the dictation is limited to the abilities of the software. I expect there may be some errors of grammar and possibly content.

## 2020-07-15 DIAGNOSIS — I10 ESSENTIAL HYPERTENSION: ICD-10-CM

## 2020-07-16 DIAGNOSIS — I10 ESSENTIAL HYPERTENSION: ICD-10-CM

## 2020-07-31 ENCOUNTER — TELEPHONE (OUTPATIENT)
Dept: MEDICAL GROUP | Facility: MEDICAL CENTER | Age: 65
End: 2020-07-31

## 2020-07-31 DIAGNOSIS — I48.20 CHRONIC ATRIAL FIBRILLATION (HCC): ICD-10-CM

## 2020-07-31 RX ORDER — WARFARIN SODIUM 1 MG/1
1 TABLET ORAL DAILY
Qty: 90 TAB | Refills: 3 | Status: SHIPPED | OUTPATIENT
Start: 2020-07-31 | End: 2020-12-22 | Stop reason: SDUPTHER

## 2020-07-31 NOTE — TELEPHONE ENCOUNTER
Hold Coumadin tonight.  Have her decrease Coumadin from 12 mg daily to 11 mg daily starting tomorrow night.  Check INR in 1 week.  Marvin Barreto M.D.

## 2020-07-31 NOTE — TELEPHONE ENCOUNTER
----- Message from Marvin Barreto M.D. sent at 7/31/2020  7:38 AM PDT -----  Please notify patient his INR is 3.9.  Please ask if he has any ideas on why his number went up so much.  Please check what dose he is taking.  Please have him hold tonight's dose because we will need to make a change to his Coumadin regimen.  Marvin Barreto M.D.

## 2020-07-31 NOTE — TELEPHONE ENCOUNTER
Patient notified. She has not changed regimen, and is unsure why the number has gone up. Will hold tonight's dose. Please advise of new regimen.

## 2020-08-05 ENCOUNTER — TELEPHONE (OUTPATIENT)
Dept: MEDICAL GROUP | Facility: MEDICAL CENTER | Age: 65
End: 2020-08-05

## 2020-08-05 NOTE — TELEPHONE ENCOUNTER
----- Message from Marvin Barreto M.D. sent at 8/5/2020 10:38 AM PDT -----  INR is in therapeutic range at 2.5. Continue current dose of Coumadin. Recheck INR in 1 month.  Marvin Barreto MD

## 2020-09-22 ENCOUNTER — TELEPHONE (OUTPATIENT)
Dept: MEDICAL GROUP | Facility: MEDICAL CENTER | Age: 65
End: 2020-09-22

## 2020-11-12 ENCOUNTER — HOSPITAL ENCOUNTER (OUTPATIENT)
Dept: LAB | Facility: MEDICAL CENTER | Age: 65
End: 2020-11-12
Attending: FAMILY MEDICINE
Payer: COMMERCIAL

## 2020-11-12 DIAGNOSIS — E11.9 CONTROLLED TYPE 2 DIABETES MELLITUS WITHOUT COMPLICATION, WITH LONG-TERM CURRENT USE OF INSULIN (HCC): ICD-10-CM

## 2020-11-12 DIAGNOSIS — Z79.4 CONTROLLED TYPE 2 DIABETES MELLITUS WITHOUT COMPLICATION, WITH LONG-TERM CURRENT USE OF INSULIN (HCC): ICD-10-CM

## 2020-11-12 LAB
ALBUMIN SERPL BCP-MCNC: 3.8 G/DL (ref 3.2–4.9)
ALBUMIN/GLOB SERPL: 1 G/DL
ALP SERPL-CCNC: 91 U/L (ref 30–99)
ALT SERPL-CCNC: 40 U/L (ref 2–50)
ANION GAP SERPL CALC-SCNC: 10 MMOL/L (ref 7–16)
AST SERPL-CCNC: 54 U/L (ref 12–45)
BASOPHILS # BLD AUTO: 0.5 % (ref 0–1.8)
BASOPHILS # BLD: 0.03 K/UL (ref 0–0.12)
BILIRUB SERPL-MCNC: 0.6 MG/DL (ref 0.1–1.5)
BUN SERPL-MCNC: 19 MG/DL (ref 8–22)
CALCIUM SERPL-MCNC: 9.2 MG/DL (ref 8.4–10.2)
CHLORIDE SERPL-SCNC: 100 MMOL/L (ref 96–112)
CHOLEST SERPL-MCNC: 173 MG/DL (ref 100–199)
CO2 SERPL-SCNC: 23 MMOL/L (ref 20–33)
CREAT SERPL-MCNC: 1.1 MG/DL (ref 0.5–1.4)
CREAT UR-MCNC: 49.57 MG/DL
EOSINOPHIL # BLD AUTO: 0.19 K/UL (ref 0–0.51)
EOSINOPHIL NFR BLD: 3.4 % (ref 0–6.9)
ERYTHROCYTE [DISTWIDTH] IN BLOOD BY AUTOMATED COUNT: 53.2 FL (ref 35.9–50)
EST. AVERAGE GLUCOSE BLD GHB EST-MCNC: 143 MG/DL
FASTING STATUS PATIENT QL REPORTED: NORMAL
GLOBULIN SER CALC-MCNC: 4 G/DL (ref 1.9–3.5)
GLUCOSE SERPL-MCNC: 181 MG/DL (ref 65–99)
HBA1C MFR BLD: 6.6 % (ref 0–5.6)
HCT VFR BLD AUTO: 47.8 % (ref 42–52)
HDLC SERPL-MCNC: 67 MG/DL
HGB BLD-MCNC: 15.9 G/DL (ref 14–18)
IMM GRANULOCYTES # BLD AUTO: 0.01 K/UL (ref 0–0.11)
IMM GRANULOCYTES NFR BLD AUTO: 0.2 % (ref 0–0.9)
LDLC SERPL CALC-MCNC: 77 MG/DL
LYMPHOCYTES # BLD AUTO: 2.08 K/UL (ref 1–4.8)
LYMPHOCYTES NFR BLD: 36.9 % (ref 22–41)
MCH RBC QN AUTO: 31.2 PG (ref 27–33)
MCHC RBC AUTO-ENTMCNC: 33.3 G/DL (ref 33.7–35.3)
MCV RBC AUTO: 93.9 FL (ref 81.4–97.8)
MICROALBUMIN UR-MCNC: <1.2 MG/DL
MICROALBUMIN/CREAT UR: NORMAL MG/G (ref 0–30)
MONOCYTES # BLD AUTO: 0.58 K/UL (ref 0–0.85)
MONOCYTES NFR BLD AUTO: 10.3 % (ref 0–13.4)
NEUTROPHILS # BLD AUTO: 2.75 K/UL (ref 1.82–7.42)
NEUTROPHILS NFR BLD: 48.7 % (ref 44–72)
NRBC # BLD AUTO: 0 K/UL
NRBC BLD-RTO: 0 /100 WBC
PLATELET # BLD AUTO: 137 K/UL (ref 164–446)
PMV BLD AUTO: 9.3 FL (ref 9–12.9)
POTASSIUM SERPL-SCNC: 4.4 MMOL/L (ref 3.6–5.5)
PROT SERPL-MCNC: 7.8 G/DL (ref 6–8.2)
RBC # BLD AUTO: 5.09 M/UL (ref 4.7–6.1)
SODIUM SERPL-SCNC: 133 MMOL/L (ref 135–145)
TRIGL SERPL-MCNC: 143 MG/DL (ref 0–149)
TSH SERPL DL<=0.005 MIU/L-ACNC: 3.4 UIU/ML (ref 0.38–5.33)
WBC # BLD AUTO: 5.6 K/UL (ref 4.8–10.8)

## 2020-11-12 PROCEDURE — 82570 ASSAY OF URINE CREATININE: CPT

## 2020-11-12 PROCEDURE — 85025 COMPLETE CBC W/AUTO DIFF WBC: CPT

## 2020-11-12 PROCEDURE — 82043 UR ALBUMIN QUANTITATIVE: CPT

## 2020-11-12 PROCEDURE — 83036 HEMOGLOBIN GLYCOSYLATED A1C: CPT

## 2020-11-12 PROCEDURE — 80053 COMPREHEN METABOLIC PANEL: CPT

## 2020-11-12 PROCEDURE — 80061 LIPID PANEL: CPT

## 2020-11-12 PROCEDURE — 84443 ASSAY THYROID STIM HORMONE: CPT

## 2020-11-12 PROCEDURE — 36415 COLL VENOUS BLD VENIPUNCTURE: CPT

## 2020-11-20 ENCOUNTER — TELEMEDICINE (OUTPATIENT)
Dept: MEDICAL GROUP | Facility: MEDICAL CENTER | Age: 65
End: 2020-11-20
Payer: COMMERCIAL

## 2020-11-20 VITALS — HEIGHT: 66 IN | WEIGHT: 298 LBS | TEMPERATURE: 97.6 F | BODY MASS INDEX: 47.89 KG/M2

## 2020-11-20 DIAGNOSIS — E78.2 MIXED HYPERLIPIDEMIA: ICD-10-CM

## 2020-11-20 DIAGNOSIS — I10 ESSENTIAL HYPERTENSION: ICD-10-CM

## 2020-11-20 DIAGNOSIS — E11.9 CONTROLLED TYPE 2 DIABETES MELLITUS WITHOUT COMPLICATION, WITH LONG-TERM CURRENT USE OF INSULIN (HCC): ICD-10-CM

## 2020-11-20 DIAGNOSIS — Z79.4 CONTROLLED TYPE 2 DIABETES MELLITUS WITHOUT COMPLICATION, WITH LONG-TERM CURRENT USE OF INSULIN (HCC): ICD-10-CM

## 2020-11-20 PROCEDURE — 99214 OFFICE O/P EST MOD 30 MIN: CPT | Mod: 95,CR | Performed by: FAMILY MEDICINE

## 2020-11-20 RX ORDER — FENOFIBRATE 54 MG/1
54 TABLET ORAL DAILY
Qty: 90 TAB | Refills: 3 | Status: SHIPPED | OUTPATIENT
Start: 2020-11-20 | End: 2021-04-06 | Stop reason: SDUPTHER

## 2020-11-20 RX ORDER — SPIRONOLACTONE 50 MG/1
TABLET, FILM COATED ORAL
Qty: 180 TAB | Refills: 3 | Status: SHIPPED | OUTPATIENT
Start: 2020-11-20 | End: 2021-04-06 | Stop reason: SDUPTHER

## 2020-11-20 RX ORDER — WARFARIN SODIUM 5 MG/1
TABLET ORAL
Qty: 180 TAB | Refills: 3 | Status: SHIPPED | OUTPATIENT
Start: 2020-11-20 | End: 2020-12-22 | Stop reason: SDUPTHER

## 2020-11-20 RX ORDER — ESTRADIOL 2 MG/1
TABLET ORAL
Qty: 225 TAB | Refills: 3 | Status: SHIPPED | OUTPATIENT
Start: 2020-11-20 | End: 2021-04-06

## 2020-11-20 RX ORDER — PRAVASTATIN SODIUM 80 MG/1
80 TABLET ORAL
Qty: 90 TAB | Refills: 3 | Status: SHIPPED | OUTPATIENT
Start: 2020-11-20 | End: 2021-04-06 | Stop reason: SDUPTHER

## 2020-11-20 RX ORDER — DULAGLUTIDE 0.75 MG/.5ML
0.5 INJECTION, SOLUTION SUBCUTANEOUS
Qty: 2 ML | Refills: 11 | Status: SHIPPED | OUTPATIENT
Start: 2020-11-20 | End: 2021-04-06 | Stop reason: SDUPTHER

## 2020-11-20 RX ORDER — LISINOPRIL 5 MG/1
5 TABLET ORAL
Qty: 90 TAB | Refills: 3 | Status: SHIPPED | OUTPATIENT
Start: 2020-11-20 | End: 2021-04-06 | Stop reason: SDUPTHER

## 2020-11-20 RX ORDER — EMPAGLIFLOZIN 10 MG/1
1 TABLET, FILM COATED ORAL DAILY
Qty: 30 TAB | Refills: 11 | Status: SHIPPED | OUTPATIENT
Start: 2020-11-20 | End: 2021-03-18 | Stop reason: SDUPTHER

## 2020-11-20 RX ORDER — INSULIN GLARGINE 100 [IU]/ML
INJECTION, SOLUTION SUBCUTANEOUS
Qty: 45 ML | Refills: 11 | Status: SHIPPED | OUTPATIENT
Start: 2020-11-20 | End: 2021-04-06 | Stop reason: SDUPTHER

## 2020-11-20 ASSESSMENT — FIBROSIS 4 INDEX: FIB4 SCORE: 4.18

## 2020-11-20 NOTE — PROGRESS NOTES
Telemedicine Visit: Established Patient     This encounter was conducted via Zoom.   Verbal consent was obtained. Patient's identity was verified.    Subjective:   CC: Diabetes  Jethro Guillen is a 65 y.o. adult presenting for evaluation and management of:    Controlled type 2 diabetes mellitus without complication, with long-term current use of insulin (HCC)  Patient is currently tolerating Lantus 22 units nightly, Jardiance 10 mg daily, Trulicity 0.75 mg weekly, Actos 30 mg daily and Metformin 1000 mg twice daily.  A1c is stable at 6.6.    HTN (hypertension)  Patient is currently tolerating lisinopril 5 mg daily, spironolactone 50 mg twice daily and metoprolol 25 mg twice daily.    Hyperlipidemia  Patient is currently tolerating pravastatin 80 mg daily and fenofibrate 54 mg daily.  Reviewed lab work with patient.       Ref. Range 11/12/2020 09:09   Cholesterol,Tot Latest Ref Range: 100 - 199 mg/dL 173   Triglycerides Latest Ref Range: 0 - 149 mg/dL 143   HDL Latest Ref Range: >=40 mg/dL 67   LDL Latest Ref Range: <100 mg/dL 77         ROS   Denies any recent fevers or chills. No chest pains or shortness of breath.     Allergies   Allergen Reactions   • Nkda [No Known Drug Allergy]        Current medicines (including changes today)  Current Outpatient Medications   Medication Sig Dispense Refill   • Empagliflozin (JARDIANCE) 10 MG Tab Take 1 Tab by mouth every day. 30 Tab 11   • Dulaglutide (TRULICITY) 0.75 MG/0.5ML Solution Pen-injector Inject 0.5 mL under the skin every 7 days. 2 mL 11   • estradiol (ESTRACE) 2 MG Tab TAKE 2 & 1/2 TABLETS BY MOUTH EVERY  Tab 3   • insulin glargine (LANTUS SOLOSTAR) 100 UNIT/ML Solution Pen-injector injection INJECT 22 UNITS SUBCUTANEOUSLY EVERY EVENING AS DIRECTED BY PHYSICIAN 45 mL 11   • lisinopril (PRINIVIL) 5 MG Tab Take 1 Tab by mouth every day. 90 Tab 3   • metformin (GLUCOPHAGE) 1000 MG tablet TAKE 1 TABLET BY MOUTH TWICE A DAY WITH MEALS 180 Tab 3   • metoprolol  (LOPRESSOR) 25 MG Tab Take 1 Tab by mouth 2 times a day. 180 Tab 3   • pravastatin (PRAVACHOL) 80 MG tablet Take 1 Tab by mouth every bedtime. 90 Tab 3   • spironolactone (ALDACTONE) 50 MG Tab TAKE 1 TABLET BY MOUTH TWICE A  Tab 3   • warfarin (COUMADIN) 5 MG Tab TAKE 2 TABLETS BY MOUTH EVERY  Tab 3   • fenofibrate (TRICOR) 54 MG tablet Take 1 Tab by mouth every day. 90 Tab 3   • warfarin (COUMADIN) 1 MG Tab Take 1 Tab by mouth every day. 90 Tab 3   • pioglitazone (ACTOS) 30 MG Tab Take 1 Tab by mouth every day. 90 Tab 3   • fluticasone (FLONASE) 50 MCG/ACT nasal spray Spray 1 Spray in nose 2 times a day. 1 Bottle 2   • aspirin (ASA) 325 MG Tab Take  by mouth.     • neomycin-polymyxin-HC (PEDIOTIC HC) 3.5-01074-0 Suspension Place 8 Drops in right ear.     • ofloxacin otic sol (FLOXIN OTIC) 0.3 % Solution Place 5 Drops in right ear.     • omeprazole (PRILOSEC) 20 MG delayed-release capsule Take  by mouth.     • docosahexanoic acid (FISH OIL) 1000 MG CAPS Take 1 Cap by mouth 2 times a day. 60 Cap 6     No current facility-administered medications for this visit.        Patient Active Problem List    Diagnosis Date Noted   • Atrial fibrillation (HCC) 01/23/2012     Priority: High   • Anticoagulation goal of INR 2 to 3 01/23/2012     Priority: High   • Morbid obesity with BMI of 45.0-49.9, adult ([facility]) 01/23/2012     Priority: High   • Myringotomy tube status 04/02/2018   • Fluid level behind tympanic membrane 04/02/2018   • ANAMIKA (obstructive sleep apnea) 08/24/2017   • Post-nasal drip 02/09/2016   • Cyst of skin 11/18/2015   • HTN (hypertension) 06/20/2014   • Fatty liver 01/28/2014   • Anticoagulated on Coumadin 08/22/2013   • Controlled type 2 diabetes mellitus without complication, with long-term current use of insulin (HCC) 04/30/2013   • Hyperlipidemia 04/30/2013   • ANAMIKA treated with BiPAP 04/30/2013   • History of hepatitis 04/30/2013   • Vitamin D deficiency 04/30/2013   • Hormonal imbalance in  "transgender patient    • OM (otitis media)        Family History   Problem Relation Age of Onset   • Hyperlipidemia Mother    • Lung Disease Brother    • Heart Disease Brother        She  has a past medical history of Atrial fibrillation ([facility]) (1/23/2012), Chronic anticoagulation (1/23/2012), Diabetes ([facility]), 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, Obesity (1/23/2012), Obstructive sleep apnea (1/23/2012), OM (otitis media), and Reflux. She also has no past medical history of Asthma or COPD (chronic obstructive pulmonary disease) ([facility]).  She  has a past surgical history that includes ear middle exploration and pr esophagoscopy,dilation,<30mm.       Objective:   Vitals obtained by patient:  Temp 36.4 °C (97.6 °F) (Temporal)   Ht 1.676 m (5' 6\")   Wt (!) 135.2 kg (298 lb)     Physical Exam:  Constitutional: Alert, no distress, well-groomed.  Skin: No rashes in visible areas.  Eye: Round. Conjunctiva clear, lids normal. No icterus.   ENMT: Lips pink without lesions, good dentition, moist mucous membranes. Phonation normal.  Neck: No masses, no thyromegaly. Moves freely without pain.  CV: Pulse as reported by patient  Respiratory: Unlabored respiratory effort, no cough or audible wheeze  Psych: Alert and oriented x3, normal affect and mood.       Assessment and Plan:   The following treatment plan was discussed:     1. Controlled type 2 diabetes mellitus without complication, with long-term current use of insulin ([facility])  Controlled.  Continue current medications.  Follow-up in 6 months for recheck.  - Empagliflozin (JARDIANCE) 10 MG Tab; Take 1 Tab by mouth every day.  Dispense: 30 Tab; Refill: 11  - Dulaglutide (TRULICITY) 0.75 MG/0.5ML Solution Pen-injector; Inject 0.5 mL under the skin every 7 days.  Dispense: 2 mL; Refill: 11  - insulin glargine (LANTUS SOLOSTAR) 100 UNIT/ML Solution Pen-injector injection; INJECT 22 UNITS SUBCUTANEOUSLY EVERY EVENING AS DIRECTED BY PHYSICIAN  Dispense: 45 mL; Refill: 11  - " metformin (GLUCOPHAGE) 1000 MG tablet; TAKE 1 TABLET BY MOUTH TWICE A DAY WITH MEALS  Dispense: 180 Tab; Refill: 3    2. Essential hypertension  Controlled.  Continue current medications.  - lisinopril (PRINIVIL) 5 MG Tab; Take 1 Tab by mouth every day.  Dispense: 90 Tab; Refill: 3  - metoprolol (LOPRESSOR) 25 MG Tab; Take 1 Tab by mouth 2 times a day.  Dispense: 180 Tab; Refill: 3  - spironolactone (ALDACTONE) 50 MG Tab; TAKE 1 TABLET BY MOUTH TWICE A DAY  Dispense: 180 Tab; Refill: 3    3. Mixed hyperlipidemia  Controlled.  Continue pravastatin and fenofibrate.  - pravastatin (PRAVACHOL) 80 MG tablet; Take 1 Tab by mouth every bedtime.  Dispense: 90 Tab; Refill: 3  - fenofibrate (TRICOR) 54 MG tablet; Take 1 Tab by mouth every day.  Dispense: 90 Tab; Refill: 3    4. 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  - estradiol (ESTRACE) 2 MG Tab; TAKE 2 & 1/2 TABLETS BY MOUTH EVERY DAY  Dispense: 225 Tab; Refill: 3        Follow-up: Return in about 6 months (around 5/20/2021) for Diabetes.

## 2020-11-20 NOTE — ASSESSMENT & PLAN NOTE
Patient is currently tolerating lisinopril 5 mg daily, spironolactone 50 mg twice daily and metoprolol 25 mg twice daily.

## 2020-11-20 NOTE — ASSESSMENT & PLAN NOTE
Patient is currently tolerating Lantus 22 units nightly, Jardiance 10 mg daily, Trulicity 0.75 mg weekly, Actos 30 mg daily and Metformin 1000 mg twice daily.  A1c is stable at 6.6.

## 2020-11-20 NOTE — ASSESSMENT & PLAN NOTE
Patient is currently tolerating pravastatin 80 mg daily and fenofibrate 54 mg daily.  Reviewed lab work with patient.       Ref. Range 11/12/2020 09:09   Cholesterol,Tot Latest Ref Range: 100 - 199 mg/dL 173   Triglycerides Latest Ref Range: 0 - 149 mg/dL 143   HDL Latest Ref Range: >=40 mg/dL 67   LDL Latest Ref Range: <100 mg/dL 77

## 2020-11-30 ENCOUNTER — TELEPHONE (OUTPATIENT)
Dept: MEDICAL GROUP | Facility: MEDICAL CENTER | Age: 65
End: 2020-11-30

## 2020-11-30 NOTE — TELEPHONE ENCOUNTER
----- Message from Marvin Barreto M.D. sent at 11/30/2020  9:09 AM PST -----  Please notify patient his INR has decreased from 2.6 down to 1.9, which is slightly less than the 2-3 range.  Please ask if she has made any changes to her regimen or changes in diet.  Please have her recheck INR in 2 weeks.  Marvin Barreto M.D.

## 2020-12-18 ENCOUNTER — TELEPHONE (OUTPATIENT)
Dept: MEDICAL GROUP | Facility: MEDICAL CENTER | Age: 65
End: 2020-12-18

## 2020-12-18 NOTE — TELEPHONE ENCOUNTER
Left a message for patient to call back at [phone].     Alejandra Agee  [facility] Assistant

## 2020-12-18 NOTE — TELEPHONE ENCOUNTER
----- Message from Marvin Barreto M.D. sent at 12/18/2020 11:04 AM PST -----  INR is in therapeutic range at 2.5. Continue current dose of Coumadin. Recheck INR in 1 month.    Marvin Barreto MD

## 2020-12-22 DIAGNOSIS — I48.20 CHRONIC ATRIAL FIBRILLATION (HCC): ICD-10-CM

## 2020-12-22 RX ORDER — WARFARIN SODIUM 1 MG/1
1 TABLET ORAL DAILY
Qty: 90 TAB | Refills: 3 | Status: SHIPPED | OUTPATIENT
Start: 2020-12-22 | End: 2021-10-20

## 2020-12-22 RX ORDER — WARFARIN SODIUM 5 MG/1
TABLET ORAL
Qty: 180 TAB | Refills: 3 | Status: SHIPPED | OUTPATIENT
Start: 2020-12-22 | End: 2021-10-20

## 2021-03-03 DIAGNOSIS — Z23 NEED FOR VACCINATION: ICD-10-CM

## 2021-03-18 DIAGNOSIS — Z79.4 CONTROLLED TYPE 2 DIABETES MELLITUS WITHOUT COMPLICATION, WITH LONG-TERM CURRENT USE OF INSULIN (HCC): ICD-10-CM

## 2021-03-18 DIAGNOSIS — E11.9 CONTROLLED TYPE 2 DIABETES MELLITUS WITHOUT COMPLICATION, WITH LONG-TERM CURRENT USE OF INSULIN (HCC): ICD-10-CM

## 2021-03-22 RX ORDER — EMPAGLIFLOZIN 10 MG/1
1 TABLET, FILM COATED ORAL DAILY
Qty: 30 TABLET | Refills: 0 | Status: SHIPPED | OUTPATIENT
Start: 2021-03-22 | End: 2021-04-06 | Stop reason: SDUPTHER

## 2021-04-06 ENCOUNTER — TELEPHONE (OUTPATIENT)
Dept: VASCULAR LAB | Facility: MEDICAL CENTER | Age: 66
End: 2021-04-06

## 2021-04-06 ENCOUNTER — OFFICE VISIT (OUTPATIENT)
Dept: MEDICAL GROUP | Facility: MEDICAL CENTER | Age: 66
End: 2021-04-06
Payer: COMMERCIAL

## 2021-04-06 VITALS
TEMPERATURE: 97.2 F | HEIGHT: 66 IN | OXYGEN SATURATION: 95 % | HEART RATE: 89 BPM | WEIGHT: 295.42 LBS | RESPIRATION RATE: 16 BRPM | SYSTOLIC BLOOD PRESSURE: 120 MMHG | BODY MASS INDEX: 47.48 KG/M2 | DIASTOLIC BLOOD PRESSURE: 76 MMHG

## 2021-04-06 DIAGNOSIS — I48.20 CHRONIC ATRIAL FIBRILLATION (HCC): ICD-10-CM

## 2021-04-06 DIAGNOSIS — K76.0 FATTY LIVER: ICD-10-CM

## 2021-04-06 DIAGNOSIS — E55.9 VITAMIN D DEFICIENCY: ICD-10-CM

## 2021-04-06 DIAGNOSIS — R21 RASH: ICD-10-CM

## 2021-04-06 DIAGNOSIS — Z96.22 MYRINGOTOMY TUBE STATUS: ICD-10-CM

## 2021-04-06 DIAGNOSIS — Z12.11 SCREENING FOR COLON CANCER: ICD-10-CM

## 2021-04-06 DIAGNOSIS — Z79.4 CONTROLLED TYPE 2 DIABETES MELLITUS WITHOUT COMPLICATION, WITH LONG-TERM CURRENT USE OF INSULIN (HCC): ICD-10-CM

## 2021-04-06 DIAGNOSIS — E11.9 CONTROLLED TYPE 2 DIABETES MELLITUS WITHOUT COMPLICATION, WITH LONG-TERM CURRENT USE OF INSULIN (HCC): ICD-10-CM

## 2021-04-06 DIAGNOSIS — E66.01 MORBID OBESITY WITH BMI OF 45.0-49.9, ADULT (HCC): ICD-10-CM

## 2021-04-06 DIAGNOSIS — I10 ESSENTIAL HYPERTENSION: ICD-10-CM

## 2021-04-06 DIAGNOSIS — E78.2 MIXED HYPERLIPIDEMIA: ICD-10-CM

## 2021-04-06 DIAGNOSIS — Z79.01 ANTICOAGULATION GOAL OF INR 2 TO 3: ICD-10-CM

## 2021-04-06 DIAGNOSIS — Z51.81 ANTICOAGULATION GOAL OF INR 2 TO 3: ICD-10-CM

## 2021-04-06 DIAGNOSIS — Z12.5 PROSTATE CANCER SCREENING: ICD-10-CM

## 2021-04-06 PROCEDURE — 99214 OFFICE O/P EST MOD 30 MIN: CPT | Performed by: INTERNAL MEDICINE

## 2021-04-06 RX ORDER — INSULIN GLARGINE 100 [IU]/ML
INJECTION, SOLUTION SUBCUTANEOUS
Qty: 45 ML | Refills: 11 | Status: SHIPPED | OUTPATIENT
Start: 2021-04-06 | End: 2021-09-13 | Stop reason: SDUPTHER

## 2021-04-06 RX ORDER — SPIRONOLACTONE 50 MG/1
TABLET, FILM COATED ORAL
Qty: 180 TABLET | Refills: 3 | Status: SHIPPED | OUTPATIENT
Start: 2021-04-06 | End: 2021-10-08 | Stop reason: SDUPTHER

## 2021-04-06 RX ORDER — EMPAGLIFLOZIN 10 MG/1
1 TABLET, FILM COATED ORAL DAILY
Qty: 30 TABLET | Refills: 11 | Status: SHIPPED | OUTPATIENT
Start: 2021-04-06 | End: 2021-04-29 | Stop reason: SDUPTHER

## 2021-04-06 RX ORDER — LISINOPRIL 5 MG/1
5 TABLET ORAL
Qty: 90 TABLET | Refills: 3 | Status: SHIPPED | OUTPATIENT
Start: 2021-04-06 | End: 2021-10-08 | Stop reason: SDUPTHER

## 2021-04-06 RX ORDER — DULAGLUTIDE 0.75 MG/.5ML
0.5 INJECTION, SOLUTION SUBCUTANEOUS
Qty: 2 ML | Refills: 11 | Status: SHIPPED | OUTPATIENT
Start: 2021-04-06 | End: 2021-04-15 | Stop reason: SDUPTHER

## 2021-04-06 RX ORDER — FENOFIBRATE 54 MG/1
54 TABLET ORAL DAILY
Qty: 90 TABLET | Refills: 3 | Status: SHIPPED
Start: 2021-04-06 | End: 2021-10-08

## 2021-04-06 RX ORDER — PIOGLITAZONEHYDROCHLORIDE 30 MG/1
30 TABLET ORAL DAILY
Qty: 90 TABLET | Refills: 3 | Status: SHIPPED | OUTPATIENT
Start: 2021-04-06 | End: 2021-10-08 | Stop reason: SDUPTHER

## 2021-04-06 RX ORDER — PRAVASTATIN SODIUM 80 MG/1
80 TABLET ORAL
Qty: 90 TABLET | Refills: 3 | Status: SHIPPED | OUTPATIENT
Start: 2021-04-06 | End: 2021-10-08 | Stop reason: SDUPTHER

## 2021-04-06 ASSESSMENT — PATIENT HEALTH QUESTIONNAIRE - PHQ9: CLINICAL INTERPRETATION OF PHQ2 SCORE: 0

## 2021-04-06 ASSESSMENT — FIBROSIS 4 INDEX: FIB4 SCORE: 4.18

## 2021-04-06 NOTE — PROGRESS NOTES
New Patient to Establish    Reason to establish: New patient to establish    Jethro Guillen is a 65 y.o. male who presents today with the following:    CC:   Chief Complaint   Patient presents with   • Establish Care       HPI:     Anticoagulated on Coumadin  On warfarin for A fib  Referral to anticoagulation clinic      Atrial fibrillation (HCC)  Rate controlled with metoprolol  On warfarin for anticoagulation      Controlled type 2 diabetes mellitus without complication, with long-term current use of insulin (HCC)  He is on Lantus 22 units nightly, Jardiance 10 mg daily, Trulicity 0.75 mg weekly, Actos 30 mg daily and Metformin 1000 mg twice daily       Ref. Range 11/12/2020 09:09   Glycohemoglobin Latest Ref Range: 0.0 - 5.6 % 6.6 (H)         Fatty liver  Hx of fatty liver  Healthful lifestyle measures        HTN (hypertension)  Controlled on lisinopril 5 mg daily, spironolactone 50 mg twice daily and metoprolol 25 mg twice daily.      Hyperlipidemia  Stable on pravastatin 80 mg daily and fenofibrate 54 mg daily.      Morbid obesity with BMI of 45.0-49.9, adult ([facility])  Body mass index is 47.2 kg/m².  Healthful lifestyle measures        Myringotomy tube status  Following with ENT      Vitamin D deficiency  He has not been taking vit D supplement  Recommend vitamin D3 2000 international units daily        Rash  Bilateral hyperpigmented patches and plaques of lower extremities          Current Outpatient Medications:   •  spironolactone (ALDACTONE) 50 MG Tab, TAKE 1 TABLET BY MOUTH TWICE A DAY, Disp: 180 tablet, Rfl: 3  •  pravastatin (PRAVACHOL) 80 MG tablet, Take 1 tablet by mouth at bedtime., Disp: 90 tablet, Rfl: 3  •  pioglitazone (ACTOS) 30 MG Tab, Take 1 tablet by mouth every day., Disp: 90 tablet, Rfl: 3  •  metoprolol tartrate (LOPRESSOR) 25 MG Tab, Take 1 tablet by mouth 2 times a day., Disp: 180 tablet, Rfl: 3  •  metformin (GLUCOPHAGE) 1000 MG tablet, TAKE 1 TABLET BY MOUTH TWICE A DAY WITH MEALS, Disp: 180  tablet, Rfl: 3  •  lisinopril (PRINIVIL) 5 MG Tab, Take 1 tablet by mouth every day., Disp: 90 tablet, Rfl: 3  •  insulin glargine (LANTUS SOLOSTAR) 100 UNIT/ML Solution Pen-injector injection, INJECT 22 UNITS SUBCUTANEOUSLY EVERY EVENING AS DIRECTED BY PHYSICIAN, Disp: 45 mL, Rfl: 11  •  fenofibrate (TRICOR) 54 MG tablet, Take 1 tablet by mouth every day., Disp: 90 tablet, Rfl: 3  •  Empagliflozin (JARDIANCE) 10 MG Tab, Take 1 tablet by mouth every day., Disp: 30 tablet, Rfl: 11  •  Dulaglutide (TRULICITY) 0.75 MG/0.5ML Solution Pen-injector, Inject 0.5 mL under the skin every 7 days., Disp: 2 mL, Rfl: 11  •  warfarin (COUMADIN) 1 MG Tab, Take 1 Tab by mouth every day., Disp: 90 Tab, Rfl: 3  •  warfarin (COUMADIN) 5 MG Tab, TAKE 2 TABLETS BY MOUTH EVERY DAY, Disp: 180 Tab, Rfl: 3  •  neomycin-polymyxin-HC (PEDIOTIC HC) 3.5-49235-8 Suspension, Place 8 Drops in right ear., Disp: , Rfl:   •  docosahexanoic acid (FISH OIL) 1000 MG CAPS, Take 1 Cap by mouth 2 times a day., Disp: 60 Cap, Rfl: 6  •  fluticasone (FLONASE) 50 MCG/ACT nasal spray, Spray 1 Spray in nose 2 times a day. (Patient not taking: Reported on 4/6/2021), Disp: 1 Bottle, Rfl: 2  •  aspirin (ASA) 325 MG Tab, Take  by mouth., Disp: , Rfl:   •  ofloxacin otic sol (FLOXIN OTIC) 0.3 % Solution, Place 5 Drops in right ear., Disp: , Rfl:     Allergies, past medical history, past surgical history, medications, family history, social history reviewed and updated.    ROS     Constitutional: Denies fevers or chills  Eyes: Denies changes in vision  Ears/Nose/Throat/Mouth: Denies nasal congestion or sore throat   Cardiovascular: Denies chest pain or palpitations   Respiratory: Denies shortness of breath , Denies cough  Gastrointestinal/Hepatic: Denies abd pain, nausea, vomiting   Genitourinary: Denies dysuria or frequency  Musculoskeletal/Rheum: Denies joint pain and swelling   Neurological: Denies headache  Psychiatric: Denies mood disorder   Endocrine: hx of  "diabetes Denies thyroid dysfunction  Heme/Oncology/Lymph Nodes: Denies weight changes or enlarged LNs.    Physical Exam  /76 (BP Location: Left arm, Patient Position: Sitting, BP Cuff Size: Adult)   Pulse 89   Temp 36.2 °C (97.2 °F) (Temporal)   Resp 16   Ht 1.685 m (5' 6.34\")   Wt (!) 134 kg (295 lb 6.7 oz)   SpO2 95%   BMI 47.20 kg/m²   General: Normal appearance.  Well developed, well nourished, no acute distress.  HEENT: Normocephalic.  Extraocular motion intact. Pupils are equally round, reactive to light and accommodation, conjunctiva clear, no scleral icterus.  Ears: normal shape and contour, ear canals clear, Myringotomy Tubes bilaterally. Wearing a mask. Oropharynx clear, no erythema, edema or exudate noted.  NECK: Thyroid is not enlarged. No JVD.  No carotid bruits. No masses.  Cardiovascular: Regular rhythm and rate.    Respiratory: Normal respiratory effort, clear to auscultation bilaterally. No wheezing/rales/rhonchi.    Abdomen: Bowel sounds present, soft, nontender, nondistended, no rebound, no guarding.   : No suprapubic tenderness. No CVA tenderness.   EXT: no LE edema b/l. No cyanosis.  No clubbing.  Lymph: No cervical, supraclavicular or axillary lymph nodes are palpable  Skin: Warm and dry.  Bilateral hyperpigmented patches and plaques of lower extremities. Excoriation of the left arm  Psych: AAOx3,  Normal mood and affect, normal judgment and insight, memory within normal limits        Assessment and Plan    1. Anticoagulation goal of INR 2 to 3  2. Chronic atrial fibrillation (HCC)  - REFERRAL TO ANTICOAGULATION MONITORING  Rate controlled with metoprolol  On warfarin for anticoagulation    3. Essential hypertension  - spironolactone (ALDACTONE) 50 MG Tab; TAKE 1 TABLET BY MOUTH TWICE A DAY  Dispense: 180 tablet; Refill: 3  - metoprolol tartrate (LOPRESSOR) 25 MG Tab; Take 1 tablet by mouth 2 times a day.  Dispense: 180 tablet; Refill: 3  - lisinopril (PRINIVIL) 5 MG Tab; Take 1 " tablet by mouth every day.  Dispense: 90 tablet; Refill: 3  - Lipid Profile; Future  - CBC WITH DIFFERENTIAL; Future    4. Mixed hyperlipidemia  - pravastatin (PRAVACHOL) 80 MG tablet; Take 1 tablet by mouth at bedtime.  Dispense: 90 tablet; Refill: 3  - fenofibrate (TRICOR) 54 MG tablet; Take 1 tablet by mouth every day.  Dispense: 90 tablet; Refill: 3    5. Controlled type 2 diabetes mellitus without complication, with long-term current use of insulin (HCC)  - pioglitazone (ACTOS) 30 MG Tab; Take 1 tablet by mouth every day.  Dispense: 90 tablet; Refill: 3  - metformin (GLUCOPHAGE) 1000 MG tablet; TAKE 1 TABLET BY MOUTH TWICE A DAY WITH MEALS  Dispense: 180 tablet; Refill: 3  - insulin glargine (LANTUS SOLOSTAR) 100 UNIT/ML Solution Pen-injector injection; INJECT 22 UNITS SUBCUTANEOUSLY EVERY EVENING AS DIRECTED BY PHYSICIAN  Dispense: 45 mL; Refill: 11  - Empagliflozin (JARDIANCE) 10 MG Tab; Take 1 tablet by mouth every day.  Dispense: 30 tablet; Refill: 11  - Dulaglutide (TRULICITY) 0.75 MG/0.5ML Solution Pen-injector; Inject 0.5 mL under the skin every 7 days.  Dispense: 2 mL; Refill: 11  - HEMOGLOBIN A1C; Future  - MICROALBUMIN CREAT RATIO URINE; Future  - Comp Metabolic Panel; Future    6. Prostate cancer screening  - PROSTATE SPECIFIC AG SCREENING; Future    7. Screening for colon cancer  - REFERRAL TO GI FOR COLONOSCOPY    8. Rash  possible stasis dermatitis  Moisturizers  Topical hydrocortisone prn  Compression stocking  - REFERRAL TO DERMATOLOGY    9. Vitamin D deficiency  - VITAMIN D,25 HYDROXY; Future  Replacement    10. Fatty liver  Healthful lifestyle measures    11. Morbid obesity with BMI of 45.0-49.9, adult ([facility])  Healthful lifestyle measures    12. Myringotomy tube status  Follow with ENT      Follow-up:Return in about 6 weeks (around 5/18/2021), or if symptoms worsen or fail to improve.    This note was created using voice recognition software. There may be unintended errors in spelling, grammar  or content.

## 2021-04-06 NOTE — TELEPHONE ENCOUNTER
[facility]      Called and left msg for pt to call back to establish care regarding anticoagulation referral for warfarin due to Hx of AF  from Dr. Solorio on 4/6/21.      Insurance: [facility]  PCP: Patsy = Dr. Solorio  Locations to be seen: any     Phone number left for return call or any questions or concerns.  [facility] at [phone], fax [phone]        Davion JonesD

## 2021-04-06 NOTE — ASSESSMENT & PLAN NOTE
Controlled on lisinopril 5 mg daily, spironolactone 50 mg twice daily and metoprolol 25 mg twice daily.

## 2021-04-06 NOTE — ASSESSMENT & PLAN NOTE
He is on Lantus 22 units nightly, Jardiance 10 mg daily, Trulicity 0.75 mg weekly, Actos 30 mg daily and Metformin 1000 mg twice daily       Ref. Range 11/12/2020 09:09   Glycohemoglobin Latest Ref Range: 0.0 - 5.6 % 6.6 (H)

## 2021-04-09 ENCOUNTER — TELEPHONE (OUTPATIENT)
Dept: VASCULAR LAB | Facility: MEDICAL CENTER | Age: 66
End: 2021-04-09

## 2021-04-09 ENCOUNTER — ANTICOAGULATION MONITORING (OUTPATIENT)
Dept: VASCULAR LAB | Facility: MEDICAL CENTER | Age: 66
End: 2021-04-09

## 2021-04-09 NOTE — TELEPHONE ENCOUNTER
Spoke with patient who reports that he has an [employer] home monitor and was previously managed by Dr. Barreto.  Asked pt to test INR today, and we will send in new INR application to [employer] so that we may receive his results.  Eunice Bell, Clinical Pharmacist, CDE, CACP

## 2021-04-14 ENCOUNTER — TELEPHONE (OUTPATIENT)
Dept: VASCULAR LAB | Facility: MEDICAL CENTER | Age: 66
End: 2021-04-14

## 2021-04-14 ENCOUNTER — PATIENT MESSAGE (OUTPATIENT)
Dept: MEDICAL GROUP | Facility: MEDICAL CENTER | Age: 66
End: 2021-04-14

## 2021-04-14 DIAGNOSIS — E11.9 CONTROLLED TYPE 2 DIABETES MELLITUS WITHOUT COMPLICATION, WITH LONG-TERM CURRENT USE OF INSULIN (HCC): ICD-10-CM

## 2021-04-14 DIAGNOSIS — Z79.4 CONTROLLED TYPE 2 DIABETES MELLITUS WITHOUT COMPLICATION, WITH LONG-TERM CURRENT USE OF INSULIN (HCC): ICD-10-CM

## 2021-04-14 NOTE — TELEPHONE ENCOUNTER
[facility] [place] [facility]    Called pt to remind him to have INR checked. He states he will have it drawn today - will follow up to with results along with assisting with [employer] application. Forwarding to MANISHA Valentin, PharmD, MSPH

## 2021-04-15 RX ORDER — DULAGLUTIDE 0.75 MG/.5ML
0.5 INJECTION, SOLUTION SUBCUTANEOUS
Qty: 2 ML | Refills: 11 | Status: SHIPPED | OUTPATIENT
Start: 2021-04-15 | End: 2021-10-08 | Stop reason: SDUPTHER

## 2021-04-15 NOTE — PATIENT COMMUNICATION
Received request via: Patient    Was the patient seen in the last year in this department? Yes    Does the patient have an active prescription (recently filled or refills available) for medication(s) requested? No     Requested Prescriptions     Pending Prescriptions Disp Refills   • Dulaglutide (TRULICITY) 0.75 MG/0.5ML Solution Pen-injector 2 mL 11     Sig: Inject 0.5 mL under the skin every 7 days.

## 2021-04-16 ENCOUNTER — TELEPHONE (OUTPATIENT)
Dept: VASCULAR LAB | Facility: MEDICAL CENTER | Age: 66
End: 2021-04-16

## 2021-04-16 NOTE — TELEPHONE ENCOUNTER
[facility] [place] [facility]    Called the patient to follow up on his INR results as we have not received any.  Patient reports that he had forgotten and will do it when he gets home today.         Davion JonesD

## 2021-04-17 LAB — INR PPP: 1.8 (ref 2–3.5)

## 2021-04-19 ENCOUNTER — ANTICOAGULATION MONITORING (OUTPATIENT)
Dept: VASCULAR LAB | Facility: MEDICAL CENTER | Age: 66
End: 2021-04-19

## 2021-04-19 DIAGNOSIS — Z79.01 WARFARIN ANTICOAGULATION: ICD-10-CM

## 2021-04-19 DIAGNOSIS — I48.91 ATRIAL FIBRILLATION, UNSPECIFIED TYPE (HCC): ICD-10-CM

## 2021-04-20 NOTE — PROGRESS NOTES
4/20-421pm,  [facility] to please call us.  We need to verify what dose he is taking.      Issa Sahu, Karlos, MS, BCACP, [facility]  Phone: [phone], Fax: [phone]    --------------------------------------------------------------------------------------------------------------------------      Unable to reach pt by phone, will need to try again tomorrow.    Yemi Lutz, RobertD

## 2021-04-27 ENCOUNTER — ANTICOAGULATION MONITORING (OUTPATIENT)
Dept: VASCULAR LAB | Facility: MEDICAL CENTER | Age: 66
End: 2021-04-27

## 2021-04-27 DIAGNOSIS — I48.91 ATRIAL FIBRILLATION, UNSPECIFIED TYPE (HCC): ICD-10-CM

## 2021-04-27 DIAGNOSIS — Z79.01 WARFARIN ANTICOAGULATION: ICD-10-CM

## 2021-04-27 NOTE — PROGRESS NOTES
I Left a voice message with the patient to call the [facility]/Anticoagulation [facility] to establish care     Jethro Guillen  [address]  [place] [address]    PCP:  Marlon Solorio M.D.  [address]  [place] [id number] [phone]    Issa Sahu, PharmD, MS, BCACP, LCC    [facility]  Phone [phone] fax [phone]    This note was created using voice recognition software (Dragon). The accuracy of the dictation is limited by the abilities of the software. I have reviewed the note prior to signing, however some errors in grammar and context are still possible. If you have any questions related to this note please do not hesitate to contact our office.

## 2021-04-27 NOTE — Clinical Note
Blaine Baum, this pt has a HM via "[employer]. He is new to our clinic but not new to warfarin. He was previously managed by his old PCP. Can you contact [facility] and change the provider to one of our Vascular providers so that our clinic receives the INR result? TY

## 2021-04-27 NOTE — PROGRESS NOTES
.  Anticoagulation Summary  As of 4/27/2021    INR goal:  2.0-3.0   TTR:  --   Prior goal:  2.5-3.0   INR used for dosing:  No new INR was available at the time of this encounter.   Warfarin maintenance plan:  11 mg (5 mg x 2 and 1 mg x 1) every day   Weekly warfarin total:  77 mg   Plan last modified:  Savanna Vigil, PharmD (4/27/2021)   Next INR check:  4/29/2021   Target end date:  Indefinite    Indications    Warfarin anticoagulation [Z79.01]  Atrial fibrillation (HCC) [I48.91]             Anticoagulation Episode Summary     INR check location:      Preferred lab:      Send INR reminders to:      Comments:  [facility]      Anticoagulation Care Providers     Provider Role Specialty Phone number    Marlon Solorio M.D. Referring Internal Medicine [phone]        Anticoagulation Patient Findings      PCP Marlon Solorio M.D.    Cardiologist - will ask at next INR check    Pt hx - Pt has been on warfarin for 11 years for atrial fibrillation. He was previously managed by his PCP and is transferring management to our clinic.    CHADSVASC = 3  HAS-BLED = 1  DOAC = pt's weight is >120kg, therefore warfarin is preferred  Target end date = indefinite    Pt is NOT new to warfarin and new to [facility]. Discussed:   · Indication for warfarin therapy and INR goal range.   · Importance of monitoring and compliance.   · Monitoring parameters, signs and symptoms of bleeding or clotting.  · Warfarin therapy, side effects, potential DDIs, OTC medications  · Importance of diet consistency, ie vitamin K intake, supplements  · Lifestyle safety, ie smoking, ETOH, hobby safety, fall safety/prevention  · Procedures for missed doses or suspected missed doses, surgeries/procedures, travel, dental work, any medication changes    Pt denies any unusual s/s of bleeding, bruising, clotting or any changes to diet or medications.    INR was subtherapeutic on 4/17.   Pt's been taking 11 mg daily     Will have pt continue warfarin  regimen  Will ask Sarika to contact [facility] to switch the provider on his HM huy to Vascular provider so that our clinic receives the INR results.    Recheck INR in 2 days.    Savanna Vigil, PharmD     ADDENDUM 5/11/21: Changed INR goal to 2.0 to 3.0

## 2021-04-30 ENCOUNTER — TELEPHONE (OUTPATIENT)
Dept: VASCULAR LAB | Facility: MEDICAL CENTER | Age: 66
End: 2021-04-30

## 2021-05-03 ENCOUNTER — TELEPHONE (OUTPATIENT)
Dept: VASCULAR LAB | Facility: MEDICAL CENTER | Age: 66
End: 2021-05-03

## 2021-05-03 NOTE — TELEPHONE ENCOUNTER
[facility] [place] [facility]      Pt is over due for PT/INR for warfarin monitoring. Left message for patient to have INR checked ASAP.   Clinic phone number left for any questions or concerns.    Davion Hancock  PharmD

## 2021-05-11 ENCOUNTER — ANTICOAGULATION MONITORING (OUTPATIENT)
Dept: CARDIOLOGY | Facility: MEDICAL CENTER | Age: 66
End: 2021-05-11

## 2021-05-11 DIAGNOSIS — Z79.01 WARFARIN ANTICOAGULATION: ICD-10-CM

## 2021-05-11 DIAGNOSIS — I48.91 ATRIAL FIBRILLATION, UNSPECIFIED TYPE (HCC): ICD-10-CM

## 2021-05-11 LAB — INR PPP: 1.9 (ref 2–3.5)

## 2021-05-11 NOTE — PROGRESS NOTES
OP   Telephone Anticoagulation Service Note      Anticoagulation Summary  As of 2021    INR goal:  2.0-3.0   TTR:  0.0 % (1.7 wk)   INR used for dosin.90 (2021)   Warfarin maintenance plan:  11 mg (5 mg x 2 and 1 mg x 1) every day   Weekly warfarin total:  77 mg   Plan last modified:  Savanna Vigil PharmD (2021)   Next INR check:  2021   Target end date:  Indefinite    Indications    Warfarin anticoagulation [Z79.01]  Atrial fibrillation (HCC) [I48.91]             Anticoagulation Episode Summary     INR check location:      Preferred lab:      Send INR reminders to:      Comments:  Alere      Anticoagulation Care Providers     Provider Role Specialty Phone number    Marlon Solorio M.D. Referring Internal Medicine [phone]        Anticoagulation Patient Findings     Left a message on the patient's voicemail today, informing the patient of a SUB-therapeutic INR of 1.9.   Unable to confirm the current dosing regimen or any interval changes, but Instructed the patient to call the clinic with any changes to diet or medications, with any signs/sx of bleeding or clotting or with any questions or concerns.     Patient instructed to bolus with 12mg TONIGHT ONLY, as a one time boost dose, then to resume his current dosing regimen.   Patient asked to retest again in 1 week.     Davion JonesD

## 2021-05-20 ENCOUNTER — APPOINTMENT (OUTPATIENT)
Dept: MEDICAL GROUP | Facility: MEDICAL CENTER | Age: 66
End: 2021-05-20
Payer: COMMERCIAL

## 2021-05-28 ENCOUNTER — ANTICOAGULATION MONITORING (OUTPATIENT)
Dept: MEDICAL GROUP | Facility: PHYSICIAN GROUP | Age: 66
End: 2021-05-28

## 2021-05-28 DIAGNOSIS — I48.0 PAROXYSMAL ATRIAL FIBRILLATION (HCC): ICD-10-CM

## 2021-05-28 DIAGNOSIS — Z79.01 WARFARIN ANTICOAGULATION: ICD-10-CM

## 2021-05-28 LAB — INR PPP: 2.2 (ref 2–3.5)

## 2021-05-28 NOTE — PROGRESS NOTES
Anticoagulation Summary  As of 2021    INR goal:  2.0-3.0   TTR:  39.1 % (4.1 wk)   INR used for dosin.20 (2021)   Warfarin maintenance plan:  11 mg (5 mg x 2 and 1 mg x 1) every day   Weekly warfarin total:  77 mg   Plan last modified:  Savanna Vigil, PharmD (2021)   Next INR check:  2021   Target end date:  Indefinite    Indications    Warfarin anticoagulation [Z79.01]  Atrial fibrillation (HCC) [I48.91]             Anticoagulation Episode Summary     INR check location:      Preferred lab:      Send INR reminders to:      Comments:  Armin      Anticoagulation Care Providers     Provider Role Specialty Phone number    Vangieosmel Kye Solorio M.D. Referring Internal Medicine [phone]        Anticoagulation Patient Findings  Patient Findings     Negatives:  Signs/symptoms of thrombosis, Signs/symptoms of bleeding, Laboratory test error suspected, Change in health, Change in alcohol use, Change in activity, Upcoming invasive procedure, Emergency department visit, Upcoming dental procedure, Missed doses, Extra doses, Change in medications, Change in diet/appetite, Hospital admission, Bruising, Other complaints              Spoke with patient today regarding therapeutic INR of 2.2.  Patient denies any signs/symptoms of bruising or bleeding or any changes in diet and medications.  Instructed patient to call clinic with any questions or concerns.    Pt is to continue with current warfarin dosing regimen.    Follow up in 2 weeks, to reduce risk of adverse events related to this high risk medication,  Warfarin.    Jaison Carl, Pharmacy Intern

## 2021-06-04 ENCOUNTER — HOSPITAL ENCOUNTER (OUTPATIENT)
Dept: LAB | Facility: MEDICAL CENTER | Age: 66
End: 2021-06-04
Attending: INTERNAL MEDICINE
Payer: COMMERCIAL

## 2021-06-04 DIAGNOSIS — E55.9 VITAMIN D DEFICIENCY: ICD-10-CM

## 2021-06-04 DIAGNOSIS — Z12.5 PROSTATE CANCER SCREENING: ICD-10-CM

## 2021-06-04 DIAGNOSIS — Z79.4 CONTROLLED TYPE 2 DIABETES MELLITUS WITHOUT COMPLICATION, WITH LONG-TERM CURRENT USE OF INSULIN (HCC): ICD-10-CM

## 2021-06-04 DIAGNOSIS — I10 ESSENTIAL HYPERTENSION: ICD-10-CM

## 2021-06-04 DIAGNOSIS — E11.9 CONTROLLED TYPE 2 DIABETES MELLITUS WITHOUT COMPLICATION, WITH LONG-TERM CURRENT USE OF INSULIN (HCC): ICD-10-CM

## 2021-06-04 LAB
ALBUMIN SERPL BCP-MCNC: 3.7 G/DL (ref 3.2–4.9)
ALBUMIN/GLOB SERPL: 1 G/DL
ALP SERPL-CCNC: 85 U/L (ref 30–99)
ALT SERPL-CCNC: 39 U/L (ref 2–50)
ANION GAP SERPL CALC-SCNC: 11 MMOL/L (ref 7–16)
AST SERPL-CCNC: 45 U/L (ref 12–45)
BASOPHILS # BLD AUTO: 0.4 % (ref 0–1.8)
BASOPHILS # BLD: 0.02 K/UL (ref 0–0.12)
BILIRUB SERPL-MCNC: 0.6 MG/DL (ref 0.1–1.5)
BUN SERPL-MCNC: 17 MG/DL (ref 8–22)
CALCIUM SERPL-MCNC: 9.2 MG/DL (ref 8.4–10.2)
CHLORIDE SERPL-SCNC: 106 MMOL/L (ref 96–112)
CHOLEST SERPL-MCNC: 168 MG/DL (ref 100–199)
CO2 SERPL-SCNC: 18 MMOL/L (ref 20–33)
CREAT SERPL-MCNC: 0.92 MG/DL (ref 0.5–1.4)
CREAT UR-MCNC: 71.77 MG/DL
EOSINOPHIL # BLD AUTO: 0.2 K/UL (ref 0–0.51)
EOSINOPHIL NFR BLD: 4.1 % (ref 0–6.9)
ERYTHROCYTE [DISTWIDTH] IN BLOOD BY AUTOMATED COUNT: 55.5 FL (ref 35.9–50)
FASTING STATUS PATIENT QL REPORTED: NORMAL
GLOBULIN SER CALC-MCNC: 3.6 G/DL (ref 1.9–3.5)
GLUCOSE SERPL-MCNC: 113 MG/DL (ref 65–99)
HCT VFR BLD AUTO: 46.7 % (ref 42–52)
HDLC SERPL-MCNC: 56 MG/DL
HGB BLD-MCNC: 15.5 G/DL (ref 14–18)
IMM GRANULOCYTES # BLD AUTO: 0 K/UL (ref 0–0.11)
IMM GRANULOCYTES NFR BLD AUTO: 0 % (ref 0–0.9)
LDLC SERPL CALC-MCNC: 83 MG/DL
LYMPHOCYTES # BLD AUTO: 1.91 K/UL (ref 1–4.8)
LYMPHOCYTES NFR BLD: 39.5 % (ref 22–41)
MCH RBC QN AUTO: 31.5 PG (ref 27–33)
MCHC RBC AUTO-ENTMCNC: 33.2 G/DL (ref 33.7–35.3)
MCV RBC AUTO: 94.9 FL (ref 81.4–97.8)
MICROALBUMIN UR-MCNC: <1.2 MG/DL
MICROALBUMIN/CREAT UR: NORMAL MG/G (ref 0–30)
MONOCYTES # BLD AUTO: 0.57 K/UL (ref 0–0.85)
MONOCYTES NFR BLD AUTO: 11.8 % (ref 0–13.4)
NEUTROPHILS # BLD AUTO: 2.13 K/UL (ref 1.82–7.42)
NEUTROPHILS NFR BLD: 44.2 % (ref 44–72)
NRBC # BLD AUTO: 0 K/UL
NRBC BLD-RTO: 0 /100 WBC
PLATELET # BLD AUTO: 138 K/UL (ref 164–446)
PMV BLD AUTO: 9.9 FL (ref 9–12.9)
POTASSIUM SERPL-SCNC: 4.5 MMOL/L (ref 3.6–5.5)
PROT SERPL-MCNC: 7.3 G/DL (ref 6–8.2)
PSA SERPL-MCNC: 0.09 NG/ML (ref 0–4)
RBC # BLD AUTO: 4.92 M/UL (ref 4.7–6.1)
SODIUM SERPL-SCNC: 135 MMOL/L (ref 135–145)
TRIGL SERPL-MCNC: 144 MG/DL (ref 0–149)
WBC # BLD AUTO: 4.8 K/UL (ref 4.8–10.8)

## 2021-06-04 PROCEDURE — 83036 HEMOGLOBIN GLYCOSYLATED A1C: CPT

## 2021-06-04 PROCEDURE — 80053 COMPREHEN METABOLIC PANEL: CPT

## 2021-06-04 PROCEDURE — 82570 ASSAY OF URINE CREATININE: CPT

## 2021-06-04 PROCEDURE — 36415 COLL VENOUS BLD VENIPUNCTURE: CPT

## 2021-06-04 PROCEDURE — 84153 ASSAY OF PSA TOTAL: CPT

## 2021-06-04 PROCEDURE — 82043 UR ALBUMIN QUANTITATIVE: CPT

## 2021-06-04 PROCEDURE — 82306 VITAMIN D 25 HYDROXY: CPT

## 2021-06-04 PROCEDURE — 85025 COMPLETE CBC W/AUTO DIFF WBC: CPT

## 2021-06-04 PROCEDURE — 80061 LIPID PANEL: CPT

## 2021-06-05 LAB
EST. AVERAGE GLUCOSE BLD GHB EST-MCNC: 134 MG/DL
HBA1C MFR BLD: 6.3 % (ref 4–5.6)

## 2021-06-06 LAB — 25(OH)D3 SERPL-MCNC: 25 NG/ML (ref 30–80)

## 2021-06-18 ENCOUNTER — OFFICE VISIT (OUTPATIENT)
Dept: MEDICAL GROUP | Facility: MEDICAL CENTER | Age: 66
End: 2021-06-18
Payer: COMMERCIAL

## 2021-06-18 VITALS
OXYGEN SATURATION: 94 % | BODY MASS INDEX: 45.67 KG/M2 | HEART RATE: 70 BPM | RESPIRATION RATE: 16 BRPM | HEIGHT: 67 IN | DIASTOLIC BLOOD PRESSURE: 58 MMHG | TEMPERATURE: 96.9 F | WEIGHT: 291.01 LBS | SYSTOLIC BLOOD PRESSURE: 97 MMHG

## 2021-06-18 DIAGNOSIS — E11.9 CONTROLLED TYPE 2 DIABETES MELLITUS WITHOUT COMPLICATION, WITH LONG-TERM CURRENT USE OF INSULIN (HCC): ICD-10-CM

## 2021-06-18 DIAGNOSIS — D69.6 THROMBOCYTOPENIA (HCC): ICD-10-CM

## 2021-06-18 DIAGNOSIS — I10 ESSENTIAL HYPERTENSION: ICD-10-CM

## 2021-06-18 DIAGNOSIS — E66.01 MORBID OBESITY WITH BMI OF 45.0-49.9, ADULT (HCC): ICD-10-CM

## 2021-06-18 DIAGNOSIS — E55.9 VITAMIN D DEFICIENCY: ICD-10-CM

## 2021-06-18 DIAGNOSIS — Z79.4 CONTROLLED TYPE 2 DIABETES MELLITUS WITHOUT COMPLICATION, WITH LONG-TERM CURRENT USE OF INSULIN (HCC): ICD-10-CM

## 2021-06-18 PROBLEM — K22.70 BARRETT'S ESOPHAGUS: Status: ACTIVE | Noted: 2021-06-18

## 2021-06-18 PROBLEM — J30.9 ALLERGIC RHINITIS: Status: ACTIVE | Noted: 2021-06-18

## 2021-06-18 PROCEDURE — 99214 OFFICE O/P EST MOD 30 MIN: CPT | Performed by: INTERNAL MEDICINE

## 2021-06-18 ASSESSMENT — FIBROSIS 4 INDEX: FIB4 SCORE: 3.5

## 2021-06-18 NOTE — ASSESSMENT & PLAN NOTE
Denies alcohol use  Denies herbal use  Not on offending agents that lowers PLT   Ref. Range 11/12/2020 09:09 4/17/2021 00:00 5/11/2021 00:00 5/28/2021 00:00 6/4/2021 11:34   WBC Latest Ref Range: 4.8 - 10.8 K/uL 5.6    4.8   RBC Latest Ref Range: 4.70 - 6.10 M/uL 5.09    4.92   Hemoglobin Latest Ref Range: 14.0 - 18.0 g/dL 15.9    15.5   Hematocrit Latest Ref Range: 42.0 - 52.0 % 47.8    46.7   MCV Latest Ref Range: 81.4 - 97.8 fL 93.9    94.9   MCH Latest Ref Range: 27.0 - 33.0 pg 31.2    31.5   MCHC Latest Ref Range: 33.7 - 35.3 g/dL 33.3 (L)    33.2 (L)   RDW Latest Ref Range: 35.9 - 50.0 fL 53.2 (H)    55.5 (H)   Platelet Count Latest Ref Range: 164 - 446 K/uL 137 (L)    138 (L)   MPV Latest Ref Range: 9.0 - 12.9 fL 9.3    9.9   Neutrophils-Polys Latest Ref Range: 44.00 - 72.00 % 48.70    44.20   Neutrophils (Absolute) Latest Ref Range: 1.82 - 7.42 K/uL 2.75    2.13   Lymphocytes Latest Ref Range: 22.00 - 41.00 % 36.90    39.50   Lymphs (Absolute) Latest Ref Range: 1.00 - 4.80 K/uL 2.08    1.91   Monocytes Latest Ref Range: 0.00 - 13.40 % 10.30    11.80   Monos (Absolute) Latest Ref Range: 0.00 - 0.85 K/uL 0.58    0.57   Eosinophils Latest Ref Range: 0.00 - 6.90 % 3.40    4.10   Eos (Absolute) Latest Ref Range: 0.00 - 0.51 K/uL 0.19    0.20   Basophils Latest Ref Range: 0.00 - 1.80 % 0.50    0.40   Baso (Absolute) Latest Ref Range: 0.00 - 0.12 K/uL 0.03    0.02   Immature Granulocytes Latest Ref Range: 0.00 - 0.90 % 0.20    0.00   Immature Granulocytes (abs) Latest Ref Range: 0.00 - 0.11 K/uL 0.01    0.00   Nucleated RBC Latest Units: /100 WBC 0.00    0.00   NRBC (Absolute) Latest Units: K/uL 0.00    0.00

## 2021-06-18 NOTE — PROGRESS NOTES
Established Patient    Jethro Guillen is a 65 y.o. male who presents today with the following:    CC:   Chief Complaint   Patient presents with   • Follow-Up diabetes   • Lab Results       HPI:     Controlled type 2 diabetes mellitus without complication, with long-term current use of insulin (HCC)  He is on Lantus 22 units nightly, Jardiance 10 mg daily, Trulicity 0.75 mg weekly, Actos 30 mg daily and Metformin 1000 mg twice daily     Ref. Range 6/4/2021 11:34   Glycohemoglobin Latest Ref Range: 4.0 - 5.6 % 6.3 (H)   Estim. Avg Glu Latest Units: mg/dL 134        Ref. Range 11/12/2020 09:09   Glycohemoglobin Latest Ref Range: 0.0 - 5.6 % 6.6 (H)   6/18/21 Monofilament testing with a 10 gram force: sensation intact: intact bilaterally  Visual Inspection: Feet without maceration, ulcers, fissures. Dry feet, on moisturizers  Pedal pulses: intact bilaterally        Thrombocytopenia (HCC)  Denies alcohol use  Denies herbal use  Not on offending agents that lowers PLT   Ref. Range 11/12/2020 09:09 4/17/2021 00:00 5/11/2021 00:00 5/28/2021 00:00 6/4/2021 11:34   WBC Latest Ref Range: 4.8 - 10.8 K/uL 5.6    4.8   RBC Latest Ref Range: 4.70 - 6.10 M/uL 5.09    4.92   Hemoglobin Latest Ref Range: 14.0 - 18.0 g/dL 15.9    15.5   Hematocrit Latest Ref Range: 42.0 - 52.0 % 47.8    46.7   MCV Latest Ref Range: 81.4 - 97.8 fL 93.9    94.9   MCH Latest Ref Range: 27.0 - 33.0 pg 31.2    31.5   MCHC Latest Ref Range: 33.7 - 35.3 g/dL 33.3 (L)    33.2 (L)   RDW Latest Ref Range: 35.9 - 50.0 fL 53.2 (H)    55.5 (H)   Platelet Count Latest Ref Range: 164 - 446 K/uL 137 (L)    138 (L)   MPV Latest Ref Range: 9.0 - 12.9 fL 9.3    9.9   Neutrophils-Polys Latest Ref Range: 44.00 - 72.00 % 48.70    44.20   Neutrophils (Absolute) Latest Ref Range: 1.82 - 7.42 K/uL 2.75    2.13   Lymphocytes Latest Ref Range: 22.00 - 41.00 % 36.90    39.50   Lymphs (Absolute) Latest Ref Range: 1.00 - 4.80 K/uL 2.08    1.91   Monocytes Latest Ref Range: 0.00 -  13.40 % 10.30    11.80   Monos (Absolute) Latest Ref Range: 0.00 - 0.85 K/uL 0.58    0.57   Eosinophils Latest Ref Range: 0.00 - 6.90 % 3.40    4.10   Eos (Absolute) Latest Ref Range: 0.00 - 0.51 K/uL 0.19    0.20   Basophils Latest Ref Range: 0.00 - 1.80 % 0.50    0.40   Baso (Absolute) Latest Ref Range: 0.00 - 0.12 K/uL 0.03    0.02   Immature Granulocytes Latest Ref Range: 0.00 - 0.90 % 0.20    0.00   Immature Granulocytes (abs) Latest Ref Range: 0.00 - 0.11 K/uL 0.01    0.00   Nucleated RBC Latest Units: /100 WBC 0.00    0.00   NRBC (Absolute) Latest Units: K/uL 0.00    0.00         Vitamin D deficiency  He has not been taking vit D supplement  Recommend vitamin D3 2000 international units daily        HTN (hypertension)  Controlled on lisinopril 5 mg daily, spironolactone 50 mg twice daily and metoprolol 25 mg twice daily.        Current Outpatient Medications   Medication Sig Dispense Refill   • Empagliflozin (JARDIANCE) 10 MG Tab Take 1 tablet by mouth every day. 90 tablet 3   • Dulaglutide (TRULICITY) 0.75 MG/0.5ML Solution Pen-injector Inject 0.5 mL under the skin every 7 days. 2 mL 11   • spironolactone (ALDACTONE) 50 MG Tab TAKE 1 TABLET BY MOUTH TWICE A  tablet 3   • pravastatin (PRAVACHOL) 80 MG tablet Take 1 tablet by mouth at bedtime. 90 tablet 3   • pioglitazone (ACTOS) 30 MG Tab Take 1 tablet by mouth every day. 90 tablet 3   • metoprolol tartrate (LOPRESSOR) 25 MG Tab Take 1 tablet by mouth 2 times a day. 180 tablet 3   • metformin (GLUCOPHAGE) 1000 MG tablet TAKE 1 TABLET BY MOUTH TWICE A DAY WITH MEALS 180 tablet 3   • lisinopril (PRINIVIL) 5 MG Tab Take 1 tablet by mouth every day. 90 tablet 3   • insulin glargine (LANTUS SOLOSTAR) 100 UNIT/ML Solution Pen-injector injection INJECT 22 UNITS SUBCUTANEOUSLY EVERY EVENING AS DIRECTED BY PHYSICIAN 45 mL 11   • fenofibrate (TRICOR) 54 MG tablet Take 1 tablet by mouth every day. 90 tablet 3   • warfarin (COUMADIN) 1 MG Tab Take 1 Tab by mouth  "every day. 90 Tab 3   • warfarin (COUMADIN) 5 MG Tab TAKE 2 TABLETS BY MOUTH EVERY  Tab 3   • fluticasone (FLONASE) 50 MCG/ACT nasal spray Spray 1 Spray in nose 2 times a day. (Patient not taking: Reported on 4/6/2021) 1 Bottle 2   • neomycin-polymyxin-HC (PEDIOTIC HC) 3.5-32446-1 Suspension Place 8 Drops in right ear.     • ofloxacin otic sol (FLOXIN OTIC) 0.3 % Solution Place 5 Drops in right ear.     • docosahexanoic acid (FISH OIL) 1000 MG CAPS Take 1 Cap by mouth 2 times a day. 60 Cap 6     No current facility-administered medications for this visit.       Allergies, past medical history, past surgical history, medications, family history, social history reviewed and updated.    ROS   Constitutional: Denies fevers or chills  Eyes: Denies changes in vision  Ears/Nose/Throat/Mouth: Denies nasal congestion or sore throat   Cardiovascular: Denies chest pain or palpitations   Respiratory: Denies shortness of breath , Denies cough  Gastrointestinal/Hepatic: Denies abd pain, nausea, vomiting   Genitourinary: Denies dysuria or frequency  Musculoskeletal/Rheum: Denies joint pain and swelling   Neurological: Denies headache  Psychiatric: Denies mood disorder   Endocrine: Denies hx of diabetes or thyroid dysfunction  Heme/Oncology/Lymph Nodes: Denies weight changes or enlarged LNs.    Physical Exam  Vitals: BP (!) 97/58 (BP Location: Right arm, Patient Position: Sitting, BP Cuff Size: Adult)   Pulse 70   Temp 36.1 °C (96.9 °F) (Temporal)   Resp 16   Ht 1.69 m (5' 6.54\")   Wt (!) 132 kg (291 lb 0.1 oz)   SpO2 94%   BMI 46.22 kg/m²   General: Alert, pleasant, NAD  HEENT: Normocephalic.  EOMI, no icterus or pallor.  Conjunctivae and lids normal. External ears normal. Wearing a mask. Oropharynx non-erythematous, mucous membranes moist.  Neck supple.  No thyromegaly or masses palpated.   Lymph: No cervical or supraclavicular lymphadenopathy.  Cardiovascular: Regular rate and rhythm.    Respiratory: Normal " respiratory effort.  Clear to auscultation bilaterally.  Abdomen: Non-distended, soft, non-tender  Skin: Warm, dry, hyperpigmented rash on lower extremities. Dry skin  Musculoskeletal: Gait is normal.  Moves all extremities well.  Extremities: No leg edema. Varicose veins Radial pulses 2+ symmetric.   Psych:  Affect/mood is normal, judgement is good, memory is intact, grooming is appropriate.      Labs (6/4/21) were reviewed and discussed with patients.  All questions were answered.      Assessment and Plan    1. Controlled type 2 diabetes mellitus without complication, with long-term current use of insulin (HCC)  - REFERRAL TO OPHTHALMOLOGY for diabetic eye exam  - Diabetic Monofilament Lower Extremity Exam  He is on Lantus 22 units nightly, Jardiance 10 mg daily, Trulicity 0.75 mg weekly, Actos 30 mg daily and Metformin 1000 mg twice daily    2. Thrombocytopenia (HCC)  - HEP C VIRUS ANTIBODY; Future  - HIV AG/AB COMBO ASSAY SCREENING; Future  - FOLATE; Future  - VITAMIN B12; Future  - METHYLMALONIC ACID, SERUM; Future  - COPPER, SERUM; Future    3. Morbid obesity with BMI of 45.0-49.9, adult (HCC)  - Patient identified as having weight management issue.  Appropriate orders and counseling given.    4. Vitamin D deficiency  Replacement    5. Essential hypertension  Controlled on lisinopril 5 mg daily, spironolactone 50 mg twice daily and metoprolol 25 mg twice daily.    Moisturizer for dry skin recommended      Follow-up:Return in about 6 months (around 12/18/2021), or if symptoms worsen or fail to improve.    This note was created using voice recognition software. There may be unintended errors in spelling, grammar or content.

## 2021-06-18 NOTE — ASSESSMENT & PLAN NOTE
He is on Lantus 22 units nightly, Jardiance 10 mg daily, Trulicity 0.75 mg weekly, Actos 30 mg daily and Metformin 1000 mg twice daily     Ref. Range 6/4/2021 11:34   Glycohemoglobin Latest Ref Range: 4.0 - 5.6 % 6.3 (H)   Estim. Avg Glu Latest Units: mg/dL 134        Ref. Range 11/12/2020 09:09   Glycohemoglobin Latest Ref Range: 0.0 - 5.6 % 6.6 (H)   6/18/21 Monofilament testing with a 10 gram force: sensation intact: intact bilaterally  Visual Inspection: Feet without maceration, ulcers, fissures. Dry feet, on moisturizers  Pedal pulses: intact bilaterally

## 2021-07-01 ENCOUNTER — DOCUMENTATION (OUTPATIENT)
Dept: VASCULAR LAB | Facility: MEDICAL CENTER | Age: 66
End: 2021-07-01

## 2021-07-01 NOTE — PROGRESS NOTES
S/w pt to get INR checked; he informed me he ran out of test strips and is waiting for a shipment. Scheduled POCT appt on 7/12 at [facility]. Pt to cancel appt if his test strips arrive before then.    Savanna Vigil, RobertD

## 2021-07-12 ENCOUNTER — ANTICOAGULATION VISIT (OUTPATIENT)
Dept: MEDICAL GROUP | Facility: MEDICAL CENTER | Age: 66
End: 2021-07-12
Payer: COMMERCIAL

## 2021-07-12 DIAGNOSIS — Z79.01 WARFARIN ANTICOAGULATION: Primary | ICD-10-CM

## 2021-07-12 LAB — INR PPP: 1.9 (ref 2–3.5)

## 2021-07-12 PROCEDURE — 93793 ANTICOAG MGMT PT WARFARIN: CPT | Performed by: INTERNAL MEDICINE

## 2021-07-12 PROCEDURE — 85610 PROTHROMBIN TIME: CPT | Performed by: INTERNAL MEDICINE

## 2021-07-12 NOTE — PROGRESS NOTES
OP Anticoagulation Service Note    Date: 2021  There were no vitals filed for this visit.   pt declined vitals    Anticoagulation Summary  As of 2021    INR goal:  2.0-3.0   TTR:  55.9 % (2.5 mo)   INR used for dosin.90 (2021)   Warfarin maintenance plan:  11 mg (5 mg x 2 and 1 mg x 1) every day   Weekly warfarin total:  77 mg   Plan last modified:  Savanna Vigil, PharmD (2021)   Next INR check:  2021   Target end date:  Indefinite    Indications    Warfarin anticoagulation [Z79.01]  Atrial fibrillation (HCC) [I48.91]             Anticoagulation Episode Summary     INR check location:      Preferred lab:      Send INR reminders to:      Comments:  Armin      Anticoagulation Care Providers     Provider Role Specialty Phone number    YenCiarra Kye Solorio M.D. Referring Internal Medicine [phone]        Anticoagulation Patient Findings  Patient Findings     Positives:  Signs/symptoms of thrombosis, Change in activity, Upcoming invasive procedure    Negatives:  Signs/symptoms of bleeding, Laboratory test error suspected, Change in health, Change in alcohol use, Emergency department visit, Upcoming dental procedure, Missed doses, Extra doses, Change in medications, Change in diet/appetite, Hospital admission, Bruising    Comments:  Moving right now  Has colonoscopy maybe in august          HPI:   Jethro Guillen seen in clinic today, on anticoagulation therapy with warfarin (a high risk medication) for atrial fibrillation      Pt is here today to evaluate anticoagulation therapy    Previous INR was  2.2 on 21    Pt was instructed to continue current regimen    Confirmed warfarin dosing regimen, denies missed or extra doses of coumadin.   Diet has been consistent with foods rich in vitamin K: Yes - but does report some extra broccoli and spinach  Changes in ETOH:  No  Changes in smoking status: No  Changes in medication: No   Pt is not on antiplatelet/NSAID therapy  .  Cost  restriction: No  S/s of bleeding:  No  Falls or accidents since last visit No  Signs/symptoms  thrombosis since the last appt: No      A/P   INR  SUBtherapeutic today, but close to range.    Continue current warfarin regimen     Pt has HM, he has ordered strips and will f/u in 4 weeks with INR at home     4/22 check referral    Pt educated to contact our clinic with any changes in medications or s/s of bleeding or thrombosis. Pt is aware to seek immediate medical attention for falls, head injury or deep cuts    Follow up appointment in 4 week(s) to reduce risk of adverse events from warfarin  Karla Neal, PharmD

## 2021-08-13 ENCOUNTER — ANTICOAGULATION MONITORING (OUTPATIENT)
Dept: MEDICAL GROUP | Facility: PHYSICIAN GROUP | Age: 66
End: 2021-08-13

## 2021-08-13 DIAGNOSIS — I48.91 ATRIAL FIBRILLATION, UNSPECIFIED TYPE (HCC): ICD-10-CM

## 2021-08-13 DIAGNOSIS — Z79.01 WARFARIN ANTICOAGULATION: ICD-10-CM

## 2021-08-13 LAB — INR PPP: 2 (ref 2–3.5)

## 2021-08-13 NOTE — PROGRESS NOTES
OP Telephone Anticoagulation Service Note    Date: 2021      Anticoagulation Summary  As of 2021    INR goal:  2.0-3.0   TTR:  39.3 % (3.5 mo)   INR used for dosin.00 (2021)   Warfarin maintenance plan:  11 mg (5 mg x 2 and 1 mg x 1) every day   Weekly warfarin total:  77 mg   Plan last modified:  Savanna Vigil, PharmD (2021)   Next INR check:     Target end date:  Indefinite    Indications    Warfarin anticoagulation [Z79.01]  Atrial fibrillation (HCC) [I48.91]             Anticoagulation Episode Summary     INR check location:      Preferred lab:      Send INR reminders to:      Comments:  Alere      Anticoagulation Care Providers     Provider Role Specialty Phone number    Marlon Solorio M.D. Referring Internal Medicine [phone]        Anticoagulation Patient Findings      INR therapeutic at 2.0.  Left voicemail message for pt.  Verified regimen.  Continue current regimen.  Told pt to call if any s/s of bleeding or medication changes.  Check INR in 2 week(s).  Instructed pt to call clinic at [phone] if there are any questions.    Romi León, Pharmacy Intern

## 2021-08-27 ENCOUNTER — PRE-ADMISSION TESTING (OUTPATIENT)
Dept: ADMISSIONS | Facility: MEDICAL CENTER | Age: 66
End: 2021-08-27
Attending: STUDENT IN AN ORGANIZED HEALTH CARE EDUCATION/TRAINING PROGRAM
Payer: COMMERCIAL

## 2021-08-27 DIAGNOSIS — Z01.810 PRE-OPERATIVE CARDIOVASCULAR EXAMINATION: ICD-10-CM

## 2021-08-27 DIAGNOSIS — Z01.812 PRE-OPERATIVE LABORATORY EXAMINATION: ICD-10-CM

## 2021-08-27 LAB
ANION GAP SERPL CALC-SCNC: 9 MMOL/L (ref 7–16)
BUN SERPL-MCNC: 19 MG/DL (ref 8–22)
CALCIUM SERPL-MCNC: 9.2 MG/DL (ref 8.5–10.5)
CHLORIDE SERPL-SCNC: 105 MMOL/L (ref 96–112)
CO2 SERPL-SCNC: 22 MMOL/L (ref 20–33)
CREAT SERPL-MCNC: 1.02 MG/DL (ref 0.5–1.4)
EKG IMPRESSION: NORMAL
ERYTHROCYTE [DISTWIDTH] IN BLOOD BY AUTOMATED COUNT: 57.9 FL (ref 35.9–50)
GLUCOSE SERPL-MCNC: 99 MG/DL (ref 65–99)
HCT VFR BLD AUTO: 48.8 % (ref 42–52)
HGB BLD-MCNC: 16.3 G/DL (ref 14–18)
MCH RBC QN AUTO: 32.2 PG (ref 27–33)
MCHC RBC AUTO-ENTMCNC: 33.4 G/DL (ref 33.7–35.3)
MCV RBC AUTO: 96.4 FL (ref 81.4–97.8)
PLATELET # BLD AUTO: 131 K/UL (ref 164–446)
PMV BLD AUTO: 10.6 FL (ref 9–12.9)
POTASSIUM SERPL-SCNC: 5 MMOL/L (ref 3.6–5.5)
RBC # BLD AUTO: 5.06 M/UL (ref 4.7–6.1)
SODIUM SERPL-SCNC: 136 MMOL/L (ref 135–145)
WBC # BLD AUTO: 4.8 K/UL (ref 4.8–10.8)

## 2021-08-27 PROCEDURE — 93010 ELECTROCARDIOGRAM REPORT: CPT | Performed by: INTERNAL MEDICINE

## 2021-08-27 PROCEDURE — 80048 BASIC METABOLIC PNL TOTAL CA: CPT

## 2021-08-27 PROCEDURE — 93005 ELECTROCARDIOGRAM TRACING: CPT

## 2021-08-27 PROCEDURE — 36415 COLL VENOUS BLD VENIPUNCTURE: CPT

## 2021-08-27 PROCEDURE — 85027 COMPLETE CBC AUTOMATED: CPT

## 2021-08-27 ASSESSMENT — FIBROSIS 4 INDEX: FIB4 SCORE: 3.5

## 2021-08-27 NOTE — PREPROCEDURE INSTRUCTIONS
Pt. on coumadin , instructed pt. to follow up with primary doctor for instructions prior to colonoscopy on 9/10.

## 2021-09-09 ENCOUNTER — ANESTHESIA EVENT (OUTPATIENT)
Dept: SURGERY | Facility: MEDICAL CENTER | Age: 66
End: 2021-09-09
Payer: COMMERCIAL

## 2021-09-10 ENCOUNTER — ANESTHESIA (OUTPATIENT)
Dept: SURGERY | Facility: MEDICAL CENTER | Age: 66
End: 2021-09-10
Payer: COMMERCIAL

## 2021-09-10 ENCOUNTER — HOSPITAL ENCOUNTER (OUTPATIENT)
Facility: MEDICAL CENTER | Age: 66
End: 2021-09-10
Attending: STUDENT IN AN ORGANIZED HEALTH CARE EDUCATION/TRAINING PROGRAM | Admitting: STUDENT IN AN ORGANIZED HEALTH CARE EDUCATION/TRAINING PROGRAM
Payer: COMMERCIAL

## 2021-09-10 VITALS
OXYGEN SATURATION: 92 % | SYSTOLIC BLOOD PRESSURE: 135 MMHG | TEMPERATURE: 97.8 F | RESPIRATION RATE: 16 BRPM | DIASTOLIC BLOOD PRESSURE: 79 MMHG | BODY MASS INDEX: 47.44 KG/M2 | WEIGHT: 295.2 LBS | HEIGHT: 66 IN | HEART RATE: 87 BPM

## 2021-09-10 LAB
EXTERNAL QUALITY CONTROL: NORMAL
GLUCOSE BLD-MCNC: 118 MG/DL (ref 65–99)
INR PPP: 1.57 (ref 0.87–1.13)
PATHOLOGY CONSULT NOTE: NORMAL
PROTHROMBIN TIME: 18.3 SEC (ref 12–14.6)
SARS-COV+SARS-COV-2 AG RESP QL IA.RAPID: NEGATIVE

## 2021-09-10 PROCEDURE — 160208 HCHG ENDO MINUTES - EA ADDL 1 MIN LEVEL 4: Performed by: STUDENT IN AN ORGANIZED HEALTH CARE EDUCATION/TRAINING PROGRAM

## 2021-09-10 PROCEDURE — 87426 SARSCOV CORONAVIRUS AG IA: CPT | Performed by: STUDENT IN AN ORGANIZED HEALTH CARE EDUCATION/TRAINING PROGRAM

## 2021-09-10 PROCEDURE — 85610 PROTHROMBIN TIME: CPT

## 2021-09-10 PROCEDURE — 700105 HCHG RX REV CODE 258: Performed by: ANESTHESIOLOGY

## 2021-09-10 PROCEDURE — 160203 HCHG ENDO MINUTES - 1ST 30 MINS LEVEL 4: Performed by: STUDENT IN AN ORGANIZED HEALTH CARE EDUCATION/TRAINING PROGRAM

## 2021-09-10 PROCEDURE — 700101 HCHG RX REV CODE 250: Performed by: ANESTHESIOLOGY

## 2021-09-10 PROCEDURE — 160035 HCHG PACU - 1ST 60 MINS PHASE I: Performed by: STUDENT IN AN ORGANIZED HEALTH CARE EDUCATION/TRAINING PROGRAM

## 2021-09-10 PROCEDURE — 700105 HCHG RX REV CODE 258: Performed by: STUDENT IN AN ORGANIZED HEALTH CARE EDUCATION/TRAINING PROGRAM

## 2021-09-10 PROCEDURE — 160025 RECOVERY II MINUTES (STATS): Performed by: STUDENT IN AN ORGANIZED HEALTH CARE EDUCATION/TRAINING PROGRAM

## 2021-09-10 PROCEDURE — 700111 HCHG RX REV CODE 636 W/ 250 OVERRIDE (IP): Performed by: ANESTHESIOLOGY

## 2021-09-10 PROCEDURE — 160002 HCHG RECOVERY MINUTES (STAT): Performed by: STUDENT IN AN ORGANIZED HEALTH CARE EDUCATION/TRAINING PROGRAM

## 2021-09-10 PROCEDURE — 160048 HCHG OR STATISTICAL LEVEL 1-5: Performed by: STUDENT IN AN ORGANIZED HEALTH CARE EDUCATION/TRAINING PROGRAM

## 2021-09-10 PROCEDURE — 160009 HCHG ANES TIME/MIN: Performed by: STUDENT IN AN ORGANIZED HEALTH CARE EDUCATION/TRAINING PROGRAM

## 2021-09-10 PROCEDURE — 88305 TISSUE EXAM BY PATHOLOGIST: CPT

## 2021-09-10 PROCEDURE — 82962 GLUCOSE BLOOD TEST: CPT

## 2021-09-10 PROCEDURE — 160046 HCHG PACU - 1ST 60 MINS PHASE II: Performed by: STUDENT IN AN ORGANIZED HEALTH CARE EDUCATION/TRAINING PROGRAM

## 2021-09-10 RX ORDER — METOPROLOL TARTRATE 1 MG/ML
1 INJECTION, SOLUTION INTRAVENOUS
Status: DISCONTINUED | OUTPATIENT
Start: 2021-09-10 | End: 2021-09-10 | Stop reason: HOSPADM

## 2021-09-10 RX ORDER — MEPERIDINE HYDROCHLORIDE 25 MG/ML
6.25 INJECTION INTRAMUSCULAR; INTRAVENOUS; SUBCUTANEOUS
Status: DISCONTINUED | OUTPATIENT
Start: 2021-09-10 | End: 2021-09-10 | Stop reason: HOSPADM

## 2021-09-10 RX ORDER — HYDRALAZINE HYDROCHLORIDE 20 MG/ML
5 INJECTION INTRAMUSCULAR; INTRAVENOUS
Status: DISCONTINUED | OUTPATIENT
Start: 2021-09-10 | End: 2021-09-10 | Stop reason: HOSPADM

## 2021-09-10 RX ORDER — LORAZEPAM 2 MG/ML
0.5 INJECTION INTRAMUSCULAR
Status: DISCONTINUED | OUTPATIENT
Start: 2021-09-10 | End: 2021-09-10 | Stop reason: HOSPADM

## 2021-09-10 RX ORDER — HALOPERIDOL 5 MG/ML
1 INJECTION INTRAMUSCULAR
Status: DISCONTINUED | OUTPATIENT
Start: 2021-09-10 | End: 2021-09-10 | Stop reason: HOSPADM

## 2021-09-10 RX ORDER — SODIUM CHLORIDE 9 MG/ML
500 INJECTION, SOLUTION INTRAVENOUS
Status: DISCONTINUED | OUTPATIENT
Start: 2021-09-10 | End: 2021-09-10 | Stop reason: HOSPADM

## 2021-09-10 RX ORDER — SODIUM CHLORIDE, SODIUM LACTATE, POTASSIUM CHLORIDE, CALCIUM CHLORIDE 600; 310; 30; 20 MG/100ML; MG/100ML; MG/100ML; MG/100ML
INJECTION, SOLUTION INTRAVENOUS
Status: DISCONTINUED | OUTPATIENT
Start: 2021-09-10 | End: 2021-09-10 | Stop reason: SURG

## 2021-09-10 RX ORDER — OXYCODONE HCL 5 MG/5 ML
5 SOLUTION, ORAL ORAL
Status: DISCONTINUED | OUTPATIENT
Start: 2021-09-10 | End: 2021-09-10 | Stop reason: HOSPADM

## 2021-09-10 RX ORDER — SODIUM CHLORIDE, SODIUM LACTATE, POTASSIUM CHLORIDE, CALCIUM CHLORIDE 600; 310; 30; 20 MG/100ML; MG/100ML; MG/100ML; MG/100ML
INJECTION, SOLUTION INTRAVENOUS CONTINUOUS
Status: DISCONTINUED | OUTPATIENT
Start: 2021-09-10 | End: 2021-09-10 | Stop reason: HOSPADM

## 2021-09-10 RX ORDER — ONDANSETRON 2 MG/ML
4 INJECTION INTRAMUSCULAR; INTRAVENOUS
Status: DISCONTINUED | OUTPATIENT
Start: 2021-09-10 | End: 2021-09-10 | Stop reason: HOSPADM

## 2021-09-10 RX ORDER — DIPHENHYDRAMINE HYDROCHLORIDE 50 MG/ML
12.5 INJECTION INTRAMUSCULAR; INTRAVENOUS
Status: DISCONTINUED | OUTPATIENT
Start: 2021-09-10 | End: 2021-09-10 | Stop reason: HOSPADM

## 2021-09-10 RX ORDER — LABETALOL HYDROCHLORIDE 5 MG/ML
5 INJECTION, SOLUTION INTRAVENOUS
Status: DISCONTINUED | OUTPATIENT
Start: 2021-09-10 | End: 2021-09-10 | Stop reason: HOSPADM

## 2021-09-10 RX ORDER — HYDROMORPHONE HYDROCHLORIDE 1 MG/ML
0.1 INJECTION, SOLUTION INTRAMUSCULAR; INTRAVENOUS; SUBCUTANEOUS
Status: DISCONTINUED | OUTPATIENT
Start: 2021-09-10 | End: 2021-09-10 | Stop reason: HOSPADM

## 2021-09-10 RX ORDER — HYDROMORPHONE HYDROCHLORIDE 1 MG/ML
0.4 INJECTION, SOLUTION INTRAMUSCULAR; INTRAVENOUS; SUBCUTANEOUS
Status: DISCONTINUED | OUTPATIENT
Start: 2021-09-10 | End: 2021-09-10 | Stop reason: HOSPADM

## 2021-09-10 RX ORDER — PHENYLEPHRINE HCL IN 0.9% NACL 0.5 MG/5ML
SYRINGE (ML) INTRAVENOUS PRN
Status: DISCONTINUED | OUTPATIENT
Start: 2021-09-10 | End: 2021-09-10 | Stop reason: SURG

## 2021-09-10 RX ORDER — LIDOCAINE HYDROCHLORIDE 20 MG/ML
INJECTION, SOLUTION EPIDURAL; INFILTRATION; INTRACAUDAL; PERINEURAL PRN
Status: DISCONTINUED | OUTPATIENT
Start: 2021-09-10 | End: 2021-09-10 | Stop reason: SURG

## 2021-09-10 RX ORDER — OXYCODONE HCL 5 MG/5 ML
10 SOLUTION, ORAL ORAL
Status: DISCONTINUED | OUTPATIENT
Start: 2021-09-10 | End: 2021-09-10 | Stop reason: HOSPADM

## 2021-09-10 RX ORDER — HYDROMORPHONE HYDROCHLORIDE 1 MG/ML
0.2 INJECTION, SOLUTION INTRAMUSCULAR; INTRAVENOUS; SUBCUTANEOUS
Status: DISCONTINUED | OUTPATIENT
Start: 2021-09-10 | End: 2021-09-10 | Stop reason: HOSPADM

## 2021-09-10 RX ADMIN — SODIUM CHLORIDE, POTASSIUM CHLORIDE, SODIUM LACTATE AND CALCIUM CHLORIDE: 600; 310; 30; 20 INJECTION, SOLUTION INTRAVENOUS at 08:56

## 2021-09-10 RX ADMIN — SODIUM CHLORIDE, POTASSIUM CHLORIDE, SODIUM LACTATE AND CALCIUM CHLORIDE: 600; 310; 30; 20 INJECTION, SOLUTION INTRAVENOUS at 08:20

## 2021-09-10 RX ADMIN — LIDOCAINE HYDROCHLORIDE 50 MG: 20 INJECTION, SOLUTION EPIDURAL; INFILTRATION; INTRACAUDAL at 09:02

## 2021-09-10 RX ADMIN — Medication 100 MCG: at 09:02

## 2021-09-10 RX ADMIN — PROPOFOL 100 MG: 10 INJECTION, EMULSION INTRAVENOUS at 09:02

## 2021-09-10 ASSESSMENT — PAIN DESCRIPTION - PAIN TYPE: TYPE: SURGICAL PAIN

## 2021-09-10 ASSESSMENT — FIBROSIS 4 INDEX: FIB4 SCORE: 3.69

## 2021-09-10 NOTE — OR NURSING
0940: patient arrived from OR, report received, attached to monitoring. VSS, patient oxygenating well on 6 L oxymask, turned on side, s/p colonoscopy with clip placement,     0949: patient turned on side, denies pain and nausea at the moment, patient returns to sleep     1010: telephone updates given to wife Lucinda, discharge education provided as well, questions answered    1015: report called to GREGORY Graff for phase II

## 2021-09-10 NOTE — ANESTHESIA POSTPROCEDURE EVALUATION
Patient: Jethro Guillen    Procedure Summary     Date: 09/10/21 Room / Location: [facility] ROOM 26 / SURGERY SAME DAY [facility]    Anesthesia Start: 0856 Anesthesia Stop: 0943    Procedures:       COLONOSCOPY (N/A Anus)      COLONOSCOPY, WITH POLYPECTOMY (N/A Anus)      COLONOSCOPY, WITH CLIP APPLICATION (N/A Anus) Diagnosis: (3 rectal polyps, 1 cecal polyp)    Surgeons: Andrew Bautista M.D. Responsible Provider: Karla Hayes M.D.    Anesthesia Type: general ASA Status: 3          Final Anesthesia Type: general  Last vitals  BP   Blood Pressure : (!) 84/50    Temp   (!) 35.7 °C (96.3 °F)    Pulse   84   Resp   14    SpO2   95 %      Anesthesia Post Evaluation    Patient location during evaluation: PACU  Patient participation: complete - patient participated  Level of consciousness: awake and alert    Airway patency: patent  Anesthetic complications: no  Cardiovascular status: hemodynamically stable  Respiratory status: acceptable  Hydration status: euvolemic    PONV: none          No complications documented.

## 2021-09-10 NOTE — PROCEDURES
Patient Name: Jethro Guillen    Colonoscopy Procedure Note  Date of Procedure: 9/10/2021  Attending Physician: Andrew Bautista M.D.        Indications: Colorectal cancer screening  Previous colonoscopy: Denies  Sedation: MAC    Procedure Details:    Colonoscopy  Pre-Anesthesia Assessment:  Prior to the procedure, a History and Physical was performed, and patient medications and allergies were reviewed. The patient’s tolerance of previous anesthesia was also reviewed. The risks and benefits of the procedure and the sedation options and risks were discussed with the patient including but not limited to infection, bleeding, aspiration, perforation, adverse medication reaction, missed diagnosis, and missed lesions. The patient verbalized understanding. All questions were answered, and informed consent was obtained.     Prior Anticoagulants: Patient has taken Coumadin 3 days ago  ASA Grade Assessment: 3    After I obtained informed consent from the patient, the patient was placed in the left lateral position. Appropriate time-out protocol was followed: the correct patient, the correct procedure, and the correct equipment in the room were confirmed. Throughout the procedure, the patient’s blood pressure, pulse, and oxygen saturations were monitored continuously. Digital rectal exam was performed. The Olympus variable stiffness colonoscope was introduced through the anus and passed under direct vision. The scope was advanced to the cecum, identified by the appendiceal orifice and ileocecal valve. The colonscopy was performed with significant difficulty due to poor initial prep significant tortuosity of the colon and looping requiring the patient to have right upper quadrant pressure placed to push the scope into the cecum; also patient obstructed his airway during the procedure length difficulties and polypectomy. The patient tolerated the procedure well. The quality of the bowel preparation was good.  Findings and  interventions were performed and documented below. The endoscope was then removed and the procedure was terminated. There were no immediate postoperative complications.        Findings and Impressions: Large intestine: Stool throughout the colon semisolid in nature clogged the suction channel multiple times.  Significant irrigation was needed to clear her to make bowel prep good based on the [place] bowel prep score    Large forceps completely removed in 1 piece a 2 mm sessile cecal polyp.  Retrieval was complete.  Large forceps removed 2X 2 mm sessile rectal polyps with complete retrieval.  Cold snare removed an 11 mm ulcerated semipedunculated polyp in a single piece with complete retrieval.  The base of this polyp blood.  4 clips were placed for hemostasis.    This tedious procedure took 3 times my average length of time for completion.    Recommendations: Follow-up pathology  Since hemostasis is achieved, patient may resume warfarin.  Future colonoscopies will entail that the patient undergo 2-day bowel prep with GoLYTELY and clear liquid diet  Patient absolutely needs to lose weight    -biopsies should result in 1-2 weeks      This note was generated using voice recognition software which has a small chance of producing errors of grammar and possibly content. I have made every reasonable attempt to find and correct any obvious errors, but expect that some may not be found prior to finalization of this note    Andrew Bautista MD, [facility]  [facility]

## 2021-09-10 NOTE — DISCHARGE INSTRUCTIONS
COLONOSCOPY OR FLEXIBLE SIGMOIDOSCOPY  1. If you received a barium enema, take a mild laxative such as dulcolax to clean out the barium.   2. Drink plenty of fluids. Eat a diet high in fiber; such foods as whole-grain breads, fresh fruit and vegetables, nuts are recommended.  3. You may notice a few drops of blood with your first bowel movement. If you develop any large amount of bleeding, black stools, a fever, or abdominal pain, call your doctor right away.   4. Call your doctor for test results in 5-7 days.  5. Don't drive or drink alcohol for 24 hours. The medication you received will make you too drowsy.   6. No heavy lifting, ASA products or ASA x 5 days       You should call 911 if you develop problems with breathing or chest pain.    Nurse's Signature: ___________________________________    I acknowledge receipt and understanding of these Home Care instructions.      ACTIVITY: Rest and take it easy for the first 24 hours.  A responsible adult is recommended to remain with you during that time.  It is normal to feel sleepy.  We encourage you to not do anything that requires balance, judgment or coordination.    MILD FLU-LIKE SYMPTOMS ARE NORMAL. YOU MAY EXPERIENCE GENERALIZED MUSCLE ACHES, THROAT IRRITATION, HEADACHE AND/OR SOME NAUSEA.    FOR 24 HOURS DO NOT:  Drive, operate machinery or run household appliances.  Drink beer or alcoholic beverages.   Make important decisions or sign legal documents.    DIET: To avoid nausea, slowly advance diet as tolerated, avoiding spicy or greasy foods for the first day.  Add more substantial food to your diet according to your physician's instructions.  INCREASE FLUIDS AND FIBER TO AVOID CONSTIPATION.    FOLLOW-UP APPOINTMENT:  A follow-up appointment should be arranged with your doctor in 2 weeks; call to schedule.    You should CALL YOUR PHYSICIAN if you develop:  Fever greater than 101 degrees F.  Pain not relieved by medication, or persistent nausea or  vomiting.  Excessive bleeding (blood soaking through dressing) or unexpected drainage from the wound.  Extreme redness or swelling around the incision site, drainage of pus or foul smelling drainage.  Inability to urinate or empty your bladder within 8 hours.  Problems with breathing or chest pain.    You should call 911 if you develop problems with breathing or chest pain.  If you are unable to contact your doctor or surgical center, you should go to the nearest emergency room or urgent care center.  Physician's telephone #: [phone]    If any questions arise, call your doctor.  If your doctor is not available, please feel free to call the Surgical Center at [phone]. The Contact Center is open Monday through Friday 7AM to 5PM and may speak to a nurse at [phone], or toll free at [phone].     A registered nurse may call you a few days after your surgery to see how you are doing after your procedure.        Depression / Suicide Risk    As you are discharged from this [facility] facility, it is important to learn how to keep safe from harming yourself.    Recognize the warning signs:  · Abrupt changes in personality, positive or negative- including increase in energy   · Giving away possessions  · Change in eating patterns- significant weight changes-  positive or negative  · Change in sleeping patterns- unable to sleep or sleeping all the time   · Unwillingness or inability to communicate  · Depression  · Unusual sadness, discouragement and loneliness  · Talk of wanting to die  · Neglect of personal appearance   · Rebelliousness- reckless behavior  · Withdrawal from people/activities they love  · Confusion- inability to concentrate     If you or a loved one observes any of these behaviors or has concerns about self-harm, here's what you can do:  · Talk about it- your feelings and reasons for harming yourself  · Remove any means that you might use to hurt yourself (examples: pills, rope,  extension cords, firearm)  · Get professional help from the community (Mental Health, Substance Abuse, psychological counseling)  · Do not be alone:Call your Safe Contact- someone whom you trust who will be there for you.  · Call your local CRISIS HOTLINE 447-3991 or 527-269-9691  · Call your local Children's Mobile Crisis Response Team [place] (760) 962-8300 or www.[employer]  · Call the toll free National Suicide Prevention Hotlines   · National Suicide Prevention Lifeline 556-803-BZGC (5611)  · National Hope Line Network 800-SUICIDE (822-5468)

## 2021-09-10 NOTE — ANESTHESIA TIME REPORT
Anesthesia Start and Stop Event Times     Date Time Event    9/10/2021 0753 Ready for Procedure     0856 Anesthesia Start     0943 Anesthesia Stop        Responsible Staff  09/10/21    Name Role Begin End    Karla Hayes M.D. Anesth 0856 0943        Preop Diagnosis (Free Text):  Pre-op Diagnosis     DM 2, ATRIAL FIBRILLATION, ANTICOAGULATION THERAPY, ANAMIKA, BODY MASS 45.0-49.9        Preop Diagnosis (Codes):    Post op Diagnosis  Encounter for screening colonoscopy      Premium Reason  Non-Premium    Comments:

## 2021-09-10 NOTE — OR NURSING
1017- pt arrived to phase 2, report received.  Patient has no complaints of pain or nausea at this time.      1025- pt provided a gingerale, wife brought back to assist with patient getting dressed    1033 Discharge instructions discussed with wife and patient.  All questions answered.     1045 d/c IV and pt discharged home via wheelchair.

## 2021-09-10 NOTE — ANESTHESIA PREPROCEDURE EVALUATION
Relevant Problems   ANESTHESIA   (positive) ANAMIKA treated with BiPAP   (positive) Obstructive sleep apnea      NEURO   (positive) History of hepatitis      CARDIAC   (positive) Atrial fibrillation (HCC)   (positive) HTN (hypertension)         (positive) Fatty liver      ENDO   (positive) Controlled type 2 diabetes mellitus without complication, with long-term current use of insulin (HCC)       Physical Exam    Airway   Mallampati: II  TM distance: >3 FB  Neck ROM: full       Cardiovascular - normal exam  Rhythm: irregular  Rate: normal  (-) murmur     Dental - normal exam           Pulmonary - normal exam  Breath sounds clear to auscultation     Abdominal    Neurological - normal exam                 Anesthesia Plan    ASA 3   ASA physical status 3 criteria: morbid obesity - BMI greater than or equal to 40    Plan - general         (66 yo male with BMI > 40, ANAMIKA on BiPAP, atrial fibrillation, anticoagulation, HTN - here for EGD/colonoscopy under GA)      Induction: intravenous      Pertinent diagnostic labs and testing reviewed    Informed Consent:    Anesthetic plan and risks discussed with patient.

## 2021-09-13 DIAGNOSIS — E11.9 CONTROLLED TYPE 2 DIABETES MELLITUS WITHOUT COMPLICATION, WITH LONG-TERM CURRENT USE OF INSULIN (HCC): ICD-10-CM

## 2021-09-13 DIAGNOSIS — Z79.4 CONTROLLED TYPE 2 DIABETES MELLITUS WITHOUT COMPLICATION, WITH LONG-TERM CURRENT USE OF INSULIN (HCC): ICD-10-CM

## 2021-09-13 RX ORDER — INSULIN GLARGINE 100 [IU]/ML
INJECTION, SOLUTION SUBCUTANEOUS
Qty: 24 ML | Refills: 3 | Status: SHIPPED | OUTPATIENT
Start: 2021-09-13 | End: 2021-10-08 | Stop reason: SDUPTHER

## 2021-09-14 ENCOUNTER — ANTICOAGULATION MONITORING (OUTPATIENT)
Dept: VASCULAR LAB | Facility: MEDICAL CENTER | Age: 66
End: 2021-09-14

## 2021-09-14 DIAGNOSIS — Z79.01 WARFARIN ANTICOAGULATION: ICD-10-CM

## 2021-09-14 NOTE — PROGRESS NOTES
Anticoagulation Summary  As of 2021    INR goal:  2.0-3.0   TTR:  31.0 % (4.5 mo)   INR used for dosin.57 (9/10/2021)   Warfarin maintenance plan:  11 mg (5 mg x 2 and 1 mg x 1) every day   Weekly warfarin total:  77 mg   Plan last modified:  Savanna Vigil PharmD (2021)   Next INR check:  2021   Target end date:  Indefinite    Indications    Warfarin anticoagulation [Z79.01]  Atrial fibrillation (HCC) [I48.91]             Anticoagulation Episode Summary     INR check location:      Preferred lab:      Send INR reminders to:      Comments:  Armin      Anticoagulation Care Providers     Provider Role Specialty Phone number    YenchuckieMickeyEmilie Kye Solorio M.D. Referring Internal Medicine [phone]        Anticoagulation Patient Findings      Left voicemail message to report a subtherapeutic INR of 1.57.    Will have pt take bolus dose of warfarin today of 16 mg and then   Pt to continue with current warfarin dosing regimen. Requested pt contact the clinic for any s/s of unusual bleeding, bruising, clotting or any changes to diet or medication.    FU INR in 1 week(s).    Savanna Vigil, Karlos

## 2021-10-05 ENCOUNTER — HOSPITAL ENCOUNTER (OUTPATIENT)
Dept: LAB | Facility: MEDICAL CENTER | Age: 66
End: 2021-10-05
Attending: INTERNAL MEDICINE
Payer: COMMERCIAL

## 2021-10-05 DIAGNOSIS — D69.6 THROMBOCYTOPENIA (HCC): ICD-10-CM

## 2021-10-05 LAB
FOLATE SERPL-MCNC: 10.1 NG/ML
HCV AB SER QL: NORMAL
HIV 1+2 AB+HIV1 P24 AG SERPL QL IA: NORMAL
VIT B12 SERPL-MCNC: 620 PG/ML (ref 211–911)

## 2021-10-05 PROCEDURE — 86803 HEPATITIS C AB TEST: CPT

## 2021-10-05 PROCEDURE — 36415 COLL VENOUS BLD VENIPUNCTURE: CPT

## 2021-10-05 PROCEDURE — 87389 HIV-1 AG W/HIV-1&-2 AB AG IA: CPT

## 2021-10-05 PROCEDURE — 83921 ORGANIC ACID SINGLE QUANT: CPT

## 2021-10-05 PROCEDURE — 82525 ASSAY OF COPPER: CPT

## 2021-10-05 PROCEDURE — 82746 ASSAY OF FOLIC ACID SERUM: CPT

## 2021-10-05 PROCEDURE — 82607 VITAMIN B-12: CPT

## 2021-10-08 ENCOUNTER — ANTICOAGULATION MONITORING (OUTPATIENT)
Dept: MEDICAL GROUP | Facility: PHYSICIAN GROUP | Age: 66
End: 2021-10-08

## 2021-10-08 ENCOUNTER — OFFICE VISIT (OUTPATIENT)
Dept: MEDICAL GROUP | Facility: MEDICAL CENTER | Age: 66
End: 2021-10-08
Payer: COMMERCIAL

## 2021-10-08 ENCOUNTER — TELEPHONE (OUTPATIENT)
Dept: VASCULAR LAB | Facility: MEDICAL CENTER | Age: 66
End: 2021-10-08

## 2021-10-08 VITALS
HEIGHT: 66 IN | TEMPERATURE: 97.1 F | RESPIRATION RATE: 14 BRPM | HEART RATE: 72 BPM | DIASTOLIC BLOOD PRESSURE: 58 MMHG | SYSTOLIC BLOOD PRESSURE: 94 MMHG | BODY MASS INDEX: 47.12 KG/M2 | WEIGHT: 293.21 LBS | OXYGEN SATURATION: 95 %

## 2021-10-08 DIAGNOSIS — I48.91 ATRIAL FIBRILLATION, UNSPECIFIED TYPE (HCC): ICD-10-CM

## 2021-10-08 DIAGNOSIS — Z23 NEED FOR VACCINATION: ICD-10-CM

## 2021-10-08 DIAGNOSIS — I10 ESSENTIAL HYPERTENSION: ICD-10-CM

## 2021-10-08 DIAGNOSIS — Z79.01 WARFARIN ANTICOAGULATION: ICD-10-CM

## 2021-10-08 DIAGNOSIS — E78.2 MIXED HYPERLIPIDEMIA: ICD-10-CM

## 2021-10-08 DIAGNOSIS — Z79.4 CONTROLLED TYPE 2 DIABETES MELLITUS WITHOUT COMPLICATION, WITH LONG-TERM CURRENT USE OF INSULIN (HCC): ICD-10-CM

## 2021-10-08 DIAGNOSIS — E11.9 CONTROLLED TYPE 2 DIABETES MELLITUS WITHOUT COMPLICATION, WITH LONG-TERM CURRENT USE OF INSULIN (HCC): ICD-10-CM

## 2021-10-08 LAB
HBA1C MFR BLD: 6 % (ref 0–5.6)
INR PPP: 2.5 (ref 2–3.5)
INT CON NEG: NEGATIVE
INT CON POS: POSITIVE
METHYLMALONATE SERPL-SCNC: 0.19 UMOL/L (ref 0–0.4)

## 2021-10-08 PROCEDURE — 90662 IIV NO PRSV INCREASED AG IM: CPT | Performed by: INTERNAL MEDICINE

## 2021-10-08 PROCEDURE — 99214 OFFICE O/P EST MOD 30 MIN: CPT | Mod: 25 | Performed by: INTERNAL MEDICINE

## 2021-10-08 PROCEDURE — 90471 IMMUNIZATION ADMIN: CPT | Performed by: INTERNAL MEDICINE

## 2021-10-08 PROCEDURE — 83036 HEMOGLOBIN GLYCOSYLATED A1C: CPT | Performed by: INTERNAL MEDICINE

## 2021-10-08 RX ORDER — DULAGLUTIDE 0.75 MG/.5ML
0.5 INJECTION, SOLUTION SUBCUTANEOUS
Qty: 6 ML | Refills: 3 | Status: SHIPPED | OUTPATIENT
Start: 2021-10-08

## 2021-10-08 RX ORDER — PIOGLITAZONEHYDROCHLORIDE 30 MG/1
30 TABLET ORAL DAILY
Qty: 90 TABLET | Refills: 3 | Status: SHIPPED | OUTPATIENT
Start: 2021-10-08

## 2021-10-08 RX ORDER — PRAVASTATIN SODIUM 80 MG/1
80 TABLET ORAL
Qty: 90 TABLET | Refills: 3 | Status: SHIPPED | OUTPATIENT
Start: 2021-10-08

## 2021-10-08 RX ORDER — INSULIN GLARGINE 100 [IU]/ML
INJECTION, SOLUTION SUBCUTANEOUS
Qty: 24 ML | Refills: 3 | Status: SHIPPED | OUTPATIENT
Start: 2021-10-08

## 2021-10-08 RX ORDER — LISINOPRIL 5 MG/1
5 TABLET ORAL
Qty: 90 TABLET | Refills: 3 | Status: SHIPPED | OUTPATIENT
Start: 2021-10-08

## 2021-10-08 RX ORDER — SPIRONOLACTONE 50 MG/1
TABLET, FILM COATED ORAL
Qty: 180 TABLET | Refills: 3 | Status: SHIPPED | OUTPATIENT
Start: 2021-10-08 | End: 2022-04-15 | Stop reason: SDUPTHER

## 2021-10-08 RX ORDER — EMPAGLIFLOZIN 10 MG/1
1 TABLET, FILM COATED ORAL DAILY
Qty: 90 TABLET | Refills: 3 | Status: SHIPPED | OUTPATIENT
Start: 2021-10-08

## 2021-10-08 ASSESSMENT — FIBROSIS 4 INDEX: FIB4 SCORE: 3.74

## 2021-10-08 NOTE — ASSESSMENT & PLAN NOTE
Stable on pravastatin 80 mg daily   He states that he has been taking the fenofibrate and he would like to try not to take fenofibrate and monitor

## 2021-10-08 NOTE — PROGRESS NOTES
Established Patient    Jethro Guillen is a 66 y.o. male who presents today with the following:    CC:   Chief Complaint   Patient presents with   • Follow-Up   • Medication Refill     Needs all meds filled retiring/Ins changing/moving out of [facility]       HPI:     He states he would like to refill his medication and moving to [place].  Recommend patient establish care with primary care in [facility] and possible anticoagulation clinic as well.     Atrial fibrillation (HCC)  Rate controlled with metoprolol  On warfarin for anticoagulation      Essential hypertension  Controlled on lisinopril 5 mg daily, spironolactone 50 mg twice daily and metoprolol 25 mg twice daily.      Hyperlipidemia  Stable on pravastatin 80 mg daily   He states that he has been taking the fenofibrate and he would like to try not to take fenofibrate and monitor        Current Outpatient Medications   Medication Sig Dispense Refill   • spironolactone (ALDACTONE) 50 MG Tab TAKE 1 TABLET BY MOUTH TWICE A  Tablet 3   • pravastatin (PRAVACHOL) 80 MG tablet Take 1 Tablet by mouth at bedtime. 90 Tablet 3   • pioglitazone (ACTOS) 30 MG Tab Take 1 Tablet by mouth every day. 90 Tablet 3   • metoprolol tartrate (LOPRESSOR) 25 MG Tab Take 1 Tablet by mouth 2 times a day. 180 Tablet 3   • metformin (GLUCOPHAGE) 1000 MG tablet TAKE 1 TABLET BY MOUTH TWICE A DAY WITH MEALS 180 Tablet 3   • lisinopril (PRINIVIL) 5 MG Tab Take 1 Tablet by mouth every day. 90 Tablet 3   • Empagliflozin (JARDIANCE) 10 MG Tab Take 1 Tablet by mouth every day. 90 Tablet 3   • Dulaglutide (TRULICITY) 0.75 MG/0.5ML Solution Pen-injector Inject 0.5 mL under the skin every 7 days. 6 mL 3   • insulin glargine (LANTUS SOLOSTAR) 100 UNIT/ML Solution Pen-injector injection INJECT 22 UNITS SUBCUTANEOUSLY EVERY EVENING AS DIRECTED BY PHYSICIAN 24 mL 3   • warfarin (COUMADIN) 1 MG Tab Take 1 Tab by mouth every day. 90 Tab 3   • warfarin (COUMADIN) 5 MG Tab TAKE 2 TABLETS BY MOUTH EVERY  " Tab 3   • fluticasone (FLONASE) 50 MCG/ACT nasal spray Spray 1 Spray in nose 2 times a day. 1 Bottle 2   • neomycin-polymyxin-HC (PEDIOTIC HC) 3.5-79174-7 Suspension Administer 8 Drops into the right ear.     • ofloxacin otic sol (FLOXIN OTIC) 0.3 % Solution Administer 5 Drops into the right ear.     • docosahexanoic acid (FISH OIL) 1000 MG CAPS Take 1 Cap by mouth 2 times a day. 60 Cap 6     No current facility-administered medications for this visit.       Allergies, past medical history, past surgical history, medications, family history, social history reviewed and updated.    ROS   Constitutional: Denies fevers or chills  Eyes: Denies changes in vision  Ears/Nose/Throat/Mouth: Denies nasal congestion or sore throat   Cardiovascular: Denies chest pain or palpitations   Respiratory: Denies shortness of breath , Denies cough  Gastrointestinal/Hepatic: Denies abd pain, nausea, vomiting   Genitourinary: Denies dysuria or frequency  Musculoskeletal/Rheum: Denies joint pain and swelling   Neurological: Denies headache  Psychiatric: Denies mood disorder   Endocrine: Denies hx of diabetes or thyroid dysfunction  Heme/Oncology/Lymph Nodes: Denies weight changes or enlarged LNs.    Physical Exam  Vitals: BP (!) 94/58 (BP Location: Left arm, Patient Position: Sitting, BP Cuff Size: Adult)   Pulse 72   Temp 36.2 °C (97.1 °F) (Temporal)   Resp 14   Ht 1.68 m (5' 6.14\")   Wt (!) 133 kg (293 lb 3.4 oz)   SpO2 95%   BMI 47.12 kg/m²   General: Alert, pleasant, NAD  HEENT: Normocephalic.  EOMI, no icterus or pallor.  Conjunctivae and lids normal. External ears normal. Wearing a mask. Oropharynx non-erythematous, mucous membranes moist.  Neck supple.  No thyromegaly or masses palpated.   Lymph: No cervical or supraclavicular lymphadenopathy.  Cardiovascular: Regular rate and rhythm.    Respiratory: Normal respiratory effort.  Clear to auscultation bilaterally.  Abdomen: Non-distended, soft, non-tender  Skin: Warm, " dry, no rashes.  Musculoskeletal: Gait is normal.  Moves all extremities well.  Extremities: No leg edema.    Psych:  Affect/mood is normal, judgement is good, memory is intact, grooming is appropriate.      Assessment and Plan    1. Essential hypertension  - spironolactone (ALDACTONE) 50 MG Tab; TAKE 1 TABLET BY MOUTH TWICE A DAY  Dispense: 180 Tablet; Refill: 3  - metoprolol tartrate (LOPRESSOR) 25 MG Tab; Take 1 Tablet by mouth 2 times a day.  Dispense: 180 Tablet; Refill: 3  - lisinopril (PRINIVIL) 5 MG Tab; Take 1 Tablet by mouth every day.  Dispense: 90 Tablet; Refill: 3    2. Mixed hyperlipidemia  - pravastatin (PRAVACHOL) 80 MG tablet; Take 1 Tablet by mouth at bedtime.  Dispense: 90 Tablet; Refill: 3    3. Controlled type 2 diabetes mellitus without complication, with long-term current use of insulin (HCC)  - pioglitazone (ACTOS) 30 MG Tab; Take 1 Tablet by mouth every day.  Dispense: 90 Tablet; Refill: 3  - metformin (GLUCOPHAGE) 1000 MG tablet; TAKE 1 TABLET BY MOUTH TWICE A DAY WITH MEALS  Dispense: 180 Tablet; Refill: 3  - Empagliflozin (JARDIANCE) 10 MG Tab; Take 1 Tablet by mouth every day.  Dispense: 90 Tablet; Refill: 3  - Dulaglutide (TRULICITY) 0.75 MG/0.5ML Solution Pen-injector; Inject 0.5 mL under the skin every 7 days.  Dispense: 6 mL; Refill: 3  - insulin glargine (LANTUS SOLOSTAR) 100 UNIT/ML Solution Pen-injector injection; INJECT 22 UNITS SUBCUTANEOUSLY EVERY EVENING AS DIRECTED BY PHYSICIAN  Dispense: 24 mL; Refill: 3  - POCT  A1C 6  Recommend diabetic eye exam    4. Atrial fibrillation, unspecified type (HCC)  Rate controlled with metoprolol  On warfarin for anticoagulation    5. Need for vaccination  - INFLUENZA VACCINE, HIGH DOSE (65+ ONLY)    Recommend patient establish care with primary care in [place] and possible anticoagulation clinic as well.     Follow-up:Return if symptoms worsen or fail to improve.    This note was created using voice recognition software. There may be  unintended errors in spelling, grammar or content.

## 2021-10-09 LAB — COPPER SERPL-MCNC: 145.3 UG/DL (ref 70–140)

## 2021-10-09 NOTE — PROGRESS NOTES
Anticoagulation Summary  As of 10/8/2021    INR goal:  2.0-3.0   TTR:  34.9 % (5.4 mo)   INR used for dosin.50 (10/8/2021)   Warfarin maintenance plan:  11 mg (5 mg x 2 and 1 mg x 1) every day   Weekly warfarin total:  77 mg   Plan last modified:  Savanna Vigil PharmD (2021)   Next INR check:  10/22/2021   Target end date:  Indefinite    Indications    Warfarin anticoagulation [Z79.01]  Atrial fibrillation (HCC) [I48.91]             Anticoagulation Episode Summary     INR check location:      Preferred lab:      Send INR reminders to:      Comments:  Armin      Anticoagulation Care Providers     Provider Role Specialty Phone number    YenchuckieMickeyEmilie Kye Solorio M.D. Referring Internal Medicine [phone]        Anticoagulation Patient Findings    Left voicemail message to report a therapeutic INR of 2.5.    Pt to continue with current warfarin dosing regimen. Requested pt contact the clinic for any s/s of unusual bleeding, bruising, clotting or any changes to diet or medication.    FU INR in 2 week(s).    Pedro Dowell, RobertD

## 2021-10-20 DIAGNOSIS — Z79.01 WARFARIN ANTICOAGULATION: ICD-10-CM

## 2021-10-20 RX ORDER — WARFARIN SODIUM 1 MG/1
TABLET ORAL
Qty: 90 TABLET | Refills: 1 | Status: SHIPPED | OUTPATIENT
Start: 2021-10-20 | End: 2022-07-25 | Stop reason: SDUPTHER

## 2021-10-20 RX ORDER — WARFARIN SODIUM 5 MG/1
TABLET ORAL
Qty: 180 TABLET | Refills: 1 | Status: SHIPPED | OUTPATIENT
Start: 2021-10-20 | End: 2022-08-31

## 2021-11-09 ENCOUNTER — TELEPHONE (OUTPATIENT)
Dept: VASCULAR LAB | Facility: MEDICAL CENTER | Age: 66
End: 2021-11-09

## 2021-11-14 LAB — INR PPP: 3.7 (ref 2–3.5)

## 2021-11-15 ENCOUNTER — ANTICOAGULATION MONITORING (OUTPATIENT)
Dept: VASCULAR LAB | Facility: MEDICAL CENTER | Age: 66
End: 2021-11-15

## 2021-11-15 DIAGNOSIS — Z79.01 WARFARIN ANTICOAGULATION: ICD-10-CM

## 2021-11-15 NOTE — PROGRESS NOTES
Anticoagulation Summary  As of 11/15/2021    INR goal:  2.0-3.0   TTR:  36.1 % (6.6 mo)   INR used for dosing:  3.70 (11/14/2021)   Warfarin maintenance plan:  11 mg (5 mg x 2 and 1 mg x 1) every day   Weekly warfarin total:  77 mg   Plan last modified:  Savanna Vigil PharmD (4/27/2021)   Next INR check:  11/29/2021   Target end date:  Indefinite    Indications    Warfarin anticoagulation [Z79.01]  Atrial fibrillation (HCC) [I48.91]             Anticoagulation Episode Summary     INR check location:      Preferred lab:      Send INR reminders to:      Comments:  Armin      Anticoagulation Care Providers     Provider Role Specialty Phone number    YenchuckieMickeyEmilie Kye Solorio M.D. Referring Internal Medicine [phone]        Anticoagulation Patient Findings          Left voicemail message to report a SUPRA therapeutic INR of 3.7.  Pt to hold today then continue with current warfarin dosing regimen. Requested pt contact the clinic for any s/s of unusual bleeding, bruising, clotting or any changes to diet or medication. FU 2 weeks.    Yemi Lutz, Karlos      ADDENDUM 11/15/21: Received call back from pt. Relayed info above. Pt reports no changes and verbalized understanding. Savanna Vigil, Karlos

## 2021-12-21 ENCOUNTER — TELEPHONE (OUTPATIENT)
Dept: VASCULAR LAB | Facility: MEDICAL CENTER | Age: 66
End: 2021-12-21

## 2021-12-24 LAB — INR PPP: 2.7 (ref 2–3.5)

## 2021-12-27 ENCOUNTER — ANTICOAGULATION MONITORING (OUTPATIENT)
Dept: MEDICAL GROUP | Facility: MEDICAL CENTER | Age: 66
End: 2021-12-27

## 2021-12-27 DIAGNOSIS — Z79.01 WARFARIN ANTICOAGULATION: ICD-10-CM

## 2021-12-27 NOTE — PROGRESS NOTES
OP Anticoagulation Service Note    Date: 2021    Anticoagulation Summary  As of 2021    INR goal:  2.0-3.0   TTR:  35.1 % (8 mo)   INR used for dosin.70 (2021)   Warfarin maintenance plan:  11 mg (5 mg x 2 and 1 mg x 1) every day   Weekly warfarin total:  77 mg   Plan last modified:  Savanna Abreu, PharmD (2021)   Next INR check:  2022   Target end date:  Indefinite    Indications    Warfarin anticoagulation [Z79.01]  Atrial fibrillation (HCC) [I48.91]             Anticoagulation Episode Summary     INR check location:      Preferred lab:      Send INR reminders to:      Comments:  Alere      Anticoagulation Care Providers     Provider Role Specialty Phone number    Marlon Solorio M.D. Referring Internal Medicine [phone]        Anticoagulation Patient Findings        HPI:   The reason for today's call is to prevent morbidity and mortality from a blood clot and/or stroke and to reduce the risk of bleeding while on a anticoagulant.     PCP:  Marlon Solorio M.D.  [address]  [facility] [address]    Assessment:     • INR  therapeutic.       Current Outpatient Medications:   •  warfarin, Take two tablets by mouth daily or as directed by anticoagulation clinic  •  warfarin, Take one tablet by mouth daily or as directed by anticoagulation clinic  •  spironolactone, TAKE 1 TABLET BY MOUTH TWICE A DAY  •  pravastatin, 80 mg, Oral, QHS  •  pioglitazone, 30 mg, Oral, DAILY  •  metoprolol tartrate, 25 mg, Oral, BID  •  metformin, TAKE 1 TABLET BY MOUTH TWICE A DAY WITH MEALS  •  lisinopril, 5 mg, Oral, QDAY  •  Jardiance, 1 Tablet, Oral, DAILY  •  Trulicity, 0.5 mL, Subcutaneous, Q7 DAYS  •  Lantus SoloStar, INJECT 22 UNITS SUBCUTANEOUSLY EVERY EVENING AS DIRECTED BY PHYSICIAN  •  fluticasone, 1 Spray, Nasal, BID  •  neomycin-polymyxin-HC, Administer 8 Drops into the right ear.  •  ofloxacin otic sol, Administer 5 Drops into the right ear.  •   docosahexanoic acid, 1,000 mg, Oral, BID      Plan:     • Continue the same warfarin dose, as noted above.       Follow-up:     • Our protocol suggests we test in 4 weeks.        Additional information discussed with patient:     • Asked patient to please call the anticoagulation clinic if they have any signs/symptoms of bleeding and/or thrombosis or any changes to diet or medications.      National recommendations regarding anticoagulation therapy:     The CHEST guidelines recommends frequent INR monitoring at regular intervals (a few days up to a max of 12 weeks) to ensure patients are on the proper dose of warfarin, and patients are not having any complications from therapy.  INRs can dramatically change over a short time period due to diet, medications, and medical conditions.     [facility]  Phone [phone] fax [phone]    This note was created using voice recognition software (Dragon). The accuracy of the dictation is limited by the abilities of the software. I have reviewed the note prior to signing, however some errors in grammar and context are still possible. If you have any questions related to this note please do not hesitate to contact our office.

## 2022-02-10 ENCOUNTER — ANTICOAGULATION MONITORING (OUTPATIENT)
Dept: VASCULAR LAB | Facility: MEDICAL CENTER | Age: 67
End: 2022-02-10

## 2022-02-10 DIAGNOSIS — Z79.01 WARFARIN ANTICOAGULATION: ICD-10-CM

## 2022-02-14 ENCOUNTER — DOCUMENTATION (OUTPATIENT)
Dept: VASCULAR LAB | Facility: MEDICAL CENTER | Age: 67
End: 2022-02-14

## 2022-02-15 NOTE — PROGRESS NOTES
[facility]      Pt is over due for PT/INR for warfarin monitoring.   Attempted to leave a message for patient to have INR checked ASAP, but the VM was full.   Will send the patient a [employer] message.        Clinic phone number left for any questions or concerns.  [facility]   [phone]      Davion JonesD

## 2022-03-06 DIAGNOSIS — I10 ESSENTIAL HYPERTENSION: ICD-10-CM

## 2022-03-06 DIAGNOSIS — Z79.4 CONTROLLED TYPE 2 DIABETES MELLITUS WITHOUT COMPLICATION, WITH LONG-TERM CURRENT USE OF INSULIN (HCC): ICD-10-CM

## 2022-03-06 DIAGNOSIS — E11.9 CONTROLLED TYPE 2 DIABETES MELLITUS WITHOUT COMPLICATION, WITH LONG-TERM CURRENT USE OF INSULIN (HCC): ICD-10-CM

## 2022-03-06 DIAGNOSIS — I48.91 ATRIAL FIBRILLATION, UNSPECIFIED TYPE (HCC): ICD-10-CM

## 2022-03-06 DIAGNOSIS — E78.2 MIXED HYPERLIPIDEMIA: ICD-10-CM

## 2022-03-06 DIAGNOSIS — E55.9 VITAMIN D DEFICIENCY: ICD-10-CM

## 2022-03-06 LAB — INR PPP: 2 (ref 2–3.5)

## 2022-03-06 NOTE — PROGRESS NOTES
Controlled type 2 diabetes mellitus without complication, with long-term current use of insulin (HCC)  -     HEMOGLOBIN A1C; Future  -     MICROALBUMIN CREAT RATIO URINE; Future    Essential hypertension  -     CBC WITH DIFFERENTIAL; Future  -     Comp Metabolic Panel; Future  -     TSH WITH REFLEX TO FT4; Future    Mixed hyperlipidemia  -     Lipid Profile; Future  -     TSH WITH REFLEX TO FT4; Future    Vitamin D deficiency  -     VITAMIN D,25 HYDROXY; Future    Atrial fibrillation, unspecified type (HCC)  -     Prothrombin Time; Future

## 2022-03-07 ENCOUNTER — ANTICOAGULATION MONITORING (OUTPATIENT)
Dept: VASCULAR LAB | Facility: MEDICAL CENTER | Age: 67
End: 2022-03-07
Payer: COMMERCIAL

## 2022-03-07 DIAGNOSIS — Z79.01 WARFARIN ANTICOAGULATION: ICD-10-CM

## 2022-03-07 NOTE — PROGRESS NOTES
OP Anticoagulation Service Note    Date: 3/7/2022    Anticoagulation Summary  As of 3/7/2022    INR goal:  2.0-3.0   TTR:  50.1 % (10.4 mo)   INR used for dosin.00 (3/6/2022)   Warfarin maintenance plan:  11 mg (5 mg x 2 and 1 mg x 1) every day   Weekly warfarin total:  77 mg   Plan last modified:  Savanna Abreu, PharmD (2021)   Next INR check:  2022   Target end date:  Indefinite    Indications    Warfarin anticoagulation [Z79.01]  Atrial fibrillation (HCC) [I48.91]             Anticoagulation Episode Summary     INR check location:      Preferred lab:      Send INR reminders to:      Comments:  Alere      Anticoagulation Care Providers     Provider Role Specialty Phone number    Marlon Solorio M.D. Referring Internal Medicine [phone]        Anticoagulation Patient Findings      HPI:   The reason for today's call is to prevent morbidity and mortality from a blood clot and/or stroke and to reduce the risk of bleeding while on a anticoagulant.     PCP:  Marlon Solorio M.D.  [address] [facility] [address]  [facility] [address]    Assessment:     • INR  therapeutic.       Current Outpatient Medications:   •  warfarin, Take two tablets by mouth daily or as directed by anticoagulation clinic  •  warfarin, Take one tablet by mouth daily or as directed by anticoagulation clinic  •  spironolactone, TAKE 1 TABLET BY MOUTH TWICE A DAY  •  pravastatin, 80 mg, Oral, QHS  •  pioglitazone, 30 mg, Oral, DAILY  •  metoprolol tartrate, 25 mg, Oral, BID  •  metformin, TAKE 1 TABLET BY MOUTH TWICE A DAY WITH MEALS  •  lisinopril, 5 mg, Oral, QDAY  •  Jardiance, 1 Tablet, Oral, DAILY  •  Trulicity, 0.5 mL, Subcutaneous, Q7 DAYS  •  Lantus SoloStar, INJECT 22 UNITS SUBCUTANEOUSLY EVERY EVENING AS DIRECTED BY PHYSICIAN  •  fluticasone, 1 Spray, Nasal, BID  •  neomycin-polymyxin-HC, Administer 8 Drops into the right ear.  •  ofloxacin otic sol, Administer 5 Drops into the right ear.  •  docosahexanoic  acid, 1,000 mg, Oral, BID      Plan:     • Continue the same warfarin dose, as noted above.       Follow-up:     • Our protocol suggests we test in 4 weeks.        Additional information discussed with patient:     • Asked patient to please call the anticoagulation clinic if they have any signs/symptoms of bleeding and/or thrombosis or any changes to diet or medications.      National recommendations regarding anticoagulation therapy:     The CHEST guidelines recommends frequent INR monitoring at regular intervals (a few days up to a max of 12 weeks) to ensure patients are on the proper dose of warfarin, and patients are not having any complications from therapy.  INRs can dramatically change over a short time period due to diet, medications, and medical conditions.     [facility]  Phone [phone] fax [phone]    This note was created using voice recognition software (Dragon). The accuracy of the dictation is limited by the abilities of the software. I have reviewed the note prior to signing, however some errors in grammar and context are still possible. If you have any questions related to this note please do not hesitate to contact our office.

## 2022-03-31 ENCOUNTER — APPOINTMENT (OUTPATIENT)
Dept: MEDICAL GROUP | Facility: MEDICAL CENTER | Age: 67
End: 2022-03-31
Payer: COMMERCIAL

## 2022-04-16 ENCOUNTER — ANTICOAGULATION MONITORING (OUTPATIENT)
Dept: VASCULAR LAB | Facility: MEDICAL CENTER | Age: 67
End: 2022-04-16
Payer: COMMERCIAL

## 2022-04-16 DIAGNOSIS — Z79.01 WARFARIN ANTICOAGULATION: ICD-10-CM

## 2022-04-16 LAB — INR PPP: 1.8 (ref 2–3.5)

## 2022-04-18 NOTE — PROGRESS NOTES
Anticoagulation Summary  As of 2022    INR goal:  2.0-3.0   TTR:  44.3 % (11.7 mo)   INR used for dosin.80 (2022)   Warfarin maintenance plan:  11 mg (5 mg x 2 and 1 mg x 1) every day   Weekly warfarin total:  77 mg   Plan last modified:  Savanna Abreu, PharmD (2021)   Next INR check:  2022   Target end date:  Indefinite    Indications    Warfarin anticoagulation [Z79.01]  Atrial fibrillation (HCC) [I48.91]             Anticoagulation Episode Summary     INR check location:      Preferred lab:      Send INR reminders to:      Comments:  Alere  call each time.      Anticoagulation Care Providers     Provider Role Specialty Phone number    Marlon Solorio M.D. Referring Internal Medicine [phone]        Anticoagulation Patient Findings      Left voicemail message to report a subtherapeutic INR of 1.8.    Will have pt take bolus dose of warfarin today of 16 mg and then   Pt to continue with current warfarin dosing regimen. Requested pt contact the clinic for any s/s of unusual bleeding, bruising, clotting or any changes to diet or medication.    FU INR in 1 week(s).    Savanna Abreu, RobertD

## 2022-05-12 ENCOUNTER — TELEPHONE (OUTPATIENT)
Dept: VASCULAR LAB | Facility: MEDICAL CENTER | Age: 67
End: 2022-05-12
Payer: COMMERCIAL

## 2022-05-12 NOTE — TELEPHONE ENCOUNTER
Unable to leave vm message reminder to check INR.  Phone is out of service  Letter sent  Eunice Bell, Clinical Pharmacist, CDE, CACP

## 2022-05-12 NOTE — LETTER
Jethro Guillen  [address]  [facility] [address]    05/12/22    Dear Jethro Guillen ,    We have been unsuccessful in our attempts to contact you regarding your Anticoagulation Service appointments. Warfarin is a potent blood-thinning agent that requires monitoring to ensure that the dosage is correct for your body.  If it isn't, you could develop serious, sometimes life-threatening bleeding problems or life-threatening blood clots or stroke could result.    To monitor you effectively, we need to be able to communicate with you.  This is a requirement to be followed by our Service.       If you repeatedly fail to keep your lab appointments, you are at risk of being discharged from the Anticoagulation Service.    It is extremely important that you contact the clinic as soon as possible to arrange appropriate follow up.  We are open Monday-Friday 8 am until 5 pm.  You may reach our Service at [phone].           Sincerely,           Yemi Lutz, PharmD, [facility]  Clinic Supervisor  [facility]  Outpatient Anticoagulation Service

## 2022-05-16 ENCOUNTER — ANTICOAGULATION MONITORING (OUTPATIENT)
Dept: VASCULAR LAB | Facility: MEDICAL CENTER | Age: 67
End: 2022-05-16
Payer: COMMERCIAL

## 2022-05-16 DIAGNOSIS — Z79.01 WARFARIN ANTICOAGULATION: ICD-10-CM

## 2022-05-16 DIAGNOSIS — I48.91 ATRIAL FIBRILLATION, UNSPECIFIED TYPE (HCC): ICD-10-CM

## 2022-05-16 LAB — INR PPP: 3.2 (ref 2–3.5)

## 2022-05-16 NOTE — PROGRESS NOTES
Anticoagulation Summary  As of 5/16/2022    INR goal:  2.0-3.0   TTR:  46.4 % (1 y)   INR used for dosing:  3.20 (5/16/2022)   Warfarin maintenance plan:  11 mg (5 mg x 2 and 1 mg x 1) every day   Weekly warfarin total:  77 mg   Plan last modified:  Robert PortilloD (4/27/2021)   Next INR check:  6/13/2022   Target end date:  Indefinite    Indications    Warfarin anticoagulation [Z79.01]  Atrial fibrillation (HCC) [I48.91]             Anticoagulation Episode Summary     INR check location:      Preferred lab:      Send INR reminders to:      Comments:  Alere  call each time.      Anticoagulation Care Providers     Provider Role Specialty Phone number    JagdeepEmilie Kye Solorio M.D. Referring Internal Medicine [phone]        Anticoagulation Patient Findings  Patient Findings     Negatives:  Signs/symptoms of thrombosis, Signs/symptoms of bleeding, Laboratory test error suspected, Change in health, Change in alcohol use, Change in activity, Upcoming invasive procedure, Emergency department visit, Upcoming dental procedure, Missed doses, Extra doses, Change in medications, Change in diet/appetite, Hospital admission, Bruising, Other complaints         Spoke with patient today regarding supratherapeutic INR of 3.2.  Patient denies any signs/symptoms of bruising or bleeding or any changes in diet and medications.  Instructed patient to call clinic with any questions or concerns.    Pt is not on antiplatelet therapy    Instructed patient to decrease today's dose to 6mg, then resume current warfarin regimen.  Follow up in 4 weeks, to reduce risk of adverse events related to this high risk medication,  Warfarin.    Kye Farias, PharmD, BCACP

## 2022-06-24 ENCOUNTER — ANTICOAGULATION MONITORING (OUTPATIENT)
Dept: MEDICAL GROUP | Facility: PHYSICIAN GROUP | Age: 67
End: 2022-06-24
Payer: COMMERCIAL

## 2022-06-24 DIAGNOSIS — Z79.01 WARFARIN ANTICOAGULATION: ICD-10-CM

## 2022-06-24 DIAGNOSIS — I48.91 ATRIAL FIBRILLATION, UNSPECIFIED TYPE (HCC): ICD-10-CM

## 2022-06-24 LAB — INR PPP: 1.6 (ref 2–3.5)

## 2022-06-24 NOTE — PROGRESS NOTES
Anticoagulation Summary  As of 2022    INR goal:  2.0-3.0   TTR:  47.9 % (1.2 y)   INR used for dosin.60 (2022)   Warfarin maintenance plan:  11 mg (5 mg x 2 and 1 mg x 1) every day   Weekly warfarin total:  77 mg   Plan last modified:  Savanna Abreu, PharmD (2021)   Next INR check:  2022   Target end date:  Indefinite    Indications    Warfarin anticoagulation [Z79.01]  Atrial fibrillation (HCC) [I48.91]             Anticoagulation Episode Summary     INR check location:      Preferred lab:      Send INR reminders to:      Comments:  Alere  call each time.      Anticoagulation Care Providers     Provider Role Specialty Phone number    Marlon Solorio M.D. Referring Internal Medicine [phone]        Anticoagulation Patient Findings  Patient Findings     Positives:  Missed doses (missed 1 dose this week)    Negatives:  Signs/symptoms of thrombosis, Signs/symptoms of bleeding, Laboratory test error suspected, Change in health, Change in alcohol use, Change in activity, Upcoming invasive procedure, Emergency department visit, Upcoming dental procedure, Extra doses, Change in medications, Change in diet/appetite, Hospital admission, Bruising, Other complaints        Spoke with patient today regarding SUB-therapeutic INR of 1.60.  Patient denies any signs/symptoms of bruising or bleeding or any changes in diet and medications.  Instructed patient to call clinic with any questions or concerns.    Pt is not on antiplatelet therapy     Instructed pt to BOLUS 16 mg x 1 today, then resume current warfarin regimen as detailed above. Pt reports missed one dose this week.    Follow up in 2 weeks, to reduce risk of adverse events related to this high risk medication,  Warfarin.    Brian Hooper, Pharmacy Intern      I have reviewed and concur with the above plan.       Current Outpatient Medications:   •  spironolactone, TAKE 1 TABLET BY MOUTH TWICE A DAY  •  warfarin, Take two tablets by  mouth daily or as directed by anticoagulation clinic  •  warfarin, Take one tablet by mouth daily or as directed by anticoagulation clinic  •  pravastatin, 80 mg, Oral, QHS  •  pioglitazone, 30 mg, Oral, DAILY  •  metoprolol tartrate, 25 mg, Oral, BID  •  metformin, TAKE 1 TABLET BY MOUTH TWICE A DAY WITH MEALS  •  lisinopril, 5 mg, Oral, QDAY  •  Jardiance, 1 Tablet, Oral, DAILY  •  Trulicity, 0.5 mL, Subcutaneous, Q7 DAYS  •  Lantus SoloStar, INJECT 22 UNITS SUBCUTANEOUSLY EVERY EVENING AS DIRECTED BY PHYSICIAN  •  fluticasone, 1 Spray, Nasal, BID  •  neomycin-polymyxin-HC, Administer 8 Drops into the right ear.  •  ofloxacin otic sol, Administer 5 Drops into the right ear.  •  docosahexanoic acid, 1,000 mg, Oral, BID    Issa Sahu, PharmD, MS, BCACP, [facility]  Phone: [phone],  Fax: [phone]  On call: [phone]

## 2022-07-20 ENCOUNTER — ANTICOAGULATION MONITORING (OUTPATIENT)
Dept: VASCULAR LAB | Facility: MEDICAL CENTER | Age: 67
End: 2022-07-20
Payer: COMMERCIAL

## 2022-07-20 DIAGNOSIS — I48.91 ATRIAL FIBRILLATION, UNSPECIFIED TYPE (HCC): ICD-10-CM

## 2022-07-20 DIAGNOSIS — Z79.01 WARFARIN ANTICOAGULATION: ICD-10-CM

## 2022-07-20 LAB — INR PPP: 2.1 (ref 2–3.5)

## 2022-07-20 NOTE — PROGRESS NOTES
Anticoagulation Summary  As of 2022    INR goal:  2.0-3.0   TTR:  46.3 % (1.2 y)   INR used for dosin.10 (2022)   Warfarin maintenance plan:  11 mg (5 mg x 2 and 1 mg x 1) every day   Weekly warfarin total:  77 mg   Plan last modified:  Savanna Abreu, PharmD (2021)   Next INR check:  8/3/2022   Target end date:  Indefinite    Indications    Warfarin anticoagulation [Z79.01]  Atrial fibrillation (HCC) [I48.91]             Anticoagulation Episode Summary     INR check location:      Preferred lab:      Send INR reminders to:      Comments:  Alere  call each time.      Anticoagulation Care Providers     Provider Role Specialty Phone number    Marlon Solorio M.D. Referring Internal Medicine [phone]          Refer to Anticoagulation Patient Findings for HPI  Patient Findings     Negatives:  Signs/symptoms of thrombosis, Signs/symptoms of bleeding, Laboratory test error suspected, Change in health, Change in alcohol use, Change in activity, Upcoming invasive procedure, Emergency department visit, Upcoming dental procedure, Missed doses, Extra doses, Change in medications, Change in diet/appetite, Hospital admission, Bruising, Other complaints          Spoke with patient to report a therapeutic INR.      Pt is NOT on antiplatelet therapy.    Pt instructed to continue with current warfarin dosing regimen, confirms dosing.   Will follow up in 2 week(s).     Leonides De Souza

## 2022-07-25 DIAGNOSIS — Z79.01 WARFARIN ANTICOAGULATION: ICD-10-CM

## 2022-07-25 RX ORDER — WARFARIN SODIUM 1 MG/1
TABLET ORAL
Qty: 90 TABLET | Refills: 1 | Status: SHIPPED | OUTPATIENT
Start: 2022-07-25 | End: 2023-02-05 | Stop reason: SDUPTHER

## 2022-08-26 ENCOUNTER — DOCUMENTATION (OUTPATIENT)
Dept: VASCULAR LAB | Facility: MEDICAL CENTER | Age: 67
End: 2022-08-26
Payer: COMMERCIAL

## 2022-08-26 ENCOUNTER — ANTICOAGULATION MONITORING (OUTPATIENT)
Dept: MEDICAL GROUP | Facility: PHYSICIAN GROUP | Age: 67
End: 2022-08-26
Payer: COMMERCIAL

## 2022-08-26 DIAGNOSIS — I48.91 ATRIAL FIBRILLATION, UNSPECIFIED TYPE (HCC): ICD-10-CM

## 2022-08-26 DIAGNOSIS — Z79.01 WARFARIN ANTICOAGULATION: ICD-10-CM

## 2022-08-26 LAB — INR PPP: 2.1 (ref 2–3.5)

## 2022-08-26 NOTE — PROGRESS NOTES
Spoke with patient to remind him to check his INR. Patient states that he will check today (8/26/22) when he gets home.

## 2022-08-27 NOTE — PROGRESS NOTES
Anticoagulation Summary  As of 2022      INR goal:  2.0-3.0   TTR:  50.4 % (1.3 y)   INR used for dosin.10 (2022)   Warfarin maintenance plan:  11 mg (5 mg x 2 and 1 mg x 1) every day   Weekly warfarin total:  77 mg   Plan last modified:  Robert PortilloD (2021)   Next INR check:  2022   Target end date:  Indefinite    Indications    Warfarin anticoagulation [Z79.01]  Atrial fibrillation (HCC) [I48.91]                 Anticoagulation Episode Summary       INR check location:      Preferred lab:      Send INR reminders to:      Comments:  Alere  call each time.          Anticoagulation Care Providers       Provider Role Specialty Phone number    JagdeepEmilie Kye Solorio M.D. Referring Internal Medicine [phone]          Anticoagulation Patient Findings    Spoke with patient today regarding therapeutic INR of 2.1.  Patient denies any signs/symptoms of bruising or bleeding or any changes in diet and medications.  Instructed patient to call clinic with any questions or concerns.    Pt is not on antiplatelet therapy    Pt is to continue with current warfarin dosing regimen.  Follow up in 2 weeks, to reduce risk of adverse events related to this high risk medication,  Warfarin.    Kye Farias, Karlos, BCACP

## 2022-08-31 DIAGNOSIS — Z79.01 WARFARIN ANTICOAGULATION: ICD-10-CM

## 2022-08-31 RX ORDER — WARFARIN SODIUM 5 MG/1
TABLET ORAL
Qty: 180 TABLET | Refills: 1 | Status: SHIPPED | OUTPATIENT
Start: 2022-08-31 | End: 2023-03-27 | Stop reason: SDUPTHER

## 2022-09-07 RX ORDER — WARFARIN SODIUM 5 MG/1
TABLET ORAL
Qty: 180 TABLET | Refills: 1 | OUTPATIENT
Start: 2022-09-07

## 2022-10-05 ENCOUNTER — TELEPHONE (OUTPATIENT)
Dept: CARDIOLOGY | Facility: PHYSICIAN GROUP | Age: 67
End: 2022-10-05
Payer: COMMERCIAL

## 2022-10-06 ENCOUNTER — ANTICOAGULATION MONITORING (OUTPATIENT)
Dept: VASCULAR LAB | Facility: MEDICAL CENTER | Age: 67
End: 2022-10-06
Payer: COMMERCIAL

## 2022-10-06 DIAGNOSIS — Z79.01 WARFARIN ANTICOAGULATION: ICD-10-CM

## 2022-10-06 DIAGNOSIS — I48.91 ATRIAL FIBRILLATION, UNSPECIFIED TYPE (HCC): ICD-10-CM

## 2022-10-06 LAB — INR PPP: 2.3 (ref 2–3.5)

## 2022-10-07 NOTE — PROGRESS NOTES
Anticoagulation Summary  As of 10/6/2022      INR goal:  2.0-3.0   TTR:  54.3 % (1.4 y)   INR used for dosin.30 (10/6/2022)   Warfarin maintenance plan:  11 mg (5 mg x 2 and 1 mg x 1) every day   Weekly warfarin total:  77 mg   Plan last modified:  Savanna Abreu PharmD (2021)   Next INR check:  2022   Target end date:  Indefinite    Indications    Warfarin anticoagulation [Z79.01]  Atrial fibrillation (HCC) [I48.91]                 Anticoagulation Episode Summary       INR check location:      Preferred lab:      Send INR reminders to:      Comments:  Alere  call each time.          Anticoagulation Care Providers       Provider Role Specialty Phone number    Marlon Solorio M.D. Referring Internal Medicine [phone]            Refer to Anticoagulation Patient Findings for HPI  Patient Findings       Negatives:  Signs/symptoms of thrombosis, Signs/symptoms of bleeding, Laboratory test error suspected, Change in health, Change in alcohol use, Change in activity, Upcoming invasive procedure, Emergency department visit, Upcoming dental procedure, Missed doses, Extra doses, Change in medications, Change in diet/appetite, Hospital admission, Bruising, Other complaints            Spoke with patient to report a therapeutic INR.      Pt instructed to continue with current warfarin dosing regimen, confirms dosing.     Will follow up in 4 week(s).     Gonzalo Kimble PharmD, BCACP

## 2022-11-22 ENCOUNTER — TELEPHONE (OUTPATIENT)
Dept: MEDICAL GROUP | Facility: MEDICAL CENTER | Age: 67
End: 2022-11-22
Payer: COMMERCIAL

## 2022-11-22 NOTE — TELEPHONE ENCOUNTER
Phone Number Called: [phone] (home) [phone] (work)      Call outcome: Spoke to patient regarding message below.    Message: Spoke with pt Jethro he said he will cb sometime after the new year and re jaci with new provider then.

## 2022-12-08 ENCOUNTER — ANTICOAGULATION MONITORING (OUTPATIENT)
Dept: VASCULAR LAB | Facility: MEDICAL CENTER | Age: 67
End: 2022-12-08
Payer: COMMERCIAL

## 2022-12-08 DIAGNOSIS — I48.91 ATRIAL FIBRILLATION, UNSPECIFIED TYPE (HCC): ICD-10-CM

## 2022-12-08 DIAGNOSIS — Z79.01 WARFARIN ANTICOAGULATION: ICD-10-CM

## 2022-12-08 LAB — INR PPP: 1.5 (ref 2–3.5)

## 2022-12-09 NOTE — PROGRESS NOTES
Anticoagulation Summary  As of 2022      INR goal:  2.0-3.0   TTR:  52.5 % (1.6 y)   INR used for dosin.50 (2022)   Warfarin maintenance plan:  11 mg (5 mg x 2 and 1 mg x 1) every day; Starting 2022   Weekly warfarin total:  77 mg   Plan last modified:  Savanna Abreu, RobertD (2021)   Next INR check:  12/15/2022   Target end date:  Indefinite    Indications    Warfarin anticoagulation [Z79.01]  Atrial fibrillation (HCC) [I48.91]                 Anticoagulation Episode Summary       INR check location:      Preferred lab:      Send INR reminders to:      Comments:  Alere  call each time.          Anticoagulation Care Providers       Provider Role Specialty Phone number    Marlon Solorio M.D. Referring Internal Medicine [phone]          Anticoagulation Patient Findings          HPI:  Jethro Damon Wilmer, on anticoagulation therapy with warfarin for afib.   Changes to current medical/health status since last appt: none  Denies signs/symptoms of bleeding and/or thrombosis since the last appt.    Denies any interval changes to diet  Denies any interval changes to medications since last appt.   Denies any complications or cost restrictions with current therapy.     Pt missed warfarin doses.     A/P   INR  SUB-therapeutic.   Bolus today then Pt is to continue with current warfarin dosing regimen.      Next INR in 1 week(s).    Yemi Lutz, PharmD

## 2022-12-25 ENCOUNTER — PATIENT MESSAGE (OUTPATIENT)
Dept: SLEEP MEDICINE | Facility: MEDICAL CENTER | Age: 67
End: 2022-12-25
Payer: COMMERCIAL

## 2022-12-27 ENCOUNTER — ANTICOAGULATION MONITORING (OUTPATIENT)
Dept: CARDIOLOGY | Facility: MEDICAL CENTER | Age: 67
End: 2022-12-27
Payer: COMMERCIAL

## 2022-12-27 DIAGNOSIS — Z79.01 WARFARIN ANTICOAGULATION: ICD-10-CM

## 2022-12-27 DIAGNOSIS — I48.91 ATRIAL FIBRILLATION, UNSPECIFIED TYPE (HCC): ICD-10-CM

## 2022-12-27 LAB — INR PPP: 3.4 (ref 2–3.5)

## 2022-12-28 NOTE — PROGRESS NOTES
Anticoagulation Summary  As of 12/27/2022      INR goal:  2.0-3.0   TTR:  52.5 % (1.7 y)   INR used for dosing:  3.40 (12/27/2022)   Warfarin maintenance plan:  11 mg (5 mg x 2 and 1 mg x 1) every day   Weekly warfarin total:  77 mg   Plan last modified:  Savanna Abreu PharmD (4/27/2021)   Next INR check:  1/10/2023   Target end date:  Indefinite    Indications    Warfarin anticoagulation [Z79.01]  Atrial fibrillation (HCC) [I48.91]                 Anticoagulation Episode Summary       INR check location:      Preferred lab:      Send INR reminders to:      Comments:  Alere  call each time.          Anticoagulation Care Providers       Provider Role Specialty Phone number    Marlon Solorio M.D. Referring Internal Medicine [phone]            Refer to Anticoagulation Patient Findings for HPI  Patient Findings       Negatives:  Signs/symptoms of thrombosis, Signs/symptoms of bleeding, Laboratory test error suspected, Change in health, Change in alcohol use, Change in activity, Upcoming invasive procedure, Emergency department visit, Upcoming dental procedure, Missed doses, Extra doses, Change in medications, Change in diet/appetite, Hospital admission, Bruising, Other complaints            Spoke with pt.  INR is supratherapeutic.     Pt verifies warfarin weekly dosing.     Will have pt reduce today's dose to 5 mg then continue regimen    Repeat INR in 2 week(s).     Savanna Abreu, RobertD

## 2022-12-29 ENCOUNTER — PATIENT MESSAGE (OUTPATIENT)
Dept: SLEEP MEDICINE | Facility: MEDICAL CENTER | Age: 67
End: 2022-12-29
Payer: COMMERCIAL

## 2023-01-19 ENCOUNTER — ANTICOAGULATION MONITORING (OUTPATIENT)
Dept: VASCULAR LAB | Facility: MEDICAL CENTER | Age: 68
End: 2023-01-19

## 2023-01-19 DIAGNOSIS — Z79.01 WARFARIN ANTICOAGULATION: ICD-10-CM

## 2023-01-19 DIAGNOSIS — I48.91 ATRIAL FIBRILLATION, UNSPECIFIED TYPE (HCC): ICD-10-CM

## 2023-01-19 LAB — INR PPP: 2.5 (ref 2–3.5)

## 2023-01-20 NOTE — PROGRESS NOTES
Anticoagulation Summary  As of 2023      INR goal:  2.0-3.0   TTR:  52.6 % (1.7 y)   INR used for dosin.50 (2023)   Warfarin maintenance plan:  11 mg (5 mg x 2 and 1 mg x 1) every day   Weekly warfarin total:  77 mg   Plan last modified:  Savanna Abreu PharmD (2021)   Next INR check:  2023   Target end date:  Indefinite    Indications    Warfarin anticoagulation [Z79.01]  Atrial fibrillation (HCC) [I48.91]                 Anticoagulation Episode Summary       INR check location:      Preferred lab:      Send INR reminders to:      Comments:  Alere  call each time.          Anticoagulation Care Providers       Provider Role Specialty Phone number    Marlon Solorio M.D. Referring Internal Medicine [phone]            Refer to Anticoagulation Patient Findings for HPI  Patient Findings       Negatives:  Signs/symptoms of thrombosis, Signs/symptoms of bleeding, Laboratory test error suspected, Change in health, Change in alcohol use, Change in activity, Upcoming invasive procedure, Emergency department visit, Upcoming dental procedure, Missed doses, Extra doses, Change in medications, Change in diet/appetite, Hospital admission, Bruising, Other complaints            Spoke with patient to report a therapeutic INR.      Pt instructed to continue with current warfarin dosing regimen, confirms dosing.     Will follow up in 3 week(s).     Gonzalo Kimble PharmD, BCACP

## 2023-02-09 ENCOUNTER — ANTICOAGULATION MONITORING (OUTPATIENT)
Dept: VASCULAR LAB | Facility: MEDICAL CENTER | Age: 68
End: 2023-02-09

## 2023-02-09 DIAGNOSIS — I48.91 ATRIAL FIBRILLATION, UNSPECIFIED TYPE (HCC): ICD-10-CM

## 2023-02-09 DIAGNOSIS — Z79.01 WARFARIN ANTICOAGULATION: ICD-10-CM

## 2023-02-09 LAB — INR PPP: 3.2 (ref 2–3.5)

## 2023-02-09 NOTE — PROGRESS NOTES
Anticoagulation Summary  As of 2/9/2023      INR goal:  2.0-3.0   TTR:  53.2 % (1.8 y)   INR used for dosing:  3.20 (2/9/2023)   Warfarin maintenance plan:  11 mg (5 mg x 2 and 1 mg x 1) every day   Weekly warfarin total:  77 mg   Plan last modified:  Savanna Abreu PharmD (4/27/2021)   Next INR check:  2/23/2023   Target end date:  Indefinite    Indications    Warfarin anticoagulation [Z79.01]  Atrial fibrillation (HCC) [I48.91]                 Anticoagulation Episode Summary       INR check location:      Preferred lab:      Send INR reminders to:      Comments:  Alere  call each time.          Anticoagulation Care Providers       Provider Role Specialty Phone number    Marlon Solorio M.D. Referring Internal Medicine [phone]            Refer to Anticoagulation Patient Findings for HPI  Patient Findings       Negatives:  Signs/symptoms of thrombosis, Signs/symptoms of bleeding, Laboratory test error suspected, Change in health, Change in alcohol use, Change in activity, Upcoming invasive procedure, Emergency department visit, Upcoming dental procedure, Missed doses, Extra doses, Change in medications, Change in diet/appetite, Hospital admission, Bruising, Other complaints            Spoke with Patient.  INR is SUPRA-therapeutic, last INR therapeutic but previously supra therapeutic. Patient may need dose adjustment.     Pt verifies warfarin weekly dosing.     Pt is NOT on antiplatelet therapy    Will have pt reduce today's dose of warfarin to 6 mg and resume 11 mg daily dosage.     Repeat INR in 2 week(s).     Chantale Martino, RobertD

## 2023-02-24 ENCOUNTER — ANTICOAGULATION MONITORING (OUTPATIENT)
Dept: MEDICAL GROUP | Facility: PHYSICIAN GROUP | Age: 68
End: 2023-02-24

## 2023-02-24 DIAGNOSIS — I48.91 ATRIAL FIBRILLATION, UNSPECIFIED TYPE (HCC): ICD-10-CM

## 2023-02-24 DIAGNOSIS — Z79.01 WARFARIN ANTICOAGULATION: ICD-10-CM

## 2023-02-24 LAB — INR PPP: 3.4 (ref 2–3.5)

## 2023-02-24 NOTE — PROGRESS NOTES
OP Anticoagulation Service Note    Date: 2/24/2023    Anticoagulation Summary  As of 2/24/2023      INR goal:  2.0-3.0   TTR:  52.0 % (1.8 y)   INR used for dosing:  3.40 (2/24/2023)   Warfarin maintenance plan:  10 mg (5 mg x 2) every Mon, Wed, Fri; 11 mg (5 mg x 2 and 1 mg x 1) all other days   Weekly warfarin total:  74 mg   Plan last modified:  Issa Sahu, PharmD (2/24/2023)   Next INR check:  3/9/2023   Target end date:  Indefinite    Indications    Warfarin anticoagulation [Z79.01]  Atrial fibrillation (HCC) [I48.91]                 Anticoagulation Episode Summary       INR check location:      Preferred lab:      Send INR reminders to:      Comments:  Alere  call each time.          Anticoagulation Care Providers       Provider Role Specialty Phone number    Marlon Solorio M.D. Referring Internal Medicine [phone]          Anticoagulation Patient Findings        Patient's preferred phone number:  [phone]        HPI:   The reason for today's call is to prevent morbidity and mortality from a blood clot and/or stroke and to reduce the risk of bleeding while on a anticoagulant.     PCP:  No primary care provider on file.  No primary provider on file.    Assessment:     INR  supra-therapeutic.     Lab Results   Component Value Date/Time    BUN 19 08/27/2021 11:38 AM    CREATININE 1.02 08/27/2021 11:38 AM     Lab Results   Component Value Date/Time    HEMOGLOBIN 16.3 08/27/2021 11:38 AM    HEMATOCRIT 48.8 08/27/2021 11:38 AM    PLATELETCT 131 (L) 08/27/2021 11:38 AM    ALKPHOSPHAT 85 06/04/2021 11:34 AM    ASTSGOT 45 06/04/2021 11:34 AM    ALTSGPT 39 06/04/2021 11:34 AM          Current Outpatient Medications:     warfarin, Take one tablet by mouth daily or as directed by anticoagulation clinic    warfarin, Take two tablets by mouth daily or as directed by anticoagulation clinic    spironolactone, TAKE 1 TABLET BY MOUTH TWICE A DAY    pravastatin, 80 mg, Oral, QHS    pioglitazone, 30 mg,  Oral, DAILY    metoprolol tartrate, 25 mg, Oral, BID    metformin, TAKE 1 TABLET BY MOUTH TWICE A DAY WITH MEALS    lisinopril, 5 mg, Oral, QDAY    Jardiance, 1 Tablet, Oral, DAILY    Trulicity, 0.5 mL, Subcutaneous, Q7 DAYS    Lantus SoloStar, INJECT 22 UNITS SUBCUTANEOUSLY EVERY EVENING AS DIRECTED BY PHYSICIAN    fluticasone, 1 Spray, Nasal, BID    neomycin-polymyxin-HC, Administer 8 Drops into the right ear.    ofloxacin otic sol, Administer 5 Drops into the right ear.    docosahexanoic acid, 1,000 mg, Oral, BID      Plan:     Decrease weekly warfarin dose as noted above.       Follow-up:     On date seen above    Additional information discussed with patient:     Asked patient to please call the anticoagulation clinic if they have any signs/symptoms of bleeding and/or thrombosis or any changes to diet or medications.      National recommendations regarding anticoagulation therapy:     The CHEST guidelines recommends frequent INR monitoring at regular intervals (a few days up to a max of 12 weeks) to ensure patients are on the proper dose of warfarin, and patients are not having any complications from therapy.  INRs can dramatically change over a short time period due to diet, medications, and medical conditions.       Issa Sahu, PharmD, MS, BCACP, C  [facility]  Phone: [phone]  Fax: [phone]  On call: [phone]  General scheduling/information [phone]  For emergencies please dial 911  Please do not use [employer] (formerly [employer]) for urgent matters, call the phone numbers listed above.    This note was created using voice recognition software (Dragon). The accuracy of the dictation is limited by the abilities of the software. I have reviewed the note prior to signing, however some errors in grammar and context are still possible. If you have any questions related to this note please do not hesitate to contact our office.

## 2023-03-13 ENCOUNTER — ANTICOAGULATION MONITORING (OUTPATIENT)
Dept: VASCULAR LAB | Facility: MEDICAL CENTER | Age: 68
End: 2023-03-13

## 2023-03-13 DIAGNOSIS — I48.91 ATRIAL FIBRILLATION, UNSPECIFIED TYPE (HCC): ICD-10-CM

## 2023-03-13 DIAGNOSIS — Z79.01 WARFARIN ANTICOAGULATION: ICD-10-CM

## 2023-03-13 LAB — INR PPP: 3.2 (ref 2–3.5)

## 2023-03-13 NOTE — PROGRESS NOTES
OP Anticoagulation Service Note    Date: 3/13/2023    Anticoagulation Summary  As of 3/13/2023      INR goal:  2.0-3.0   TTR:  50.7 % (1.9 y)   INR used for dosing:  3.20 (3/13/2023)   Warfarin maintenance plan:  10 mg (5 mg x 2) every Mon, Wed, Fri; 11 mg (5 mg x 2 and 1 mg x 1) all other days   Weekly warfarin total:  74 mg   Plan last modified:  Issa Sahu, PharmD (2/24/2023)   Next INR check:  4/3/2023   Target end date:  Indefinite    Indications    Warfarin anticoagulation [Z79.01]  Atrial fibrillation (HCC) [I48.91]                 Anticoagulation Episode Summary       INR check location:      Preferred lab:      Send INR reminders to:      Comments:  Alere  call each time.          Anticoagulation Care Providers       Provider Role Specialty Phone number    Marlon Solorio M.D. Referring Internal Medicine [phone]          Anticoagulation Patient Findings        Patient's preferred phone number:  [phone]        HPI:   The reason for today's call is to prevent morbidity and mortality from a blood clot and/or stroke and to reduce the risk of bleeding while on a anticoagulant.     PCP:  No primary care provider on file.  No primary provider on file.    Assessment:     INR supra-therapeutic.     Lab Results   Component Value Date/Time    BUN 19 08/27/2021 11:38 AM    CREATININE 1.02 08/27/2021 11:38 AM     Lab Results   Component Value Date/Time    HEMOGLOBIN 16.3 08/27/2021 11:38 AM    HEMATOCRIT 48.8 08/27/2021 11:38 AM    PLATELETCT 131 (L) 08/27/2021 11:38 AM    ALKPHOSPHAT 85 06/04/2021 11:34 AM    ASTSGOT 45 06/04/2021 11:34 AM    ALTSGPT 39 06/04/2021 11:34 AM          Current Outpatient Medications:     warfarin, Take one tablet by mouth daily or as directed by anticoagulation clinic    warfarin, Take two tablets by mouth daily or as directed by anticoagulation clinic    spironolactone, TAKE 1 TABLET BY MOUTH TWICE A DAY    pravastatin, 80 mg, Oral, QHS    pioglitazone, 30 mg,  Oral, DAILY    metoprolol tartrate, 25 mg, Oral, BID    metformin, TAKE 1 TABLET BY MOUTH TWICE A DAY WITH MEALS    lisinopril, 5 mg, Oral, QDAY    Jardiance, 1 Tablet, Oral, DAILY    Trulicity, 0.5 mL, Subcutaneous, Q7 DAYS    Lantus SoloStar, INJECT 22 UNITS SUBCUTANEOUSLY EVERY EVENING AS DIRECTED BY PHYSICIAN    fluticasone, 1 Spray, Nasal, BID    neomycin-polymyxin-HC, Administer 8 Drops into the right ear.    ofloxacin otic sol, Administer 5 Drops into the right ear.    docosahexanoic acid, 1,000 mg, Oral, BID      Plan:     5 mg today then resume    Follow-up:     On date seen above    Additional information discussed with patient:     Asked patient to please call the anticoagulation clinic if they have any signs/symptoms of bleeding and/or thrombosis or any changes to diet or medications.      National recommendations regarding anticoagulation therapy:     The CHEST guidelines recommends frequent INR monitoring at regular intervals (a few days up to a max of 12 weeks) to ensure patients are on the proper dose of warfarin, and patients are not having any complications from therapy.  INRs can dramatically change over a short time period due to diet, medications, and medical conditions.       Issa Sahu, PharmD, MS, BCACP, LCC  [facility]  Phone: [phone]  Fax: [phone]  On call: [phone]  General scheduling/information [phone]  For emergencies please dial [phone]  Please do not use [employer] for urgent matters, call the phone numbers listed above.    This note was created using voice recognition software (Dragon). The accuracy of the dictation is limited by the abilities of the software. I have reviewed the note prior to signing, however some errors in grammar and context are still possible. If you have any questions related to this note please do not hesitate to contact our office.

## 2023-04-25 LAB — INR PPP: 2.4 (ref 2–3.5)

## 2023-04-26 ENCOUNTER — ANTICOAGULATION MONITORING (OUTPATIENT)
Dept: VASCULAR LAB | Facility: MEDICAL CENTER | Age: 68
End: 2023-04-26

## 2023-04-26 DIAGNOSIS — Z79.01 WARFARIN ANTICOAGULATION: ICD-10-CM

## 2023-04-26 DIAGNOSIS — I48.91 ATRIAL FIBRILLATION, UNSPECIFIED TYPE (HCC): ICD-10-CM

## 2023-04-26 NOTE — PROGRESS NOTES
Anticoagulation Summary  As of 2023      INR goal:  2.0-3.0   TTR:  52.1 % (2 y)   INR used for dosin.40 (2023)   Warfarin maintenance plan:  10 mg (5 mg x 2) every Mon, Wed, Fri; 11 mg (5 mg x 2 and 1 mg x 1) all other days   Weekly warfarin total:  74 mg   Plan last modified:  Issa Sahu, PharmD (2023)   Next INR check:  2023   Target end date:  Indefinite    Indications    Warfarin anticoagulation [Z79.01]  Atrial fibrillation (HCC) [I48.91]                 Anticoagulation Episode Summary       INR check location:      Preferred lab:      Send INR reminders to:      Comments:  Alere  call each time.          Anticoagulation Care Providers       Provider Role Specialty Phone number    Marlon Solorio M.D. Referring Internal Medicine [phone]          Anticoagulation Patient Findings      Left voicemail message to report a therapeutic INR.      Pt to continue with current warfarin dosing regimen. Requested pt contact the clinic for any s/s of unusual bleeding, bruising, clotting or any changes to diet or medication.    FU INR in 3 week(s).    Yemi Skaggs, Pharmacy Intern

## 2023-06-15 NOTE — Clinical Note
January 9, 2017        Jethro Guillen  [address] [place] [address]        Dear Jethro:    Our office has attempted to contact you via phone multiple times and unfortunately, have been unable to speak with you. Here are your recent results:    INR is in therapeutic range. Continue current dose of Coumadin. Recheck INR in 1 month.    If you have any questions or concerns, please don't hesitate to call.        Sincerely,        Tresa Brewster   Practice Supervisor     Electronically Signed     
sips of H2O with Famotidine

## 2023-06-26 LAB — INR PPP: 2.7 (ref 2–3.5)

## 2023-06-27 ENCOUNTER — ANTICOAGULATION MONITORING (OUTPATIENT)
Dept: CARDIOLOGY | Facility: MEDICAL CENTER | Age: 68
End: 2023-06-27

## 2023-06-27 DIAGNOSIS — Z79.01 WARFARIN ANTICOAGULATION: ICD-10-CM

## 2023-06-27 DIAGNOSIS — I48.91 ATRIAL FIBRILLATION, UNSPECIFIED TYPE (HCC): ICD-10-CM

## 2023-06-27 NOTE — PROGRESS NOTES
OP Anticoagulation Service Note    Date: 2023    Anticoagulation Summary  As of 2023      INR goal:  2.0-3.0   TTR:  55.9 % (2.2 y)   INR used for dosin.70 (2023)   Warfarin maintenance plan:  10 mg (5 mg x 2) every Mon, Wed, Fri; 11 mg (5 mg x 2 and 1 mg x 1) all other days   Weekly warfarin total:  74 mg   Plan last modified:  Issa Sahu, PharmD (2023)   Next INR check:  2023   Target end date:  Indefinite    Indications    Warfarin anticoagulation [Z79.01]  Atrial fibrillation (HCC) [I48.91]                 Anticoagulation Episode Summary       INR check location:      Preferred lab:      Send INR reminders to:      Comments:  Alere  call each time.          Anticoagulation Care Providers       Provider Role Specialty Phone number    Marlon Solorio M.D. Referring Internal Medicine [phone]          Anticoagulation Patient Findings        Patient's preferred phone number:  [phone]        HPI:   The reason for today's call is to prevent morbidity and mortality from a blood clot and/or stroke and to reduce the risk of bleeding while on a anticoagulant.     PCP:  No primary care provider on file.  No primary provider on file.    Assessment:     INR  therapeutic.     Lab Results   Component Value Date/Time    BUN 19 2021 11:38 AM    CREATININE 1.02 2021 11:38 AM     Lab Results   Component Value Date/Time    HEMOGLOBIN 16.3 2021 11:38 AM    HEMATOCRIT 48.8 2021 11:38 AM    PLATELETCT 131 (L) 2021 11:38 AM    ALKPHOSPHAT 85 2021 11:34 AM    ASTSGOT 45 2021 11:34 AM    ALTSGPT 39 2021 11:34 AM          Current Outpatient Medications:     warfarin, Take one to two tablets by mouth daily or as directed by anticoagulation clinic    warfarin, Take one tablet by mouth daily or as directed by anticoagulation clinic    spironolactone, TAKE 1 TABLET BY MOUTH TWICE A DAY    pravastatin, 80 mg, Oral, QHS    pioglitazone, 30 mg,  Oral, DAILY    metoprolol tartrate, 25 mg, Oral, BID    metformin, TAKE 1 TABLET BY MOUTH TWICE A DAY WITH MEALS    lisinopril, 5 mg, Oral, QDAY    Jardiance, 1 Tablet, Oral, DAILY    Trulicity, 0.5 mL, Subcutaneous, Q7 DAYS    Lantus SoloStar, INJECT 22 UNITS SUBCUTANEOUSLY EVERY EVENING AS DIRECTED BY PHYSICIAN    fluticasone, 1 Spray, Nasal, BID    neomycin-polymyxin-HC, Administer 8 Drops into the right ear.    ofloxacin otic sol, Administer 5 Drops into the right ear.    docosahexanoic acid, 1,000 mg, Oral, BID      Plan:     Continue the same warfarin dose, as noted above.       Follow-up:     As seen above      Additional information discussed with patient:     Asked patient to please call the anticoagulation clinic if they have any signs/symptoms of bleeding and/or thrombosis or any changes to diet or medications.      National recommendations regarding anticoagulation therapy:     The CHEST guidelines recommends frequent INR monitoring at regular intervals (a few days up to a max of 12 weeks) to ensure patients are on the proper dose of warfarin, and patients are not having any complications from therapy.  INRs can dramatically change over a short time period due to diet, medications, and medical conditions.         [facility]  Phone: [phone]  Fax: [phone]  On call: [phone]  General scheduling/information [phone]  For emergencies please dial [phone]  Please do not use [employer] for urgent matters, call the phone numbers listed above.    This note was created using voice recognition software (Dragon). The accuracy of the dictation is limited by the abilities of the software. I have reviewed the note prior to signing, however some errors in grammar and context are still possible. If you have any questions related to this note please do not hesitate to contact our office.

## 2023-08-04 LAB — INR PPP: 3.5 (ref 2–3.5)

## 2023-08-07 ENCOUNTER — ANTICOAGULATION MONITORING (OUTPATIENT)
Dept: VASCULAR LAB | Facility: MEDICAL CENTER | Age: 68
End: 2023-08-07

## 2023-08-07 DIAGNOSIS — Z79.01 WARFARIN ANTICOAGULATION: ICD-10-CM

## 2023-08-07 DIAGNOSIS — I48.91 ATRIAL FIBRILLATION, UNSPECIFIED TYPE (HCC): ICD-10-CM

## 2023-08-07 NOTE — PROGRESS NOTES
Anticoagulation Summary  As of 8/7/2023      INR goal:  2.0-3.0   TTR:  55.0 % (2.3 y)   INR used for dosing:  3.50 (8/4/2023)   Warfarin maintenance plan:  10 mg (5 mg x 2) every Mon, Wed, Fri; 11 mg (5 mg x 2 and 1 mg x 1) all other days   Weekly warfarin total:  74 mg   Plan last modified:  Robert KatD (2/24/2023)   Next INR check:  8/14/2023   Target end date:  Indefinite    Indications    Warfarin anticoagulation [Z79.01]  Atrial fibrillation (HCC) [I48.91]                 Anticoagulation Episode Summary       INR check location:      Preferred lab:      Send INR reminders to:      Comments:  Alere  call each time.          Anticoagulation Care Providers       Provider Role Specialty Phone number    Marlon Solorio M.D. Referring Internal Medicine [phone]          Anticoagulation Patient Findings            HPI:  Jethro Guillen, on anticoagulation therapy with warfarin for Afib.   Changes to current medical/health status since last appt: none  Denies signs/symptoms of bleeding and/or thrombosis since the last appt.    Denies any interval changes to diet  Denies any interval changes to medications since last appt.   Denies any complications or cost restrictions with current therapy.     A/P   INR  SUPRA-therapeutic.   Reduce today then Pt is to continue with current warfarin dosing regimen.     Next INR in 1 week(s).    Yemi Lutz, RobertD

## 2023-08-21 DIAGNOSIS — Z79.01 WARFARIN ANTICOAGULATION: ICD-10-CM

## 2023-08-21 RX ORDER — WARFARIN SODIUM 5 MG/1
TABLET ORAL
Qty: 180 TABLET | Refills: 1 | Status: SHIPPED | OUTPATIENT
Start: 2023-08-21

## 2023-09-07 ENCOUNTER — ANTICOAGULATION MONITORING (OUTPATIENT)
Dept: VASCULAR LAB | Facility: MEDICAL CENTER | Age: 68
End: 2023-09-07

## 2023-09-07 DIAGNOSIS — Z79.01 WARFARIN ANTICOAGULATION: ICD-10-CM

## 2023-09-07 DIAGNOSIS — I48.91 ATRIAL FIBRILLATION, UNSPECIFIED TYPE (HCC): ICD-10-CM

## 2023-09-07 LAB — INR PPP: 1.7 (ref 2–3.5)

## 2023-09-07 NOTE — PROGRESS NOTES
Anticoagulation Summary  As of 2023      INR goal:  2.0-3.0   TTR:  55.1 % (2.4 y)   INR used for dosin.70 (2023)   Warfarin maintenance plan:  10 mg (5 mg x 2) every Mon, Wed, Fri; 11 mg (5 mg x 2 and 1 mg x 1) all other days   Weekly warfarin total:  74 mg   Plan last modified:  Issa Sahu PharmD (2023)   Next INR check:  2023   Target end date:  Indefinite    Indications    Warfarin anticoagulation [Z79.01]  Atrial fibrillation (HCC) [I48.91]                 Anticoagulation Episode Summary       INR check location:      Preferred lab:      Send INR reminders to:      Comments:  Alere  call each time.          Anticoagulation Care Providers       Provider Role Specialty Phone number    Marlon Solorio M.D. Referring Internal Medicine [phone]          Anticoagulation Patient Findings  Patient Findings       Positives:  Missed doses    Negatives:  Signs/symptoms of thrombosis, Signs/symptoms of bleeding, Laboratory test error suspected, Change in health, Change in alcohol use, Change in activity, Upcoming invasive procedure, Emergency department visit, Upcoming dental procedure, Extra doses, Change in medications, Change in diet/appetite, Hospital admission, Bruising, Other complaints            INR subtherapeutic    Called and spoke to patient.      Warfarin Plan: Bolus with 15mg tonight, then continue regimen    Pt is not on antiplatelet therapy.    Next INR in 2 week(s).    Savanna Abreu, RobertD

## 2023-10-02 ENCOUNTER — APPOINTMENT (OUTPATIENT)
Dept: VASCULAR LAB | Facility: MEDICAL CENTER | Age: 68
End: 2023-10-02
Attending: INTERNAL MEDICINE

## 2023-10-13 ENCOUNTER — ANTICOAGULATION MONITORING (OUTPATIENT)
Dept: MEDICAL GROUP | Facility: PHYSICIAN GROUP | Age: 68
End: 2023-10-13

## 2023-10-13 DIAGNOSIS — I48.91 ATRIAL FIBRILLATION, UNSPECIFIED TYPE (HCC): ICD-10-CM

## 2023-10-13 DIAGNOSIS — Z79.01 WARFARIN ANTICOAGULATION: ICD-10-CM

## 2023-10-13 LAB — INR PPP: 4.2 (ref 2–3.5)

## 2023-10-13 NOTE — PROGRESS NOTES
Anticoagulation Summary  As of 10/13/2023      INR goal:  2.0-3.0   TTR:  54.5 % (2.5 y)   INR used for dosin.20 (10/13/2023)   Warfarin maintenance plan:  10 mg (5 mg x 2) every Mon, Wed, Fri; 11 mg (5 mg x 2 and 1 mg x 1) all other days   Weekly warfarin total:  74 mg   Plan last modified:  Issa Sahu PharmD (2023)   Next INR check:     Target end date:  Indefinite    Indications    Warfarin anticoagulation [Z79.01]  Atrial fibrillation (HCC) [I48.91]                 Anticoagulation Episode Summary       INR check location:      Preferred lab:      Send INR reminders to:      Comments:  Alere  call each time.          Anticoagulation Care Providers       Provider Role Specialty Phone number    Marlon Solorio M.D. Referring Internal Medicine [phone]          Anticoagulation Patient Findings  Patient Findings       Negatives:  Signs/symptoms of thrombosis, Signs/symptoms of bleeding, Laboratory test error suspected, Change in health, Change in alcohol use, Change in activity, Upcoming invasive procedure, Emergency department visit, Upcoming dental procedure, Missed doses, Extra doses, Change in medications, Change in diet/appetite, Hospital admission, Bruising, Other complaints            Called and spoke to patient.    INR supratherapeutic without obvious causes    Warfarin Plan: Hold today and tomorrow, then Continue regimen as listed above.    Next INR in 2 week(s).    Robert FloresD, BCACP

## 2023-11-02 ENCOUNTER — ANTICOAGULATION MONITORING (OUTPATIENT)
Dept: VASCULAR LAB | Facility: MEDICAL CENTER | Age: 68
End: 2023-11-02

## 2023-11-02 DIAGNOSIS — I48.91 ATRIAL FIBRILLATION, UNSPECIFIED TYPE (HCC): ICD-10-CM

## 2023-11-02 DIAGNOSIS — Z79.01 WARFARIN ANTICOAGULATION: ICD-10-CM

## 2023-11-02 LAB — INR PPP: 2.8 (ref 2–3.5)

## 2023-11-02 NOTE — PROGRESS NOTES
OP Anticoagulation Service Note    Date: 2023    Anticoagulation Summary  As of 2023      INR goal:  2.0-3.0   TTR:  53.6 % (2.5 y)   INR used for dosin.80 (2023)   Warfarin maintenance plan:  10 mg (5 mg x 2) every Mon, Wed, Fri; 11 mg (5 mg x 2 and 1 mg x 1) all other days   Weekly warfarin total:  74 mg   Plan last modified:  Issa Sahu, PharmD (2023)   Next INR check:  2023   Target end date:  Indefinite    Indications    Warfarin anticoagulation [Z79.01]  Atrial fibrillation (HCC) [I48.91]                 Anticoagulation Episode Summary       INR check location:      Preferred lab:      Send INR reminders to:      Comments:  Alere  call each time.          Anticoagulation Care Providers       Provider Role Specialty Phone number    Marlon Solorio M.D. Referring Internal Medicine [phone]          Anticoagulation Patient Findings        Patient's preferred phone number:  [phone]        HPI:   The reason for today's call is to prevent morbidity and mortality from a blood clot and/or stroke and to reduce the risk of bleeding while on a anticoagulant.     PCP:  No primary care provider on file.  No primary provider on file.    Assessment:     INR  therapeutic.     Lab Results   Component Value Date/Time    BUN 19 2021 11:38 AM    CREATININE 1.02 2021 11:38 AM     Lab Results   Component Value Date/Time    HEMOGLOBIN 16.3 2021 11:38 AM    HEMATOCRIT 48.8 2021 11:38 AM    PLATELETCT 131 (L) 2021 11:38 AM    ALKPHOSPHAT 85 2021 11:34 AM    ASTSGOT 45 2021 11:34 AM    ALTSGPT 39 2021 11:34 AM          Current Outpatient Medications:     warfarin, Take one to two tablets by mouth daily or as directed by anticoagulation clinic    warfarin, Take one tablet by mouth daily or as directed by anticoagulation clinic    spironolactone, TAKE 1 TABLET BY MOUTH TWICE A DAY    pravastatin, 80 mg, Oral, QHS    pioglitazone, 30  mg, Oral, DAILY    metoprolol tartrate, 25 mg, Oral, BID    metformin, TAKE 1 TABLET BY MOUTH TWICE A DAY WITH MEALS    lisinopril, 5 mg, Oral, QDAY    Jardiance, 1 Tablet, Oral, DAILY    Trulicity, 0.5 mL, Subcutaneous, Q7 DAYS    Lantus SoloStar, INJECT 22 UNITS SUBCUTANEOUSLY EVERY EVENING AS DIRECTED BY PHYSICIAN    fluticasone, 1 Spray, Nasal, BID    neomycin-polymyxin-HC, Administer 8 Drops into the right ear.    ofloxacin otic sol, Administer 5 Drops into the right ear.    docosahexanoic acid, 1,000 mg, Oral, BID      Plan:     Continue the same warfarin dose, as noted above.       Follow-up:     As seen above      Additional information discussed with patient:     Asked patient to please call the anticoagulation clinic if they have any signs/symptoms of bleeding and/or thrombosis or any changes to diet or medications.      National recommendations regarding anticoagulation therapy:     The CHEST guidelines recommends frequent INR monitoring at regular intervals (a few days up to a max of 12 weeks) to ensure patients are on the proper dose of warfarin, and patients are not having any complications from therapy.  INRs can dramatically change over a short time period due to diet, medications, and medical conditions.         [facility]  Phone: [phone]  Fax: [phone]  On call: [phone]  General scheduling/information [phone]  For emergencies please dial [phone]  Please do not use [employer] for urgent matters, call the phone numbers listed above.    This note was created using voice recognition software (Dragon). The accuracy of the dictation is limited by the abilities of the software. I have reviewed the note prior to signing, however some errors in grammar and context are still possible. If you have any questions related to this note please do not hesitate to contact our office.

## 2024-01-25 ENCOUNTER — DOCUMENTATION (OUTPATIENT)
Dept: VASCULAR LAB | Facility: MEDICAL CENTER | Age: 69
End: 2024-01-25

## 2024-01-25 NOTE — PROGRESS NOTES
Discharged from [facility] as pt has moved to [place]  Savanna Abreu, PharmD

## 2025-02-10 NOTE — TELEPHONE ENCOUNTER
----- Message from Marvin Barreto M.D. sent at 6/22/2020  7:40 AM PDT -----  Please notify patient her INR is high at 3.5.  Please ask if she has had any changes to his Coumadin dose or diet.  Please also confirm what her Coumadin dosing is.  Marvin Barreto M.D.       no

## (undated) DEVICE — MASK PANORAMIC OXYGEN PRO2 (30EA/CA)

## (undated) DEVICE — CONTAINER, SPECIMEN, STERILE

## (undated) DEVICE — TUBE CONNECTING SUCTION - CLEAR PLASTIC STERILE 72 IN (50EA/CA)

## (undated) DEVICE — SYRINGE DISP. 50CC LS - (40/BX)

## (undated) DEVICE — MANIFOLD NEPTUNE 1 PORT (20/PK)

## (undated) DEVICE — TOWEL STOP TIMEOUT SAFETY FLAG (40EA/CA)

## (undated) DEVICE — FILM CASSETTE ENDO

## (undated) DEVICE — WATER IRRIGATION STERILE 1000ML (12EA/CA)

## (undated) DEVICE — SENSOR SPO2 ADULT LNCS ADTX (20/BX) ORDER ITEM #19593

## (undated) DEVICE — SET LEADWIRE 5 LEAD BEDSIDE DISPOSABLE ECG (1SET OF 5/EA)

## (undated) DEVICE — BITE BLOCK ADULT 60FR (100EA/CA)

## (undated) DEVICE — SET CONTINU-FLO SOLN 3 - (48/CA)

## (undated) DEVICE — ELECTRODE 850 FOAM ADHESIVE - HYDROGEL RADIOTRNSPRNT (50/PK)

## (undated) DEVICE — TUBING O2 7FT TIP SMTH BORE - (50/CA)

## (undated) DEVICE — SET EXTENSION WITH 2 PORTS (48EA/CA) ***PART #2C8610 IS A SUBSTITUTE*****

## (undated) DEVICE — FORCEP RADIAL JAW 4 STANDARD CAPACITY W/NEEDLE 240CM (40EA/BX)

## (undated) DEVICE — CANISTER SUCTION RIGID RED 1500CC (40EA/CA)

## (undated) DEVICE — DEVICE HEMOSTATIC CLIPPING RESOLUTION 360 DEGREES (20EA/BX)

## (undated) DEVICE — KIT CUSTOM PROCEDURE SINGLE FOR ENDO  (15/CA)

## (undated) DEVICE — MASK WITH FACE SHIELD (25/BX 4BX/CA)

## (undated) DEVICE — CANNULA O2 COMFORT SOFT EAR ADULT 7 FT TUBING (50/CA)